# Patient Record
Sex: MALE | Race: WHITE
[De-identification: names, ages, dates, MRNs, and addresses within clinical notes are randomized per-mention and may not be internally consistent; named-entity substitution may affect disease eponyms.]

---

## 2019-03-30 ENCOUNTER — HOSPITAL ENCOUNTER (INPATIENT)
Dept: HOSPITAL 74 - FER | Age: 84
LOS: 17 days | Discharge: NURSING, REHAB OR LONG-TERM CARE | DRG: 252 | End: 2019-04-16
Attending: NURSE PRACTITIONER | Admitting: HOSPITALIST
Payer: COMMERCIAL

## 2019-03-30 VITALS — BODY MASS INDEX: 20.6 KG/M2

## 2019-03-30 DIAGNOSIS — R41.82: ICD-10-CM

## 2019-03-30 DIAGNOSIS — J96.01: ICD-10-CM

## 2019-03-30 DIAGNOSIS — J18.9: ICD-10-CM

## 2019-03-30 DIAGNOSIS — F03.90: ICD-10-CM

## 2019-03-30 DIAGNOSIS — I50.23: ICD-10-CM

## 2019-03-30 DIAGNOSIS — R60.0: ICD-10-CM

## 2019-03-30 DIAGNOSIS — I27.81: ICD-10-CM

## 2019-03-30 DIAGNOSIS — J96.02: ICD-10-CM

## 2019-03-30 DIAGNOSIS — J44.1: ICD-10-CM

## 2019-03-30 DIAGNOSIS — F32.9: ICD-10-CM

## 2019-03-30 DIAGNOSIS — I48.92: ICD-10-CM

## 2019-03-30 DIAGNOSIS — E87.2: ICD-10-CM

## 2019-03-30 DIAGNOSIS — N17.9: ICD-10-CM

## 2019-03-30 DIAGNOSIS — I27.20: ICD-10-CM

## 2019-03-30 DIAGNOSIS — J44.9: ICD-10-CM

## 2019-03-30 DIAGNOSIS — I48.2: ICD-10-CM

## 2019-03-30 DIAGNOSIS — Z86.718: ICD-10-CM

## 2019-03-30 DIAGNOSIS — N18.3: ICD-10-CM

## 2019-03-30 DIAGNOSIS — I13.0: ICD-10-CM

## 2019-03-30 DIAGNOSIS — I72.4: Primary | ICD-10-CM

## 2019-03-30 DIAGNOSIS — G47.33: ICD-10-CM

## 2019-03-30 DIAGNOSIS — E78.5: ICD-10-CM

## 2019-03-30 DIAGNOSIS — E03.9: ICD-10-CM

## 2019-03-30 DIAGNOSIS — Z99.81: ICD-10-CM

## 2019-03-30 LAB
ALBUMIN SERPL-MCNC: 3.2 G/DL (ref 3.4–5)
ALP SERPL-CCNC: 58 U/L (ref 45–117)
ALT SERPL-CCNC: 10 U/L (ref 13–61)
ANION GAP SERPL CALC-SCNC: 6 MMOL/L (ref 8–16)
ARTERIAL BLOOD GAS PCO2: 71.3 MMHG (ref 35–45)
AST SERPL-CCNC: 21 U/L (ref 15–37)
BASE EXCESS BLDA CALC-SCNC: 4.5 MEQ/L (ref -2–2)
BASOPHILS # BLD: 3.4 % (ref 0–2)
BILIRUB SERPL-MCNC: 1.3 MG/DL (ref 0.2–1)
BUN SERPL-MCNC: 30 MG/DL (ref 7–18)
CALCIUM SERPL-MCNC: 8 MG/DL (ref 8.5–10)
CHLORIDE SERPL-SCNC: 100 MMOL/L (ref 98–107)
CO2 SERPL-SCNC: 31 MMOL/L (ref 21–32)
COHGB MFR BLD: 1.9 % (ref 0–2)
CREAT SERPL-MCNC: 1.4 MG/DL (ref 0.55–1.3)
DEPRECATED RDW RBC AUTO: 16 % (ref 11.9–15.9)
EOSINOPHIL # BLD: 0.5 % (ref 0–4.5)
GLUCOSE SERPL-MCNC: 177 MG/DL (ref 74–106)
HCT VFR BLD CALC: 48.2 % (ref 35.4–49)
HGB BLD-MCNC: 15.2 GM/DL (ref 11.7–16.9)
INR BLD: 1.28 (ref 0.82–1.09)
LYMPHOCYTES # BLD: 8.3 % (ref 8–40)
MAGNESIUM SERPL-MCNC: 2 MG/DL (ref 1.8–2.4)
MCH RBC QN AUTO: 33.6 PG (ref 25.7–33.7)
MCHC RBC AUTO-ENTMCNC: 31.6 G/DL (ref 32–35.9)
MCV RBC: 106.3 FL (ref 80–96)
MONOCYTES # BLD AUTO: 7.9 % (ref 3.8–10.2)
NEUTROPHILS # BLD: 79.9 % (ref 42.8–82.8)
PLATELET # BLD AUTO: 119 K/MM3 (ref 134–434)
PMV BLD: 7.5 FL (ref 7.5–11.1)
PO2 BLDA: 35 MMHG (ref 80–105)
POTASSIUM SERPLBLD-SCNC: 5.5 MMOL/L (ref 3.5–5.1)
PROT SERPL-MCNC: 6.4 G/DL (ref 6.4–8.2)
PT PNL PPP: 14.3 SEC (ref 10.2–13)
RBC # BLD AUTO: 4.53 M/MM3 (ref 4–5.6)
SAO2 % BLDA: 57.7 % (ref 95–98)
SODIUM SERPL-SCNC: 137 MMOL/L (ref 136–145)
WBC # BLD AUTO: 6 K/MM3 (ref 4–10.8)

## 2019-03-30 RX ADMIN — ALBUTEROL SULFATE SCH AMP: 2.5 SOLUTION RESPIRATORY (INHALATION) at 13:59

## 2019-03-30 RX ADMIN — ALBUTEROL SULFATE SCH AMP: 2.5 SOLUTION RESPIRATORY (INHALATION) at 13:39

## 2019-03-30 NOTE — HP
Admitting History and Physical





- Primary Care Physician


PCP: Roosevelt Hernandez





- Admission


Chief Complaint: SOB, Leg Swelling


History of Present Illness: 





This is a 83 y/o man with a PMHx of: COPD 2LNC, Pulm HTN, HLD, Right Sided CHF, 

Atrial Flutter (on Eliquis) Hypothyroidism, Dementia, Depression, ANNA. Who 

presents to the ED with worsening SOB, productive cough- white phlegm x 1 week. 

Per ED records patient has been non-complaint with taking his Torsemide x 2 

days. Patient reports pain and swelling to his RLE. Patient reports quitting 

smoking 5 days ago. Patient denies fever, chills, dizziness, CP, palpitations, 

AP, N/V/D, constipation, dysuria 


History Source: Patient


Limitations to Obtaining History: Dementia, Poor Historian





- Past Medical History


CNS: Yes: Dementia


Cardiovascular: Yes: AFIB, Aneurysm (thoracic), HTN, Hyperlipdemia, Pulmonary 

Hypertension


Pulmonary: Yes: COPD, O2 Dependent, Sleep Apnea


Psych: Yes: Depression


Endocrine: Yes: Hypothyroidism





- Past Surgical History


Past Surgical History: Yes: Cataract Removal (bilateral)





- Smoking History


Smoking history: Current every day smoker


Have you smoked in the past 12 months: Yes


Aproximately how many cigarettes per day: 8 (hx 1 PPD x 50+ yrs)


If you are a former smoker, when did you quit?: 5 DAYS AGO





- Alcohol/Substance Use


Hx Alcohol Use: Yes


History of Substance Use: reports: None





- Social History


ADL: Independent


History of Recent Travel: No





Home Medications





- Allergies


Allergies/Adverse Reactions: 


 Allergies











Allergy/AdvReac Type Severity Reaction Status Date / Time


 


No Known Drug Allergies Allergy   Verified 03/30/19 12:19














- Home Medications


Home Medications: 


Ambulatory Orders





Ziprasidone [Geodon -] 80 mg PO HS 08/14/12 


Levalbuterol HCl [Xopenex] 1.25 mg IH Q4H PRN 12/02/15 


Potassium Chloride 20 meq PO DAILY  tablet 06/16/16 


Metoprolol Succinate 25 mg PO 1 and 1/2 DAILY 09/16/16 


Torsemide 20 mg PO DAILY  tablet 12/16/16 


Ascorbic Acid [Vitamin C] 500 mg PO DAILY  capsule 03/23/17 


Cholecalciferol (Vitamin D3) [Vitamin D3] 2,000 unit PO DAILY  capsule 03/23/17 


Carbamazepine 200 mg PO HS 03/30/19 











Family Disease History





- Family Disease History


Family History: Unable to Obtain





Review of Systems





- Review of Systems


Constitutional: reports: No Symptoms


Eyes: reports: No Symptoms


HENT: reports: No Symptoms


Neck: reports: No Symptoms


Cardiovascular: reports: Shortness of Breath


Respiratory: reports: Cough, SOB, SOB on Exertion


Gastrointestinal: reports: No Symptoms


Genitourinary: reports: No Symptoms


Breasts: reports: No Symptoms Reported


Musculoskeletal: reports: Extremity Pain, Joint Swelling


Integumentary: reports: No Symptoms


Neurological: reports: No Symptoms


Endocrine: reports: No Symptoms


Hematology/Lymphatic: reports: No Symptoms


Psychiatric: reports: No Symptoms





Physical Examination


Vital Signs: 


 Vital Signs











Temperature  98 F   03/30/19 18:41


 


Pulse Rate  62   03/30/19 19:23


 


Respiratory Rate  18   03/30/19 19:23


 


Blood Pressure  122/75   03/30/19 19:23


 


O2 Sat by Pulse Oximetry (%)  93 L  03/30/19 21:15











Constitutional: Yes: Mild Distress


Eyes: Yes: Conjunctiva Clear, EOM Intact, PERRL


HENT: Yes: WNL, Atraumatic, Normocephalic


Neck: Yes: WNL, Supple, Trachea Midline


Cardiovascular: Yes: Pulse Irregular, S1, S2


Respiratory: Yes: Diminished, On BiPap, Rhonchi, SOB, SOB on Exertion, Tachypnea

, Wheezes


Gastrointestinal: Yes: Normal Bowel Sounds, Soft


Edema: Yes


Edema: LLE: Trace, RLE: 2+


Peripheral Pulses WNL: Yes


Neurological: Yes: Alert, Confusion, Cran Nerves II-XII Intact


...Motor Strength: WNL


Psychiatric: Yes: Alert


Labs: 


 CBC, BMP





 03/30/19 13:20 





 03/30/19 13:20 











Imaging





- Results


Chest X-ray: Report Reviewed


Ultrasound: Report Reviewed, Image Reviewed


EKG: Image Reviewed





Problem List





- Problems


(1) Acute respiratory failure with hypoxia and hypercarbia


Code(s): J96.01 - ACUTE RESPIRATORY FAILURE WITH HYPOXIA; J96.02 - ACUTE 

RESPIRATORY FAILURE WITH HYPERCAPNIA   





(2) COPD with acute exacerbation


Code(s): J44.1 - CHRONIC OBSTRUCTIVE PULMONARY DISEASE W (ACUTE) EXACERBATION   





(3) CHF exacerbation


Code(s): I50.9 - HEART FAILURE, UNSPECIFIED   


Qualifiers: 


   Heart failure type: right-sided   Qualified Code(s): I50.813 - Acute on 

chronic right heart failure   





(4) Aneurysm of right popliteal artery


Code(s): I72.4 - ANEURYSM OF ARTERY OF LOWER EXTREMITY   





(5) Leg edema, right


Code(s): R60.0 - LOCALIZED EDEMA   





(6) Hyperlipidemia


Code(s): E78.5 - HYPERLIPIDEMIA, UNSPECIFIED   


Qualifiers: 


   Hyperlipidemia type: unspecified   Qualified Code(s): E78.5 - Hyperlipidemia

, unspecified   





(7) Hypertension


Code(s): I10 - ESSENTIAL (PRIMARY) HYPERTENSION   


Qualifiers: 


   Hypertension type: essential hypertension   Qualified Code(s): I10 - 

Essential (primary) hypertension   





(8) Pulmonary hypertension


Code(s): I27.2 - OTHER SECONDARY PULMONARY HYPERTENSION * DO NOT USE *   





(9) Obstructive sleep apnea


Code(s): G47.33 - OBSTRUCTIVE SLEEP APNEA (ADULT) (PEDIATRIC)   





(10) Hypothyroidism


Code(s): E03.9 - HYPOTHYROIDISM, UNSPECIFIED   


Qualifiers: 


   Hypothyroidism type: unspecified   Qualified Code(s): E03.9 - Hypothyroidism

, unspecified   





(11) Dementia


Code(s): F03.90 - UNSPECIFIED DEMENTIA WITHOUT BEHAVIORAL DISTURBANCE   


Qualifiers: 


   Dementia type: unspecified type   Dementia behavioral disturbance: without 

behavioral disturbance   Qualified Code(s): F03.90 - Unspecified dementia 

without behavioral disturbance   





(12) Depression


Code(s): F32.9 - MAJOR DEPRESSIVE DISORDER, SINGLE EPISODE, UNSPECIFIED   


Qualifiers: 


   Depression Type: unspecified   Qualified Code(s): F32.9 - Major depressive 

disorder, single episode, unspecified   





Assessment/Plan





This is a 83 y/o man with a PMHx of: COPD (2LNC), Pulm HTN, Dementia, HLD, R- 

sided CHF, Aflutter (on Eliquis), Hypothyroid, Depression, ANNA. Admitted to 

Telemetry for Acute Respiratory Failure with Hypoxia and Hypercapnia, Acute 

COPD Exacerbation, Pneumonia for further evaluation of their emergent condition.





Plan:





1. Pulm:


Acute Respiratory Failure with Hypoxia and Hypercapnia


Likely secondary to COPD flare vs CHF vs PNA


Continue cardiac monitoring


Chest Xray report- ?RLL infiltrate, increased bibasilar markings


[id number]


Ceftriaxone, and Doxycycline given in ED, will continue


ABG- 7.31/71/35/33/57


BIPAP settings: 40/16/12/5


Repeat ABG, titrate BIPAP accordingly


Appreciate Pulm Consult


Continue home meds


Monitor CBC, BMP





2. Cardiovascular:


HTN


CHF


Aflutter


R- Popliteal Artery Aneurysm


EKG- SR with PVCs, ST & T wave abnormality


Duplex LE- no DVT, +right popliteal artery aneurysm measuring 4.0cm x 2.3cm


Lasix, Solumederol, Magnesium Sulfate given in ED


Appreciate Cardiology consult


Appreciate Vascular consult


Neurovascular checks


Continue home meds


Continue Lasix, Solumederol with taper


Daily weights


Strict INOs





3. Endocrinology:


Hypothyroid


Continue Levothyroxine





4. Psych:


Dementia


Depression


Continue home meds





FEN


Fluid Restriction 1L


Replete lytes prn


Low Na Diet





DVT ppx


OOB


SCD- L- leg


Continue Eliquis





Dispo: Requires Inpatient Care





























Visit type





- Emergency Visit


Emergency Visit: Yes


ED Registration Date: 03/30/19


Care time: The patient presented to the Emergency Department on the above date 

and was hospitalized for further evaluation of their emergent condition.





- New Patient


This patient is new to me today: Yes


Date on this admission: 03/30/19





- Critical Care


Critical Care patient: No

## 2019-03-30 NOTE — PDOC
History of Present Illness





- General


Chief Complaint: Shortness of Breath


Stated Complaint: SOB


Time Seen by Provider: 03/30/19 12:35


History Source: Patient, Family


Exam Limitations: Dementia





- History of Present Illness


Initial Comments: 





03/30/19 14:09


Patient is a 84-year-old man with a history of COPD on 2L home O2, pulmonary HTN

, right sided CHF, HTN, hyperlipidemia, hypothyroidism, dementia, depression, 

atrial flutter, and ANNA, presenting with 1 week of progressively worsening 

productive white cough and shortness of breath with minimal exertion. He states 

he has not been taking his torsemide 20mg x 2 days because he forgot. since 

then, also RLE swelling and pain.


Notable for dementia, poor historian





Allergies: NKA


Past Medical History:  COPD on 2L home O2, pulmonary HTN, right sided CHF, HTN, 

hyperlipidemia, hypothyroidism, dementia, depression, atrial flutter, and ANNA


Social history: Lives with family. +current heavy smoker, No alcohol. No 

illicit drugs.


Surgical history: noncontributory


PMD: Dr Hernandez


Cards: Dr Shannon Peralta: Dr Mehta





Review of systems


Constitutional: no fevers or chills.


HEENT: no headache or dizziness. No congestion. No visual/hearing disturbances.


CVS:  no cp or syncope. No palpitations. +peripheral edema


Resp: +SOB, cough, wheezing.


Gastrointestinal: no abdominal pain, nausea or vomiting. 


Genitourinary: no urinary sx, hematuria.


MUSCULOSKELETAL: No joint pain and swelling. No neck or back pain. +leg 

swelling and pain.


SKIN: no redness or skin changes, no discharge, no rash. No wounds. +cyanosis


Hematologic: no easy bruising/bleeding.


NEUROLOGIC: No headache, dizziness, LOC or altered mental status. No weakness, 

numbness or tingling. 


Allergic/Immunologic: no allergies


All other systems reviewed and negative, or as documented in HPI. 





Physical exam:


General:   mild respiratory distress 2/2 tachypnea, increased WOB, speaking 

full short sentences.


HEENT:  NCAT, PERRL, EOMI, clear conjunctiva, anicteric, dry mucus membranes, 

clear oropharynx, no oral lesions.. 


Neck:  neck supple, FROM


Resp:  Moderate respiratory distress, tachypneic, on 2L supp O2 via NC, +

bilateral faint expiratory wheezing and diminished breath sounds/poor air 

movement with inspiration and expiration. +lip pursing, faint lip cyanosis on 

arrival


CVS: RRR, no murmurs, 2+ peripheral pulses throughout, +RLE peripheral edema, 1

+ PT pulses bilaterally


Abdomen: soft, NTND, no peritoneal signs. 


Back: nontender, normal inspection and ROM


MSK:  +RLE 4+ pitting edema in ankle and pretibial region. CAMP x4, ROM intact. 

No clubbing or cyanosis. normal bulk and tone.


Neuro: alert, no focal neuro deficits. wiggles toes, sensation grossly intact 

to light touch in all extremities including BLE


Skin: warm and well perfused, cap refill <2 sec








03/30/19 14:12





03/30/19 14:55





03/30/19 14:56





03/30/19 16:07





03/30/19 21:19








Past History





- Past Medical History


Allergies/Adverse Reactions: 


 Allergies











Allergy/AdvReac Type Severity Reaction Status Date / Time


 


No Known Drug Allergies Allergy   Verified 03/30/19 12:19











Home Medications: 


Ambulatory Orders





RX: Ziprasidone [Geodon -] 80 mg PO HS 08/14/12 


RX: Levalbuterol HCl [Xopenex] 1.25 mg IH Q4H PRN 12/02/15 


RX: Potassium Chloride 20 meq PO DAILY  tablet 06/16/16 


RX: Metoprolol Succinate 25 mg PO 1 and 1/2 DAILY 09/16/16 


RX: Torsemide 20 mg PO DAILY  tablet 12/16/16 


Ascorbic Acid [Vitamin C] 500 mg PO DAILY  capsule 03/23/17 


Cholecalciferol (Vitamin D3) [Vitamin D3] 2,000 unit PO DAILY  capsule 03/23/17 


RX: Carbamazepine 200 mg PO HS 03/30/19 








Anemia: No


Asthma: No


Cancer: No


Cardiac Disorders: Yes (THORACIC ANEURYSM BEING WATCHED)


CVA: No


COPD: Yes


CHF: No


Dementia: No


Diabetes: No


GI Disorders: No


 Disorders: Yes (BLADDER TUMOR)


HTN: Yes


Hypercholesterolemia: Yes


Liver Disease: No


Seizures: No


Thyroid Disease: Yes





- Surgical History


Abdominal Surgery: No


Appendectomy: No


Cardiac Surgery: No


Cholecystectomy: No


Lung Surgery: No


Neurologic Surgery: No


Orthopedic Surgery: Yes (REPAIR RIGHT ANKLE FX 2000)





- Suicide/Smoking/Psychosocial Hx


Smoking History: Current every day smoker


Have you smoked in the past 12 months: Yes


Number of Cigarettes Smoked Daily: 8


'Breaking Loose' booklet given: 02/29/16


Hx Alcohol Use: Yes


Drug/Substance Use Hx: No


Substance Use Type: Alcohol


Hx Substance Use Treatment: No





**Heart Score/ECG Review


  ** #1


ECG reviewed & interpreted by me at: 13:15


General ECG Interpretation: Sinus Rhythm, Normal Rate, Normal Intervals


Compared to previous ECG there are: No significant change





03/30/19 14:12





EKG normal sinus rhythm at 95 bpm, no interval abnormalities, narrow QRS, ST 

and T wave segments and morphology normal. Nonspecific T wave abnormalities 

similar to prior EKGs








ED Treatment Course





- LABORATORY


CBC & Chemistry Diagram: 


 03/30/19 13:20





 03/30/19 13:20





- RADIOLOGY


Radiology Studies Ordered: 














 Category Date Time Status


 


 CHEST X-RAY PORTABLE* [RAD] Stat Radiology  03/30/19 12:49 Ordered











Chest X-Ray Result: CHF





Medical Decision Making





- Critical Care Time


Total Critical Care Time (minutes): 60 (acute respiratory failure, COPD and CHF 

exacerbation)


Critical Care Statement: The care of this patient involved high complexity 

decision making to prevent further life threatening deterioration of the patient

's condition and/or to evaluate & treat vital organ system(s) failure or risk 

of failure.





- Medical Decision Making





03/30/19 14:09





See HPI for details





DDx SOB: ACS, PE, PTX, CHF, COPD exac, pulmonary edema, pleurisy, pneumonia, 

viral syndrome. effusion. anemia, electrolyte/metabolic derangements.





Vital signs reviewed, +borderline hypoxia with COPD at baseline, on 2L NC O2 

supplementally, 93% appropriate. mild tachypnea, normotensive. afebrile,


Prior notes reviewed, including admissions, discharges and consultations. 

laboratory results and imaging reviewed, basic labs and lytes wnl, notable for 

baseline Cr 1.4, mild K elevation to 5.5, w/o EKG abnormalities such as 

interval derangements, QRS widening, T wave tenting.


ABG_acute respiratory acidosis/hypercarbic and hypoxemic.


CXR_cardiomegaly, increased bibasilar interstitial markings, no effusion or 

focal infiltrate.


Cardiac panel_neg trop, reassuring, less likely cardiac


bnp_elevated, similar to prior results as a result of his known CHF.


EKG normal sinus rhythm at 95 bpm, no interval abnormalities, narrow QRS, ST 

and T wave segments and morphology normal. Nonspecific T wave abnormalities 

similar to prior EKGs





ED course


- duonebs x3, IV solumedrol, Mg given severe COPD exacerbation


- diuresis with lasix, missed several days of torsemide, so also likely CHF 

flare.


-remains hypoxic here, allowed for 88-92% given COPD history. did have to 

uptitrate his O2 to maintain sats >88%. wheezing improved, still working to 

breathe and purse lips on expiration; uptitrated his O2 to 4L O2 via nasal 

cannula, now sats on multiple reassessments >90% which is appropriate in 

setting of COPD. however continues to have WOB, ABG with acute respiratory 

hypercarbic/hypoxemic acidosis, put on bipap, arranging with respiratory to 

[place]. comfortable on bipap, maintain to blow off Co2, recheck blood gas after 

several hours. bipap initiated ~7pm





- abx for coverage, as he is high risk with his comorbities and respiratory 

complications. cef/doxy appropriate coverage for CAP.


- blood cultures pending.


- flu swab negative


- duplex neg for DVT, however, +rt popliteal artery aneurysm with thrombus. NVI

, with 1+ PT pulses bilaterally, no pain, only 4+ peripheral edema in RLE. not 

acute ischemic limb, likely chronic in nature 


- Vascular cs, spoke with Dr Taylor on call, discussed the case and will need 

medical optimization prior to operative management. 


- ICU cs, accepted for higher level of care given his acute hypoxic respiratory 

failure requiring closer monitoring. 


- cards cs with Dr Myrick, for his right CHF exacerbation, diuresed and close 

monitoring of I/Os, urinal at bedside.





admit ICU [facility] for acute respiratory failure 2/2 COPD/CHF exacerbation 

at time of admission order. 


incidentally found to have rt popliteal artery aneurysm, with thrombus.


s/o to hospitalist team as well, admitting under Dr Trevizo. had discussed care 

several times via phone regarding status and bed placement.





440pm - no ICU bed, changed to tele. on reexam, pt has improved oxygenation 

status. remains on supp O2 via NC at 4L, stable. updated to telemetry bed at 

[facility]








03/30/19 17:31





03/30/19 19:53





03/30/19 21:21





03/30/19 21:21








*DC/Admit/Observation/Transfer


Diagnosis at time of Disposition: 


 COPD with acute exacerbation, Leg edema, right, Aneurysm of right popliteal 

artery, Right ventricular systolic dysfunction, Acute respiratory failure with 

hypoxia and hypercarbia





CHF exacerbation


Qualifiers:


 Heart failure type: right-sided Qualified Code(s): I50.813 - Acute on chronic 

right heart failure








- Discharge Dispostion


Condition at time of disposition: Guarded


Decision to Admit order: Yes


Decision to Admit order Date/Time: 





03/30/19 15:02





Decision to Admit Order











 Category Date Time Status


 


 Decision to Admit to Hospital Routine Admission  03/30/19 15:01 Ordered

















- Referrals





- Patient Instructions





- Post Discharge Activity

## 2019-03-31 LAB
ANION GAP SERPL CALC-SCNC: 4 MMOL/L (ref 8–16)
ARTERIAL BLOOD GAS PCO2: 55.7 MMHG (ref 35–45)
ARTERIAL BLOOD GAS PCO2: 56.8 MMHG (ref 35–45)
ARTERIAL PATENCY WRIST A: POSITIVE
ARTERIAL PATENCY WRIST A: POSITIVE
BASE EXCESS BLDA CALC-SCNC: 5.4 MEQ/L (ref -2–2)
BASE EXCESS BLDA CALC-SCNC: 5.6 MEQ/L (ref -2–2)
BASOPHILS # BLD: 0.2 % (ref 0–2)
BUN SERPL-MCNC: 26 MG/DL (ref 7–18)
CALCIUM SERPL-MCNC: 7.8 MG/DL (ref 8.5–10.1)
CHLORIDE SERPL-SCNC: 102 MMOL/L (ref 98–107)
CHOLEST SERPL-MCNC: 112 MG/DL (ref 50–200)
CO2 SERPL-SCNC: 31 MMOL/L (ref 21–32)
CREAT SERPL-MCNC: 1.3 MG/DL (ref 0.55–1.3)
DEPRECATED RDW RBC AUTO: 17 % (ref 11.9–15.9)
EOSINOPHIL # BLD: 0.2 % (ref 0–4.5)
GLUCOSE SERPL-MCNC: 78 MG/DL (ref 74–106)
HCT VFR BLD CALC: 45.1 % (ref 35.4–49)
HDLC SERPL-MCNC: 53 MG/DL (ref 40–60)
HGB BLD-MCNC: 14.4 GM/DL (ref 11.7–16.9)
LYMPHOCYTES # BLD: 10.5 % (ref 8–40)
MCH RBC QN AUTO: 33.5 PG (ref 25.7–33.7)
MCHC RBC AUTO-ENTMCNC: 32 G/DL (ref 32–35.9)
MCV RBC: 104.8 FL (ref 80–96)
MONOCYTES # BLD AUTO: 7.8 % (ref 3.8–10.2)
NEUTROPHILS # BLD: 81.3 % (ref 42.8–82.8)
PLATELET # BLD AUTO: 94 K/MM3 (ref 134–434)
PMV BLD: 7.8 FL (ref 7.5–11.1)
PO2 BLDA: 65.9 MMHG (ref 80–105)
PO2 BLDA: 77.4 MMHG (ref 80–105)
POTASSIUM SERPLBLD-SCNC: 4.5 MMOL/L (ref 3.5–5.1)
RBC # BLD AUTO: 4.31 M/MM3 (ref 4–5.6)
SAO2 % BLDA: 92.4 % (ref 95–98)
SAO2 % BLDA: 94.9 % (ref 95–98)
SODIUM SERPL-SCNC: 136 MMOL/L (ref 136–145)
TRIGL SERPL-MCNC: 55 MG/DL (ref 0–150)
WBC # BLD AUTO: 4.1 K/MM3 (ref 4–10)

## 2019-03-31 RX ADMIN — OMEGA-3-ACID ETHYL ESTERS SCH GM: 1 CAPSULE, LIQUID FILLED ORAL at 21:09

## 2019-03-31 RX ADMIN — OMEGA-3-ACID ETHYL ESTERS SCH GM: 1 CAPSULE, LIQUID FILLED ORAL at 11:01

## 2019-03-31 RX ADMIN — DIGOXIN SCH MG: 125 TABLET ORAL at 11:00

## 2019-03-31 RX ADMIN — ATORVASTATIN CALCIUM SCH MG: 10 TABLET, FILM COATED ORAL at 21:08

## 2019-03-31 RX ADMIN — DOXYCYCLINE SCH MLS/HR: 100 INJECTION, POWDER, LYOPHILIZED, FOR SOLUTION INTRAVENOUS at 11:14

## 2019-03-31 RX ADMIN — APIXABAN SCH MG: 5 TABLET, FILM COATED ORAL at 10:59

## 2019-03-31 RX ADMIN — APIXABAN SCH MG: 5 TABLET, FILM COATED ORAL at 21:09

## 2019-03-31 RX ADMIN — FUROSEMIDE SCH MG: 10 INJECTION, SOLUTION INTRAVENOUS at 14:50

## 2019-03-31 RX ADMIN — LEVOTHYROXINE SODIUM SCH MCG: 50 TABLET ORAL at 06:04

## 2019-03-31 RX ADMIN — IPRATROPIUM BROMIDE AND ALBUTEROL SULFATE SCH AMP: .5; 3 SOLUTION RESPIRATORY (INHALATION) at 20:22

## 2019-03-31 RX ADMIN — DOXYCYCLINE SCH MLS/HR: 100 INJECTION, POWDER, LYOPHILIZED, FOR SOLUTION INTRAVENOUS at 21:13

## 2019-03-31 RX ADMIN — IPRATROPIUM BROMIDE AND ALBUTEROL SULFATE SCH: .5; 3 SOLUTION RESPIRATORY (INHALATION) at 17:36

## 2019-03-31 RX ADMIN — DONEPEZIL HYDROCHLORIDE SCH MG: 10 TABLET, FILM COATED ORAL at 21:09

## 2019-03-31 RX ADMIN — CEFTRIAXONE SCH MLS/HR: 1 INJECTION, POWDER, FOR SOLUTION INTRAMUSCULAR; INTRAVENOUS at 16:24

## 2019-03-31 RX ADMIN — OXYCODONE HYDROCHLORIDE AND ACETAMINOPHEN SCH MG: 500 TABLET ORAL at 11:03

## 2019-03-31 NOTE — PN
Progress Note (short form)





- Note


Progress Note: 





Vascular Surgery 





Vascular Study reviewed.


Right popliteal artery with 2.3cm aneurysm. 


Reccommend CTA of Aorta and bl lower extremities runoff. 


With a popliteal artery aneurysm there is a 20-30 percent chance that 


the pt might have a abdominal aortic aneurysm. 


PLease order CTA when pt is stable. 








Justus Taylor DO

## 2019-03-31 NOTE — CON.PULM
Consult


Consult Specialty:: PULM/CCM 


Referred by:: ROSALINA


Reason for Consultation:: SOB





- History of Present Illness


Chief Complaint: SOB


History of Present Illness: 


84 M, home O2 dependent COPD, Pulmonary HTN, HLD, Diastolic CHF, Atrial Flutter 

on Eliquis, Hypothyroidism, Dementia, Depression, and Moderate ANNA (RDI: 16.3) 

titrated to CPAP @ 12 cm H2O (not using). 





Admitted via the ER due to progressive SOB and cough with white phlegm x 1 week.





He was noted to be in acute on chronic hypercpaneic respiratory failure 

requiring NIPPV support. 





No travel history or sick contacts. 





No hemoptysis or night sweats. 





Apparently he has been noncompliant with his Torsemide. 





CT imaging 4/18: 2.7 x 1.6 cm mass like density in the lingula / extensive 

emphysema 





- History Source


History Provided By: Patient


Limitations to Obtaining History: Poor Historian





- Past Medical History


CNS: Yes: Dementia


Cardio/Vascular: Yes: AFIB, Aneurysm (thoracic), HTN, Hyperlipdemia, Pulmonary 

Hypertension


Pulmonary: Yes: Bronchitis, COPD, O2 Dependent, Pneumonia, Sleep Apnea.  No: 

Previously Intubated, Pulmonary Embolus, Pulmonary Fibrosis


Psych: Yes: Depression


Endocrine: Yes: Hypothyroidism





- Past Surgical History


Past Surgical History: Yes: Cataract Removal (bilateral)





- Alcohol/Substance Use


Hx Alcohol Use: Yes


History of Substance Use: reports: None





- Smoking History


Smoking history: Current every day smoker


Have you smoked in the past 12 months: Yes


Aproximately how many cigarettes per day: 8 (hx 1 PPD x 50+ yrs)


If you are a former smoker, when did you quit?: 5 DAYS AGO





- Social History


ADL: Independent


History of Recent Travel: No





Home Medications





- Allergies


Allergies/Adverse Reactions: 


 Allergies











Allergy/AdvReac Type Severity Reaction Status Date / Time


 


No Known Drug Allergies Allergy   Verified 03/30/19 12:19














- Home Medications


Home Medications: 


Ambulatory Orders





Ziprasidone [Geodon -] 80 mg PO HS 08/14/12 


Levalbuterol HCl [Xopenex] 1.25 mg IH Q4H PRN 12/02/15 


Potassium Chloride 20 meq PO DAILY  tablet 06/16/16 


Metoprolol Succinate 25 mg PO 1 and 1/2 DAILY 09/16/16 


Torsemide 20 mg PO DAILY  tablet 12/16/16 


Ascorbic Acid [Vitamin C] 500 mg PO DAILY  capsule 03/23/17 


Cholecalciferol (Vitamin D3) [Vitamin D3] 2,000 unit PO DAILY  capsule 03/23/17 


Carbamazepine 200 mg PO HS 03/30/19 











Review of Systems





- Review of Systems


Constitutional: reports: Malaise, Weakness.  denies: Chills, Diaphoresis, 

Lethargy, Night Sweats, Unintentional Wgt. Loss


Eyes: reports: No Symptoms


HENT: reports: No Symptoms


Neck: reports: No Symptoms


Cardiovascular: reports: Edema, Shortness of Breath.  denies: Chest Pain, 

Palpitations


Respiratory: reports: Cough, Snoring, SOB, SOB on Exertion, Wheezing.  denies: 

Hemoptysis


Gastrointestinal: reports: No Symptoms


Genitourinary: reports: No Symptoms


Breasts: reports: No Symptoms Reported


Musculoskeletal: reports: No Symptoms


Integumentary: reports: No Symptoms


Neurological: reports: No Symptoms


Endocrine: reports: No Symptoms


Hematology/Lymphatic: reports: No Symptoms


Psychiatric: reports: No Symptoms





Physical Exam


Vital Sings: 


 Vital Signs











Temperature  97.4 F L  03/31/19 06:00


 


Pulse Rate  69   03/31/19 11:00


 


Respiratory Rate  20   03/31/19 06:00


 


Blood Pressure  138/80   03/31/19 06:00


 


O2 Sat by Pulse Oximetry (%)  93 L  03/30/19 21:15











Constitutional: Yes: Mild Distress


Eyes: Yes: Conjunctiva Clear, EOM Intact


HENT: Yes: Atraumatic, Normocephalic


Neck: Yes: Supple, Trachea Midline


Cardiovascular: Yes: Pulse Irregular


Respiratory: Yes: Cough, Diminished, Rhonchi, SOB, SOB on Exertion, Tachypnea, 

Wheezes.  No: Accessory Muscle Use, Stridor


...Inspection: Yes: WNL


...Clubbing: No


Gastrointestinal: Yes: Normal Bowel Sounds, Soft


Renal/: Yes: WNL


Musculoskeletal: Yes: WNL


Extremities: Yes: WNL


Edema: Yes


Peripheral Pulses WNL: Yes


Integumentary: Yes: WNL


Neurological: Yes: WNL, Alert, Oriented


...Motor Strength: WNL


Psychiatric: Yes: WNL, Alert, Oriented


Labs: 


 CBC, BMP





 03/31/19 07:00 





 03/31/19 07:00 





 ABG Results











ABG pH  7.37  (7.35-7.45)   03/31/19  07:00    


 


ABG pCO2 at Pt Temp  56.8 mmHg (35-45)  H  03/31/19  07:00    


 


ABG pO2 at Pt Temp  65.9 mmHg ()  L  03/31/19  07:00    


 


ABG HCO3  32.2 mmol/L (22-27)  H  03/31/19  07:00    


 


ABG O2 Sat (Measured)  92.4 % (95-98)  L  03/31/19  07:00    


 


ABG O2 Content  18.3 % vol (15-22)   03/31/19  07:00    


 


ABG Base Excess  5.6 meq/l (-2-2)  H  03/31/19  07:00    














Imaging





- Results


Chest X-ray: Report Reviewed, Image Reviewed





Problem List





- Problems


(1) Community acquired pneumonia


Code(s): J18.9 - PNEUMONIA, UNSPECIFIED ORGANISM   





(2) Acute respiratory acidosis


Code(s): E87.2 - ACIDOSIS   





(3) Acute respiratory failure with hypoxia and hypercarbia


Code(s): J96.01 - ACUTE RESPIRATORY FAILURE WITH HYPOXIA; J96.02 - ACUTE 

RESPIRATORY FAILURE WITH HYPERCAPNIA   





(4) Aneurysm of right popliteal artery


Code(s): I72.4 - ANEURYSM OF ARTERY OF LOWER EXTREMITY   





(5) COPD with acute exacerbation


Code(s): J44.1 - CHRONIC OBSTRUCTIVE PULMONARY DISEASE W (ACUTE) EXACERBATION   





(6) Chronic respiratory acidosis


Code(s): E87.2 - ACIDOSIS   





(7) Leg edema, right


Code(s): R60.0 - LOCALIZED EDEMA   





(8) Right ventricular systolic dysfunction


Code(s): I51.9 - HEART DISEASE, UNSPECIFIED   





(9) Atrial flutter


Code(s): I48.92 - UNSPECIFIED ATRIAL FLUTTER   


Qualifiers: 


   Atrial flutter type: unspecified   Qualified Code(s): I48.92 - Unspecified 

atrial flutter   





(10) COPD (chronic obstructive pulmonary disease)


Code(s): J44.9 - CHRONIC OBSTRUCTIVE PULMONARY DISEASE, UNSPECIFIED   


Qualifiers: 


   COPD type: COPD with acute exacerbation   Qualified Code(s): J44.1 - Chronic 

obstructive pulmonary disease with (acute) exacerbation   





(11) Chronic cor pulmonale


Code(s): I27.81 - COR PULMONALE (CHRONIC)   





(12) Dementia


Code(s): F03.90 - UNSPECIFIED DEMENTIA WITHOUT BEHAVIORAL DISTURBANCE   


Qualifiers: 


   Dementia type: unspecified type   Dementia behavioral disturbance: without 

behavioral disturbance   Qualified Code(s): F03.90 - Unspecified dementia 

without behavioral disturbance   





(13) Hyperlipidemia


Code(s): E78.5 - HYPERLIPIDEMIA, UNSPECIFIED   


Qualifiers: 


   Hyperlipidemia type: unspecified   Qualified Code(s): E78.5 - Hyperlipidemia

, unspecified   





(14) Hypertension


Code(s): I10 - ESSENTIAL (PRIMARY) HYPERTENSION   


Qualifiers: 


   Hypertension type: essential hypertension   Qualified Code(s): I10 - 

Essential (primary) hypertension   





(15) Hypothyroidism


Code(s): E03.9 - HYPOTHYROIDISM, UNSPECIFIED   


Qualifiers: 


   Hypothyroidism type: unspecified   Qualified Code(s): E03.9 - Hypothyroidism

, unspecified   





(16) Obstructive sleep apnea


Code(s): G47.33 - OBSTRUCTIVE SLEEP APNEA (ADULT) (PEDIATRIC)   





(17) Pulmonary hypertension


Code(s): I27.2 - OTHER SECONDARY PULMONARY HYPERTENSION * DO NOT USE *   





Assessment/Plan


Agree with ABX coverage 


Check urine antigen 


Check sputum 


NC O2 as tolerated 


NIPPV QHS and PRN 


Repeat CT chest for follow up of mass 


No smoking discussed


AC 


BD TX standing and PRN 


Continuous oxymetry monitoring


Will follow





Thank you. 





Dr Flor

## 2019-03-31 NOTE — CON.CARD
Cardiology Consult (text)





- Consultation


Consultation Note: 








Consultation Note: 


CC: SOB, LE edema





HPI: 83 yo M with h/o severe pHTN likely 2/2 untx ANNA and COPD on home O2, CAD 

based on coronary calcifications on chest CT, stable aortic dilation of the 

ascending and abdominal aorta, HTN/HL, remote history of DVT, afib on eliquis p/

w SOB, cough.  Cough for 1 week, also dyspnea. Has been noncompliant with 

torsemide for the last two days, pain and swelling of RLE noted.  Hx smoking, 

says he quit 5 days ago.  On nebs, steroids here, received IV lasix.  Has had 

some abd distension which he had in the past with volume overload.  Sees Dr. Ortega for cardio.











 Allergies











Allergy/AdvReac Type Severity Reaction Status Date / Time


 


No Known Drug Allergies Allergy   Verified 03/30/19 12:19











PMHx: Per HPI. Hypothyroid. 








PSH: Cataract





Social: Current smoker, rare Alcohol use. 





Family: No family history of heart disease. 





ROS: Per HPI


Ambulatory Orders





Ziprasidone [Geodon -] 80 mg PO HS 08/14/12 


Levalbuterol HCl [Xopenex] 1.25 mg IH Q4H PRN 12/02/15 


Potassium Chloride 20 meq PO DAILY  tablet 06/16/16 


Metoprolol Succinate 25 mg PO 1 and 1/2 DAILY 09/16/16 


Torsemide 20 mg PO DAILY  tablet 12/16/16 


Ascorbic Acid [Vitamin C] 500 mg PO DAILY  capsule 03/23/17 


Cholecalciferol (Vitamin D3) [Vitamin D3] 2,000 unit PO DAILY  capsule 03/23/17 


Carbamazepine 200 mg PO HS 03/30/19 








PE: 


  Vital Signs











 Period  Temp  Pulse  Resp  BP Sys/Gonsales  Pulse Ox


 


 Last 24 Hr  97.2 F-98 F  53-93  18-22  108-155/63-87  86-96











NAD, JVD elevated to mandible, -hepatojugular reflux


Irregular rate and rhythm. Nl S1, S2. 2/6 murmur at LSB


Good respiratory effort.  Prolonged exp phase with exp wheezes bilaterally


aaox 3


RLE 3+ pitting edema to knee


+BS, soft, NT, distended. 


Pos dp/PT


No jaundice, diaphoresis. 





 Laboratory Last Values











WBC  4.1 K/mm3 (4.0-10.0)   03/31/19  07:00    


 


RBC  4.31 M/mm3 (4.00-5.60)   03/31/19  07:00    


 


Hgb  14.4 GM/dL (11.7-16.9)   03/31/19  07:00    


 


Hct  45.1 % (35.4-49)   03/31/19  07:00    


 


MCV  104.8 fl (80-96)  H  03/31/19  07:00    


 


MCH  33.5 pg (25.7-33.7)   03/31/19  07:00    


 


MCHC  32.0 g/dl (32.0-35.9)   03/31/19  07:00    


 


RDW  17.0 % (11.9-15.9)  H  03/31/19  07:00    


 


Plt Count  94 K/MM3 (134-434)  L D 03/31/19  07:00    


 


MPV  7.8 fl (7.5-11.1)  D 03/31/19  07:00    


 


Absolute Neuts (auto)  3.3 K/mm3 (1.5-8.0)   03/31/19  07:00    


 


Neutrophils %  81.3 % (42.8-82.8)   03/31/19  07:00    


 


Lymphocytes %  10.5 % (8-40)  D 03/31/19  07:00    


 


Monocytes %  7.8 % (3.8-10.2)  D 03/31/19  07:00    


 


Eosinophils %  0.2 % (0-4.5)  D 03/31/19  07:00    


 


Basophils %  0.2 % (0-2.0)   03/31/19  07:00    


 


Nucleated RBC %  0 % (0-0)   03/31/19  07:00    


 


PT with INR  14.3 SEC (10.2-13.0)  H  03/30/19  13:20    


 


INR  1.28  (0.82-1.09)  H D 03/30/19  13:20    


 


Anticoagulation Therapy  No Result Required.   03/31/19  07:00    


 


Puncture Site  Right brachial   03/31/19  07:00    


 


ABG pH  7.37  (7.35-7.45)   03/31/19  07:00    


 


ABG pCO2 at Pt Temp  56.8 mmHg (35-45)  H  03/31/19  07:00    


 


ABG pO2 at Pt Temp  65.9 mmHg ()  L  03/31/19  07:00    


 


ABG HCO3  32.2 mmol/L (22-27)  H  03/31/19  07:00    


 


ABG O2 Sat (Measured)  92.4 % (95-98)  L  03/31/19  07:00    


 


ABG O2 Content  18.3 % vol (15-22)   03/31/19  07:00    


 


ABG Base Excess  5.6 meq/l (-2-2)  H  03/31/19  07:00    


 


Hector Test  Positive   03/31/19  07:00    


 


Carboxyhemoglobin  1.9 % (0-2)   03/30/19  14:48    


 


Methemoglobin  0.2 % (0-2)   03/30/19  14:48    


 


O2 Delivery Device  Nasal   03/31/19  07:00    


 


Oxygen Flow Rate  3l   03/31/19  07:00    


 


Vent Mode  No Result Required.   03/31/19  07:00    


 


Vent Rate  No Result Required.   03/31/19  07:00    


 


Mechanical Rate  No Result Required.   03/31/19  07:00    


 


PEEP  0.0 cmH2O  03/31/19  00:10    


 


Pressure Support Vent  No Result Required.   03/31/19  07:00    


 


Sodium  136 mmol/L (136-145)   03/31/19  07:00    


 


Potassium  4.5 mmol/L (3.5-5.1)   03/31/19  07:00    


 


Chloride  102 mmol/L ()   03/31/19  07:00    


 


Carbon Dioxide  31 mmol/L (21-32)   03/31/19  07:00    


 


Anion Gap  4 MMOL/L (8-16)  L  03/31/19  07:00    


 


BUN  26 mg/dL (7-18)  H  03/31/19  07:00    


 


Creatinine  1.3 mg/dL (0.55-1.3)   03/31/19  07:00    


 


Creat Clearance w eGFR  52.72  (>60)   03/31/19  07:00    


 


Random Glucose  78 mg/dL ()   03/31/19  07:00    


 


Hemoglobin A1c %  5.8 % (4.2-6.3)   03/31/19  07:00    


 


Calcium  7.8 mg/dL (8.5-10.1)  L  03/31/19  07:00    


 


Magnesium  2.0 mg/dL (1.8-2.4)   03/30/19  13:20    


 


Total Bilirubin  1.3 mg/dl (0.2-1)  H  03/30/19  13:20    


 


AST  21 U/L (15-37)   03/30/19  13:20    


 


ALT  10 U/L (13-61)  L  03/30/19  13:20    


 


Alkaline Phosphatase  58 U/L ()   03/30/19  13:20    


 


Troponin I  0.03 ng/ml (0.00-0.05)   03/31/19  07:00    


 


B-Natriuretic Peptide  95220.0 pg/ml (5-450)  H  03/30/19  13:20    


 


Total Protein  6.4 g/dl (6.4-8.2)   03/30/19  13:20    


 


Albumin  3.2 g/dl (3.4-5.0)  L  03/30/19  13:20    


 


Triglycerides  55 mg/dL (0-150)   03/31/19  07:00    


 


Cholesterol  112 mg/dL ()   03/31/19  07:00    


 


Total LDL Cholesterol  57 mg/dL (5-100)   03/31/19  07:00    


 


HDL Cholesterol  53 mg/dL (40-60)   03/31/19  07:00    


 


Urine Color  Arin   03/30/19  14:18    


 


Urine Appearance  Clear   03/30/19  14:18    


 


Urine pH  6.0  (4.5-8)   03/30/19  14:18    


 


Urine Protein  2+  (NEGATIVE)  H  03/30/19  14:18    


 


Urine Glucose (UA)  Negative  (NEGATIVE)   03/30/19  14:18    


 


Urine Ketones  Negative  (NEGATIVE)   03/30/19  14:18    


 


Urine Blood  Negative  (NEGATIVE)   03/30/19  14:18    


 


Urine Nitrite  Negative  (NEGATIVE)   03/30/19  14:18    


 


Urine Bilirubin  Negative  (NEGATIVE)   03/30/19  14:18    


 


Urine Urobilinogen  2.0  (0.2-1.0)   03/30/19  14:18    


 


Ur Leukocyte Esterase  Negative  (NEGATIVE)   03/30/19  14:18    


 


Urine WBC  0-2  (NEGATIVE)   03/30/19  14:18    


 


Digoxin  0.43 ng/ml (0.8-2.0)  L  03/31/19  07:00    


 


Influenza A (Rapid)  Negative   03/30/19  15:00    


 


Influenza B (Rapid)  Negative   03/30/19  15:00    











Echo 2/18/16: Nl LV. RV mild-mod dilation with mild-mod systolic dysfunction.  

Severe pHTN (RVSP 62), mild TR. Mild Ao dilation.  





Pharm nuc stress 2/18/16: Afib with RVR. Asx. No ischemic changes. Nl perf. Nl 

EF. 





CXR: no congestion





CT chest: extensive COPD changes





EKG: sinus, PACs





RLE ultrasound, no DVT, R popliteal artery aneurysm





tele: sinus











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 

on coronary calcifications on chest CT, stable aortic dilation of the ascending 

and abdominal aorta, HTN/HL, remote history of DVT, afib on eliquis p/w SOB, 

cough.





SOB, cough, COPD


- CT with significant findings for COPD, receiving nebs and steroids per primary


- also received IV lasix for CHF, missed torsemide doses





hx R heart failure, pulm HTN


- BNP >11,000 


- echo pending


- received lasix 40 mg IV x 1, now on 80 mg IV BID - has abd distension, which 

he has had in the past with volume overload


- cont IV lasix, monitor Cr,lytes, daily standing weights





Atrial fibrillation. 


- cont eliquis, digoxin





popliteal artery aneurysm


- vascular consulted, Dr. Taylor


 


HL: 


- cont statin

## 2019-03-31 NOTE — PN
Progress Note, Physician





- Current Medication List


Current Medications: 


Active Medications





Albuterol Sulfate (Ventolin 0.083% Nebulizer Soln -)  1 amp NEB Q6H PRN


   PRN Reason: SHORT OF BREATH/WHEEZING


Apixaban (Eliquis -)  5 mg PO BID Replaced by [facility]


Ascorbic Acid (Vitamin C -)  500 mg PO DAILY Replaced by [facility]


Atorvastatin Calcium (Lipitor -)  10 mg PO HS [facility]


Carbamazepine (Tegretol -)  200 mg PO HS [facility]


Digoxin (Lanoxin -)  0.125 mg PO DAILY [facility]


Donepezil HCl (Aricept -)  10 mg PO HS G[facility]


Furosemide (Lasix Injection -)  40 mg IVPUSH DAILY Replaced by [facility]


Doxycycline Hyclate 100 mg/ (Dextrose)  100 mls @ 50 mls/hr IVPB BID GARY


Ceftriaxone Sodium 0.5 gm/ (Dextrose)  50 mls @ 100 mls/hr IVPB DAILY Replaced by [facility]; 

Protocol


Levothyroxine Sodium (Synthroid -)  50 mcg PO DAILY@0700 Replaced by [facility]


   Last Admin: 03/31/19 06:04 Dose:  50 mcg


Methylprednisolone Sodium Succinate (Solu-Medrol -)  40 mg IVPUSH Q8H-IV GARY


Non-Formulary Medication (Memantine Hcl [Namenda Xr])  14 mg PO AM GARY


Omega-3-Acid Ethyl Esters (Lovaza -)  1 gm PO BID Replaced by [facility]











- Objective


Vital Signs: 


 Vital Signs











Temperature  97.4 F L  03/31/19 06:00


 


Pulse Rate  53 L  03/31/19 06:00


 


Respiratory Rate  20   03/31/19 06:00


 


Blood Pressure  138/80   03/31/19 06:00


 


O2 Sat by Pulse Oximetry (%)  93 L  03/30/19 21:15











Constitutional: Yes: Well Nourished, No Distress, Calm


Eyes: Yes: WNL, Conjunctiva Clear, EOM Intact


HENT: Yes: WNL, Atraumatic, Normocephalic


Neck: Yes: WNL, Supple, Trachea Midline


Cardiovascular: Yes: WNL, Pulse Irregular, S1, S2


Respiratory: Yes: WNL, Regular, On BiPap, Rales, Rhonchi, SOB, SOB on Exertion


Gastrointestinal: Yes: WNL, Normal Bowel Sounds, Soft


Musculoskeletal: Yes: WNL


Extremities: Yes: WNL


Edema: Yes


Edema: LLE: 3+, RLE: 2+


Peripheral Pulses WNL: Yes


Integumentary: Yes: WNL


Neurological: Yes: WNL, Alert, Oriented


...Motor Strength: WNL


Psychiatric: Yes: WNL, Alert, Oriented


Labs: 


 CBC, BMP





 03/31/19 07:00 





 03/31/19 07:00 





 INR, PTT











INR  1.28  (0.82-1.09)  H D 03/30/19  13:20    














Problem List





- Problems


(1) Acute respiratory failure with hypoxia and hypercarbia


Assessment/Plan: 


2/2 tanmay COPD exacerbation 





patient is responding to BiPAP his CO2 level has decreased 


c/w BiPAP


trend the ABG 


pulmonary evaluation 





Code(s): J96.01 - ACUTE RESPIRATORY FAILURE WITH HYPOXIA; J96.02 - ACUTE 

RESPIRATORY FAILURE WITH HYPERCAPNIA   





(2) Aneurysm of right popliteal artery


Assessment/Plan: 


seen on US of the lower ext 


vascular surgery is following up on the patient 


Code(s): I72.4 - ANEURYSM OF ARTERY OF LOWER EXTREMITY   





(3) CHF exacerbation


Assessment/Plan: 


HFrEF 


obtain echocardiogram 


cardiology consultation 


admit to tele monitor 


IV 80mg furosemide twice a day 


then switch to 40mg IV x twice day from tmorrow 





Code(s): I50.9 - HEART FAILURE, UNSPECIFIED   


Qualifiers: 


   Heart failure type: right-sided   Qualified Code(s): I50.813 - Acute on 

chronic right heart failure   





(4) COPD with acute exacerbation


Assessment/Plan: 


c/w nebulizing treatment 


PO prednisone 40mg daily 


pulmonary evaluation 





Code(s): J44.1 - CHRONIC OBSTRUCTIVE PULMONARY DISEASE W (ACUTE) EXACERBATION   





(5) Atrial flutter


Assessment/Plan: 


possible 2/2 to the pulmonary HTN and COPD 


stable c/w digoxin 


c/w tele monitor 


Cardiology evaluation 


c/w apixiban 5mg twice a day - based on weight and kidney function 





Code(s): I48.92 - UNSPECIFIED ATRIAL FLUTTER   


Qualifiers: 


   Atrial flutter type: unspecified   Qualified Code(s): I48.92 - Unspecified 

atrial flutter   





(6) Chronic cor pulmonale


Assessment/Plan: 


stable 


c/w nebs


pulmonary consultation 





Code(s): I27.81 - COR PULMONALE (CHRONIC)   





(7) Hyperlipidemia


Assessment/Plan: 


c/w atrovastatin 


Code(s): E78.5 - HYPERLIPIDEMIA, UNSPECIFIED   


Qualifiers: 


   Hyperlipidemia type: unspecified   Qualified Code(s): E78.5 - Hyperlipidemia

, unspecified   





(8) Hypertension


Assessment/Plan: 


stable 


c/w home medication 





Code(s): I10 - ESSENTIAL (PRIMARY) HYPERTENSION   


Qualifiers: 


   Hypertension type: essential hypertension   Qualified Code(s): I10 - 

Essential (primary) hypertension   





(9) Hypothyroidism


Assessment/Plan: 


stable c/w levothyroxine 


Code(s): E03.9 - HYPOTHYROIDISM, UNSPECIFIED   


Qualifiers: 


   Hypothyroidism type: unspecified   Qualified Code(s): E03.9 - Hypothyroidism

, unspecified   





(10) Acute respiratory acidosis


Assessment/Plan: 


acute on chronic CO2 retention 2/2 to chronic COPD exacacerbation 


respondinf to BiPAP 





Code(s): E87.2 - ACIDOSIS   





(11) Chronic respiratory acidosis


Assessment/Plan: 


2/2 to chronic COPD and CO2 retention 


Code(s): E87.2 - ACIDOSIS

## 2019-04-01 LAB
ARTERIAL BLOOD GAS PCO2: 60.1 MMHG (ref 35–45)
ARTERIAL PATENCY WRIST A: POSITIVE
BASE EXCESS BLDA CALC-SCNC: 10.5 MEQ/L (ref -2–2)
PO2 BLDA: 57.5 MMHG (ref 80–105)
SAO2 % BLDA: 89 % (ref 95–98)

## 2019-04-01 RX ADMIN — DIGOXIN SCH MG: 125 TABLET ORAL at 11:17

## 2019-04-01 RX ADMIN — DOXYCYCLINE SCH MLS/HR: 100 INJECTION, POWDER, LYOPHILIZED, FOR SOLUTION INTRAVENOUS at 11:21

## 2019-04-01 RX ADMIN — LEVOTHYROXINE SODIUM SCH MCG: 50 TABLET ORAL at 06:11

## 2019-04-01 RX ADMIN — IPRATROPIUM BROMIDE AND ALBUTEROL SULFATE SCH AMP: .5; 3 SOLUTION RESPIRATORY (INHALATION) at 15:50

## 2019-04-01 RX ADMIN — OMEGA-3-ACID ETHYL ESTERS SCH GM: 1 CAPSULE, LIQUID FILLED ORAL at 11:17

## 2019-04-01 RX ADMIN — IPRATROPIUM BROMIDE AND ALBUTEROL SULFATE SCH AMP: .5; 3 SOLUTION RESPIRATORY (INHALATION) at 11:19

## 2019-04-01 RX ADMIN — DOXYCYCLINE SCH MLS/HR: 100 INJECTION, POWDER, LYOPHILIZED, FOR SOLUTION INTRAVENOUS at 22:39

## 2019-04-01 RX ADMIN — PREDNISONE SCH MG: 20 TABLET ORAL at 11:17

## 2019-04-01 RX ADMIN — CEFTRIAXONE SCH MLS/HR: 1 INJECTION, POWDER, FOR SOLUTION INTRAMUSCULAR; INTRAVENOUS at 11:20

## 2019-04-01 RX ADMIN — ATORVASTATIN CALCIUM SCH MG: 10 TABLET, FILM COATED ORAL at 21:41

## 2019-04-01 RX ADMIN — APIXABAN SCH MG: 5 TABLET, FILM COATED ORAL at 11:17

## 2019-04-01 RX ADMIN — DONEPEZIL HYDROCHLORIDE SCH MG: 10 TABLET, FILM COATED ORAL at 21:42

## 2019-04-01 RX ADMIN — OXYCODONE HYDROCHLORIDE AND ACETAMINOPHEN SCH MG: 500 TABLET ORAL at 11:17

## 2019-04-01 RX ADMIN — APIXABAN SCH MG: 5 TABLET, FILM COATED ORAL at 21:41

## 2019-04-01 RX ADMIN — FUROSEMIDE SCH MG: 10 INJECTION, SOLUTION INTRAVENOUS at 06:11

## 2019-04-01 RX ADMIN — IPRATROPIUM BROMIDE AND ALBUTEROL SULFATE SCH AMP: .5; 3 SOLUTION RESPIRATORY (INHALATION) at 07:36

## 2019-04-01 RX ADMIN — OMEGA-3-ACID ETHYL ESTERS SCH GM: 1 CAPSULE, LIQUID FILLED ORAL at 21:41

## 2019-04-01 RX ADMIN — IPRATROPIUM BROMIDE AND ALBUTEROL SULFATE SCH AMP: .5; 3 SOLUTION RESPIRATORY (INHALATION) at 20:10

## 2019-04-01 RX ADMIN — FUROSEMIDE SCH MG: 10 INJECTION, SOLUTION INTRAVENOUS at 15:19

## 2019-04-01 NOTE — PN
Progress Note (short form)





- Note


Progress Note: 


s: no chest pain, palps. stable sob, edema of RLE





 Current Medications





Albuterol Sulfate (Ventolin 0.083% Nebulizer Soln -)  1 amp NEB Q6H PRN


   PRN Reason: SHORT OF BREATH/WHEEZING


Albuterol/Ipratropium (Duoneb -)  1 amp NEB RQID [facility]


   Last Admin: 04/01/19 11:19 Dose:  1 amp


Apixaban (Eliquis -)  5 mg PO BID [facility]


   Last Admin: 04/01/19 11:17 Dose:  5 mg


Ascorbic Acid (Vitamin C -)  500 mg PO DAILY [facility]


   Last Admin: 04/01/19 11:17 Dose:  500 mg


Atorvastatin Calcium (Lipitor -)  10 mg PO HS [facility]


   Last Admin: 03/31/19 21:08 Dose:  10 mg


Carbamazepine (Tegretol -)  200 mg PO HS [facility]


   Last Admin: 03/31/19 21:08 Dose:  200 mg


Digoxin (Lanoxin -)  0.125 mg PO DAILY [facility]


   Last Admin: 04/01/19 11:17 Dose:  0.125 mg


Donepezil HCl (Aricept -)  10 mg PO HS [facility]


   Last Admin: 03/31/19 21:09 Dose:  10 mg


Furosemide (Lasix Injection -)  80 mg IVPUSH BIDLASIX [facility]


   Last Admin: 04/01/19 15:19 Dose:  80 mg


Doxycycline Hyclate 100 mg/ (Dextrose)  100 mls @ 50 mls/hr IVPB BID [facility]


   Last Admin: 04/01/19 11:21 Dose:  50 mls/hr


Ceftriaxone Sodium 0.5 gm/ (Dextrose)  50 mls @ 100 mls/hr IVPB DAILY [facility]; 

Protocol


   Last Admin: 04/01/19 11:20 Dose:  100 mls/hr


Levothyroxine Sodium (Synthroid -)  50 mcg PO DAILY@0700 [facility]


   Last Admin: 04/01/19 06:11 Dose:  50 mcg


Non-Formulary Medication (Memantine Hcl [Namenda Xr])  14 mg PO AM [facility]


Omega-3-Acid Ethyl Esters (Lovaza -)  1 gm PO BID [facility]


   Last Admin: 04/01/19 11:17 Dose:  1 gm


Prednisone (Deltasone -)  40 mg PO DAILY [facility]


   Last Admin: 04/01/19 11:17 Dose:  40 mg











PE: 


 Vital Signs











 Period  Temp  Pulse  Resp  BP Sys/Gonsales  Pulse Ox


 


 Last 24 Hr  97 F-98 F  53-87  20-23  120-149/73-86  90-97














NAD, +JVD


Irregular rate and rhythm. Nl S1, S2. 2/6 murmur at LSB


exp wheezes scattered


aaox 3


RLE 3+ pitting edema to knee


+BS, soft, NT, distended. 


Pos dp/PT


No jaundice, diaphoresis. 





 


Echo 2/18/16: Nl LV. RV mild-mod dilation with mild-mod systolic dysfunction.  

Severe pHTN (RVSP 62), mild TR. Mild Ao dilation.  





Pharm nuc stress 2/18/16: Afib with RVR. Asx. No ischemic changes. Nl perf. Nl 

EF. 





CXR: no congestion





CT chest: extensive COPD changes





EKG: sinus, PACs, afib





RLE ultrasound, no DVT, R popliteal artery aneurysm





echo 4/2019 low nl LV function, mildly reduced RV function, mildly dilated RA, 

mild MAC, mod TR, PASP at least 70 mmHg, mild AR, borderline aortic root 

dilation





tele: sinus





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 

on coronary calcifications on chest CT, stable aortic dilation of the ascending 

and abdominal aorta, HTN/HL, remote history of DVT, afib on eliquis p/w SOB, 

cough.





SOB, cough, COPD


- CT with significant findings for COPD, receiving nebs and steroids per primary


- also received IV lasix for CHF, missed torsemide doses





hx R heart failure, pulm HTN


- BNP >11,000 


- echo shows severe pulm HTN, low normal RV function


- received lasix 40 mg IV x 1, now on 80 mg IV BID - has abd distension, which 

he has had in the past with volume overload


- weight downtrending


- cont IV lasix, monitor Cr, lytes, daily standing weights





Atrial fibrillation. 


- cont eliquis, digoxin





popliteal artery aneurysm


- vascular consulted, Dr. Taylor


 


HL: 


- cont statin

## 2019-04-01 NOTE — PN
Physical Exam: 


SUBJECTIVE: Patient seen and examined





24HR events:


-pt remains hypoxic on routine ABG but reports improved dyspnea


-CTA aorta with b/l LE  runoff ordered for today


-Chest CT with resolution of left lingular mass





OBJECTIVE:





 Vital Signs











 Period  Temp  Pulse  Resp  BP Sys/Gonsales  Pulse Ox


 


 Last 24 Hr  97 F-98 F  53-87  20-23  120-149/73-86  90-97











GENERAL: The patient is awake, alert, and fully oriented, in no acute distress.


HEAD: Normal with no signs of trauma.


EYES: PERRL, sclera anicteric, conjunctiva clear. 


ENT: Ears normal, nares patent, oropharynx clear without exudates, moist mucous 

membranes.


NECK: Trachea midline, full range of motion, supple. 


LUNGS: coarse BS diminished at the bases. no accessory muscle use. 


HEART: Regular rate and rhythm, S1, S2 


ABDOMEN: Soft, nontender, nondistended, normoactive bowel sounds, no guarding, 

no 


rebound, no hepatosplenomegaly, no masses.


EXTREMITIES: 2+ pulses, warm, well-perfused, RLE calf edema, venous stasis 

changes


NEUROLOGICAL: AAOX3 Normal speech, gait not observed.


PSYCH: Normal mood, normal affect.


SKIN: Warm, dry, normal turgor, no rashes or lesions noted














 Laboratory Results - last 24 hr











  04/01/19





  09:28


 


Anticoagulation Therapy  No Result Required.


 


Puncture Site  Right radial


 


ABG pH  7.41


 


ABG pCO2 at Pt Temp  60.1 H


 


ABG pO2 at Pt Temp  57.5 L


 


ABG HCO3  37.6 H


 


ABG O2 Sat (Measured)  89.0 L


 


ABG O2 Content  19.1


 


ABG Base Excess  10.5 H


 


Hector Test  Positive


 


O2 Delivery Device  No Result Required.


 


Oxygen Flow Rate  3l


 


Vent Mode  No Result Required.


 


Vent Rate  No Result Required.


 


Mechanical Rate  No Result Required.


 


Pressure Support Vent  No Result Required.








Active Medications











Generic Name Dose Route Start Last Admin





  Trade Name Freq  PRN Reason Stop Dose Admin


 


Albuterol Sulfate  1 amp  03/31/19 03:41  





  Ventolin 0.083% Nebulizer Soln -  NEB   





  Q6H PRN   





  SHORT OF BREATH/WHEEZING   





     





     





     


 


Albuterol/Ipratropium  1 amp  03/31/19 16:00  04/01/19 15:50





  Duoneb -  NEB   1 amp





  RQID GARY   Administration





     





     





     





     


 


Apixaban  5 mg  03/31/19 10:00  04/01/19 11:17





  Eliquis -  PO   5 mg





  BID GARY   Administration





     





     





     





     


 


Ascorbic Acid  500 mg  03/31/19 10:00  04/01/19 11:17





  Vitamin C -  PO   500 mg





  DAILY GARY   Administration





     





     





     





     


 


Atorvastatin Calcium  10 mg  03/31/19 22:00  03/31/19 21:08





  Lipitor -  PO   10 mg





  HS GARY   Administration





     





     





     





     


 


Carbamazepine  200 mg  03/31/19 22:00  03/31/19 21:08





  Tegretol -  PO   200 mg





  HS GARY   Administration





     





     





     





     


 


Digoxin  0.125 mg  03/31/19 10:00  04/01/19 11:17





  Lanoxin -  PO   0.125 mg





  DAILY GARY   Administration





     





     





     





     


 


Donepezil HCl  10 mg  03/31/19 22:00  03/31/19 21:09





  Aricept -  PO   10 mg





  HS GARY   Administration





     





     





     





     


 


Furosemide  80 mg  03/31/19 14:00  04/01/19 15:19





  Lasix Injection -  IVPUSH   80 mg





  BIDLASIX GARY   Administration





     





     





     





     


 


Doxycycline Hyclate 100 mg/  100 mls @ 50 mls/hr  03/31/19 10:00  04/01/19 11:21





  Dextrose  IVPB   50 mls/hr





  BID GARY   Administration





     





     





     





     


 


Ceftriaxone Sodium 0.5 gm/  50 mls @ 100 mls/hr  03/31/19 16:00  04/01/19 11:20





  Dextrose  IVPB   100 mls/hr





  DAILY GARY   Administration





     





     





  Protocol   





     


 


Levothyroxine Sodium  50 mcg  03/31/19 07:00  04/01/19 06:11





  Synthroid -  PO   50 mcg





  DAILY@0700 GARY   Administration





     





     





     





     


 


Non-Formulary Medication  14 mg  03/31/19 07:00  





  Memantine Hcl [Namenda Xr]  PO   





  AM GARY   





     





     





     





     


 


Omega-3-Acid Ethyl Esters  1 gm  03/31/19 10:00  04/01/19 11:17





  Lovaza -  PO   1 gm





  BID GARY   Administration





     





     





     





     


 


Prednisone  40 mg  04/01/19 10:00  04/01/19 11:17





  Deltasone -  PO   40 mg





  DAILY GARY   Administration





     





     





     





     











ASSESSMENT/PLAN:


COPD


-c/w BiPAP


-trend the ABG 


-pulmonary following, recs appreciated


-PRN nebs


-Prednisone 40mg daily





Aneurysm of right popliteal artery


-vasc surg following, CTA aorta with runoff pending





CHF


-lasix 80mg IVP BID


-Echo done 4/1: mod TR, EF 50-55%, no pericardial effusion





Afib


-digoxin 0.125mg daily


-eliquis BID





HLD


-lipitor 10mg qhs





Dementia


-aricept 10mg qhs





Hypothyroidism


-synthroid 50mcg daily








Problem List





- Problems


(1) Aneurysm of right popliteal artery


Code(s): I72.4 - ANEURYSM OF ARTERY OF LOWER EXTREMITY   





(2) COPD with acute exacerbation


Code(s): J44.1 - CHRONIC OBSTRUCTIVE PULMONARY DISEASE W (ACUTE) EXACERBATION   





(3) Dementia


Code(s): F03.90 - UNSPECIFIED DEMENTIA WITHOUT BEHAVIORAL DISTURBANCE   


Qualifiers: 


   Dementia type: unspecified type   Dementia behavioral disturbance: without 

behavioral disturbance   Qualified Code(s): F03.90 - Unspecified dementia 

without behavioral disturbance   





(4) Hyperlipidemia


Code(s): E78.5 - HYPERLIPIDEMIA, UNSPECIFIED   


Qualifiers: 


   Hyperlipidemia type: unspecified   Qualified Code(s): E78.5 - Hyperlipidemia

, unspecified   





(5) Hypertension


Code(s): I10 - ESSENTIAL (PRIMARY) HYPERTENSION   


Qualifiers: 


   Hypertension type: essential hypertension   Qualified Code(s): I10 - 

Essential (primary) hypertension   





(6) Hypothyroidism


Code(s): E03.9 - HYPOTHYROIDISM, UNSPECIFIED   


Qualifiers: 


   Hypothyroidism type: unspecified   Qualified Code(s): E03.9 - Hypothyroidism

, unspecified   





(7) Obstructive sleep apnea


Code(s): G47.33 - OBSTRUCTIVE SLEEP APNEA (ADULT) (PEDIATRIC)   





Visit type





- Emergency Visit


Emergency Visit: Yes


ED Registration Date: 03/30/19


Care time: The patient presented to the Emergency Department on the above date 

and was hospitalized for further evaluation of their emergent condition.





- New Patient


This patient is new to me today: Yes


Date on this admission: 04/01/19





- Critical Care


Critical Care patient: No





- Discharge Referral


Referred to [facility] No

## 2019-04-01 NOTE — PN
Progress Note (short form)





- Note


Progress Note: 


Feels better today. 


Less cough and SOB.  





CT chest: significant resolution of left lingular consolidation/mass with some 

minimal fibrotic changes.  New RLL consolidation / effusion 


 


 


 Intake & Output











 03/29/19 03/30/19 03/31/19 04/01/19





 23:59 23:59 23:59 23:59


 


Intake Total  20 1650 60


 


Output Total  1275 4750 3120


 


Balance  -1255 -1070 -3060


 


Weight  177 lb 6.4 oz 177 lb 3.2 oz 164 lb 12.8 oz








 Last Vital Signs











Temp Pulse Resp BP Pulse Ox


 


 98 F   70   20   120/76   90 L


 


 04/01/19 09:00  04/01/19 11:17  04/01/19 09:00  04/01/19 09:00  04/01/19 07:35








Active Medications





Albuterol Sulfate (Ventolin 0.083% Nebulizer Soln -)  1 amp NEB Q6H PRN


   PRN Reason: SHORT OF BREATH/WHEEZING


Albuterol/Ipratropium (Duoneb -)  1 amp NEB RQID [facility]


   Last Admin: 04/01/19 11:19 Dose:  1 amp


Apixaban (Eliquis -)  5 mg PO BID [facility]


   Last Admin: 04/01/19 11:17 Dose:  5 mg


Ascorbic Acid (Vitamin C -)  500 mg PO DAILY [facility]


   Last Admin: 04/01/19 11:17 Dose:  500 mg


Atorvastatin Calcium (Lipitor -)  10 mg PO HS [facility]


   Last Admin: 03/31/19 21:08 Dose:  10 mg


Carbamazepine (Tegretol -)  200 mg PO HS [facility]


   Last Admin: 03/31/19 21:08 Dose:  200 mg


Digoxin (Lanoxin -)  0.125 mg PO DAILY [facility]


   Last Admin: 04/01/19 11:17 Dose:  0.125 mg


Donepezil HCl (Aricept -)  10 mg PO HS [facility]


   Last Admin: 03/31/19 21:09 Dose:  10 mg


Furosemide (Lasix Injection -)  80 mg IVPUSH BIDLASIX [facility]


   Last Admin: 04/01/19 06:11 Dose:  80 mg


Doxycycline Hyclate 100 mg/ (Dextrose)  100 mls @ 50 mls/hr IVPB BID [facility]


   Last Admin: 04/01/19 11:21 Dose:  50 mls/hr


Ceftriaxone Sodium 0.5 gm/ (Dextrose)  50 mls @ 100 mls/hr IVPB DAILY [facility]; 

Protocol


   Last Admin: 04/01/19 11:20 Dose:  100 mls/hr


Levothyroxine Sodium (Synthroid -)  50 mcg PO DAILY@0700 [facility]


   Last Admin: 04/01/19 06:11 Dose:  50 mcg


Non-Formulary Medication (Memantine Hcl [Namenda Xr])  14 mg PO AM [facility]


Omega-3-Acid Ethyl Esters (Lovaza -)  1 gm PO BID [facility]


   Last Admin: 04/01/19 11:17 Dose:  1 gm


Prednisone (Deltasone -)  40 mg PO DAILY [facility]


   Last Admin: 04/01/19 11:17 Dose:  40 mg











Constitutional: Yes: NAD 


Eyes: Yes: Conjunctiva Clear, EOM Intact


HENT: Yes: Atraumatic, Normocephalic


Neck: Yes: Supple, Trachea Midline


Cardiovascular: Yes: Pulse Irregular


Respiratory: Yes: Cough, Diminished, Rhonchi, SOB, SOB on Exertion, Tachypnea, 

Wheezes.  No: Accessory Muscle Use, Stridor


...Inspection: Yes: WNL


...Clubbing: No


Gastrointestinal: Yes: Normal Bowel Sounds, Soft


Renal/: Yes: WNL


Musculoskeletal: Yes: WNL


Extremities: Yes: WNL


Edema: Yes


Peripheral Pulses WNL: Yes


Integumentary: Yes: WNL


Neurological: Yes: WNL, Alert, Oriented


...Motor Strength: WNL


Psychiatric: Yes: WNL, Alert, Oriented


Labs: 


 








 Laboratory Results - last 24 hr











  04/01/19





  09:28


 


Anticoagulation Therapy  No Result Required.


 


Puncture Site  Right radial


 


ABG pH  7.41


 


ABG pCO2 at Pt Temp  60.1 H


 


ABG pO2 at Pt Temp  57.5 L


 


ABG HCO3  37.6 H


 


ABG O2 Sat (Measured)  89.0 L


 


ABG O2 Content  19.1


 


ABG Base Excess  10.5 H


 


Hector Test  Positive


 


O2 Delivery Device  No Result Required.


 


Oxygen Flow Rate  3l


 


Vent Mode  No Result Required.


 


Vent Rate  No Result Required.


 


Mechanical Rate  No Result Required.


 


Pressure Support Vent  No Result Required.











Problem List





- Problems


(1) Community acquired pneumonia


Code(s): J18.9 - PNEUMONIA, UNSPECIFIED ORGANISM   





(2) Acute respiratory acidosis


Code(s): E87.2 - ACIDOSIS   





(3) Acute respiratory failure with hypoxia and hypercarbia


Code(s): J96.01 - ACUTE RESPIRATORY FAILURE WITH HYPOXIA; J96.02 - ACUTE 

RESPIRATORY FAILURE WITH HYPERCAPNIA   





(4) Aneurysm of right popliteal artery


Code(s): I72.4 - ANEURYSM OF ARTERY OF LOWER EXTREMITY   





(5) COPD with acute exacerbation


Code(s): J44.1 - CHRONIC OBSTRUCTIVE PULMONARY DISEASE W (ACUTE) EXACERBATION   





(6) Chronic respiratory acidosis


Code(s): E87.2 - ACIDOSIS   





(7) Leg edema, right


Code(s): R60.0 - LOCALIZED EDEMA   





(8) Right ventricular systolic dysfunction


Code(s): I51.9 - HEART DISEASE, UNSPECIFIED   





(9) Atrial flutter


Code(s): I48.92 - UNSPECIFIED ATRIAL FLUTTER   


Qualifiers: 


   Atrial flutter type: unspecified   Qualified Code(s): I48.92 - Unspecified 

atrial flutter   





(10) COPD (chronic obstructive pulmonary disease)


Code(s): J44.9 - CHRONIC OBSTRUCTIVE PULMONARY DISEASE, UNSPECIFIED   


Qualifiers: 


   COPD type: COPD with acute exacerbation   Qualified Code(s): J44.1 - Chronic 

obstructive pulmonary disease with (acute) exacerbation   





(11) Chronic cor pulmonale


Code(s): I27.81 - COR PULMONALE (CHRONIC)   





(12) Dementia


Code(s): F03.90 - UNSPECIFIED DEMENTIA WITHOUT BEHAVIORAL DISTURBANCE   


Qualifiers: 


   Dementia type: unspecified type   Dementia behavioral disturbance: without 

behavioral disturbance   Qualified Code(s): F03.90 - Unspecified dementia 

without behavioral disturbance   





(13) Hyperlipidemia


Code(s): E78.5 - HYPERLIPIDEMIA, UNSPECIFIED   


Qualifiers: 


   Hyperlipidemia type: unspecified   Qualified Code(s): E78.5 - Hyperlipidemia

, unspecified   





(14) Hypertension


Code(s): I10 - ESSENTIAL (PRIMARY) HYPERTENSION   


Qualifiers: 


   Hypertension type: essential hypertension   Qualified Code(s): I10 - 

Essential (primary) hypertension   





(15) Hypothyroidism


Code(s): E03.9 - HYPOTHYROIDISM, UNSPECIFIED   


Qualifiers: 


   Hypothyroidism type: unspecified   Qualified Code(s): E03.9 - Hypothyroidism

, unspecified   





(16) Obstructive sleep apnea


Code(s): G47.33 - OBSTRUCTIVE SLEEP APNEA (ADULT) (PEDIATRIC)   





(17) Pulmonary hypertension


Code(s): I27.2 - OTHER SECONDARY PULMONARY HYPERTENSION * DO NOT USE *   





Assessment/Plan


RLL PNA 





Continue ABX 


Check sputum 


NC O2 as tolerated 


NIPPV QHS and PRN 


No smoking discussed


AC 


BD TX standing and PRN 


Continuous oxymetry monitoring








Dr Flor 











Problem List





- Problems


(1) Community acquired pneumonia


Code(s): J18.9 - PNEUMONIA, UNSPECIFIED ORGANISM   





(2) Acute respiratory acidosis


Code(s): E87.2 - ACIDOSIS   





(3) Acute respiratory failure with hypoxia and hypercarbia


Code(s): J96.01 - ACUTE RESPIRATORY FAILURE WITH HYPOXIA; J96.02 - ACUTE 

RESPIRATORY FAILURE WITH HYPERCAPNIA   





(4) Aneurysm of right popliteal artery


Code(s): I72.4 - ANEURYSM OF ARTERY OF LOWER EXTREMITY   





(5) COPD with acute exacerbation


Code(s): J44.1 - CHRONIC OBSTRUCTIVE PULMONARY DISEASE W (ACUTE) EXACERBATION   





(6) Chronic respiratory acidosis


Code(s): E87.2 - ACIDOSIS   





(7) Leg edema, right


Code(s): R60.0 - LOCALIZED EDEMA   





(8) Right ventricular systolic dysfunction


Code(s): I51.9 - HEART DISEASE, UNSPECIFIED   





(9) Atrial flutter


Code(s): I48.92 - UNSPECIFIED ATRIAL FLUTTER   


Qualifiers: 


   Atrial flutter type: unspecified   Qualified Code(s): I48.92 - Unspecified 

atrial flutter   





(10) COPD (chronic obstructive pulmonary disease)


Code(s): J44.9 - CHRONIC OBSTRUCTIVE PULMONARY DISEASE, UNSPECIFIED   


Qualifiers: 


   COPD type: COPD with acute exacerbation   Qualified Code(s): J44.1 - Chronic 

obstructive pulmonary disease with (acute) exacerbation   





(11) Chronic cor pulmonale


Code(s): I27.81 - COR PULMONALE (CHRONIC)   





(12) Dementia


Code(s): F03.90 - UNSPECIFIED DEMENTIA WITHOUT BEHAVIORAL DISTURBANCE   


Qualifiers: 


   Dementia type: unspecified type   Dementia behavioral disturbance: without 

behavioral disturbance   Qualified Code(s): F03.90 - Unspecified dementia 

without behavioral disturbance   





(13) Hyperlipidemia


Code(s): E78.5 - HYPERLIPIDEMIA, UNSPECIFIED   


Qualifiers: 


   Hyperlipidemia type: unspecified   Qualified Code(s): E78.5 - Hyperlipidemia

, unspecified   





(14) Hypertension


Code(s): I10 - ESSENTIAL (PRIMARY) HYPERTENSION   


Qualifiers: 


   Hypertension type: essential hypertension   Qualified Code(s): I10 - 

Essential (primary) hypertension   





(15) Hypothyroidism


Code(s): E03.9 - HYPOTHYROIDISM, UNSPECIFIED   


Qualifiers: 


   Hypothyroidism type: unspecified   Qualified Code(s): E03.9 - Hypothyroidism

, unspecified   





(16) Obstructive sleep apnea


Code(s): G47.33 - OBSTRUCTIVE SLEEP APNEA (ADULT) (PEDIATRIC)   





(17) Pulmonary hypertension


Code(s): I27.2 - OTHER SECONDARY PULMONARY HYPERTENSION * DO NOT USE *

## 2019-04-01 NOTE — ECHO
______________________________________________________________________________



Name: FRANCO STUBBS                                  Exam:Adult Echocardiogram

MRN: [id number]         Study Date: 2019 02:12 PM

Age: 84 yrs

______________________________________________________________________________



Reason For Study: CHF

Height: 70 in        Weight: 177 lb        BSA: 2.0 m2



______________________________________________________________________________



MMode/2D Measurements & Calculations

IVSd: 0.81 cm                                          Ao root diam: 3.9 cm

LVIDd: 4.9 cm                                          LA dimension: 3.2 cm

LVIDs: 3.6 cm

LVPWd: 0.76 cm



________________________________________________________

EDV(Teich): 112.7 ml                                   LVOT diam: 2.2 cm

ESV(Teich): 53.6 ml



Doppler Measurements & Calculations

MV E max beau: 70.6 cm/sec                                 Ao V2 max: 107.2 cm/sec

MV A max beau: 51.8 cm/sec                                 Ao max P.6 mmHg

MV E/A: 1.4                                               Ao V2 mean: 74.5 cm/sec

MV dec time: 0.16 sec                                     Ao mean P.5 mmHg

                                                          Ao V2 VTI: 20.2 cm



                                                          DAVE(I,D): 2.6 cm2

                                                          AI P1/2t: 469.1 msec

                                                          DAVE(V,D): 2.8 cm2



___________________________________________________________

AI max beau: 423.5 cm/sec                                  LV V1 max P.2 mmHg

AI max P.8 mmHg                                      LV V1 mean P.2 mmHg

AI dec slope: 264.4 cm/sec2                               LV V1 max: 74.4 cm/sec

                                                          LV V1 mean: 48.7 cm/sec

                                                          LV V1 VTI: 13.2 cm

___________________________________________________________



SV(LVOT): 52.5 ml                                         TR max beau: 365.9 cm/sec

                                                          TR max P.6 mmHg

___________________________________________________________



Med Peak E' Beau: 6.4 cm/sec

Med E/e': 11.0

Lat Peak E' Beau: 8.5 cm/sec

Lat E/e': 8.3



______________________________________________________________________________



Procedure

A complete two-dimensional transthoracic echocardiogram was performed (2D, M-mode, Doppler and color 
flow

Doppler).

Left Ventricle

The left ventricle is normal in size. Left ventricular systolic function is low normal. Ejection Frac
tion =

50-55%. No regional wall motion abnormalities noted.

Right Ventricle

The right ventricle is normal size. The right ventricular systolic function is mildly reduced. RV sys
tolic TDI

is 8 cm/s.

Atria

The left atrial size is normal. The right atrium is mildly dilated.

Mitral Valve

There is mild mitral annular calcification. There is no mitral regurgitation noted.

Tricuspid Valve

The tricuspid valve is normal in structure and function. There is moderate tricuspid regurgitation. P
ulmonary

artery systolic pressure is at least 70 mmHg assuming RA pressure of 8 mmHg (dilated IVC with >50% co
llapse).

Aortic Valve

There is mild aortic sclerosis.;. Mild aortic regurgitation.

Pulmonic Valve

The pulmonic valve is not well visualized.

Great Vessels

Borderline aortic root dilatation.

Pericardium/Pleura

There is no pericardial effusion.

______________________________________________________________________________





Interpretation Summary

The left ventricle is normal in size.

Left ventricular systolic function is low normal.

No regional wall motion abnormalities noted.

Ejection Fraction = 50-55%.

The right ventricular systolic function is mildly reduced.

The left atrial size is normal.

The right atrium is mildly dilated.

There is mild mitral annular calcification.

There is moderate tricuspid regurgitation.

Pulmonary artery systolic pressure is at least 70 mmHg assuming RA pressure of 8 mmHg (dilated IVC wi
th >50%

collapse)

There is mild aortic sclerosis.;

Mild aortic regurgitation.

Borderline aortic root dilatation.

There is no pericardial effusion.



Previous study is not available for comparison





Toro Anthony MD 2019 03:25 PM

## 2019-04-02 LAB
ALBUMIN SERPL-MCNC: 3 G/DL (ref 3.4–5)
ALP SERPL-CCNC: 59 U/L (ref 45–117)
ALT SERPL-CCNC: 12 U/L (ref 13–61)
ANION GAP SERPL CALC-SCNC: 7 MMOL/L (ref 8–16)
AST SERPL-CCNC: 17 U/L (ref 15–37)
BILIRUB SERPL-MCNC: 0.7 MG/DL (ref 0.2–1)
BUN SERPL-MCNC: 29 MG/DL (ref 7–18)
CALCIUM SERPL-MCNC: 8.3 MG/DL (ref 8.5–10.1)
CHLORIDE SERPL-SCNC: 91 MMOL/L (ref 98–107)
CO2 SERPL-SCNC: 38 MMOL/L (ref 21–32)
CREAT SERPL-MCNC: 1.4 MG/DL (ref 0.55–1.3)
DEPRECATED RDW RBC AUTO: 16.1 % (ref 11.9–15.9)
GLUCOSE SERPL-MCNC: 75 MG/DL (ref 74–106)
HCT VFR BLD CALC: 45.4 % (ref 35.4–49)
HGB BLD-MCNC: 15 GM/DL (ref 11.7–16.9)
MCH RBC QN AUTO: 33.6 PG (ref 25.7–33.7)
MCHC RBC AUTO-ENTMCNC: 32.9 G/DL (ref 32–35.9)
MCV RBC: 102.1 FL (ref 80–96)
PLATELET # BLD AUTO: 118 K/MM3 (ref 134–434)
PMV BLD: 7.3 FL (ref 7.5–11.1)
POTASSIUM SERPLBLD-SCNC: 3.4 MMOL/L (ref 3.5–5.1)
PROT SERPL-MCNC: 6.6 G/DL (ref 6.4–8.2)
RBC # BLD AUTO: 4.45 M/MM3 (ref 4–5.6)
SODIUM SERPL-SCNC: 135 MMOL/L (ref 136–145)
WBC # BLD AUTO: 5.7 K/MM3 (ref 4–10)

## 2019-04-02 RX ADMIN — IPRATROPIUM BROMIDE AND ALBUTEROL SULFATE SCH AMP: .5; 3 SOLUTION RESPIRATORY (INHALATION) at 15:57

## 2019-04-02 RX ADMIN — DOXYCYCLINE SCH MLS/HR: 100 INJECTION, POWDER, LYOPHILIZED, FOR SOLUTION INTRAVENOUS at 23:17

## 2019-04-02 RX ADMIN — IPRATROPIUM BROMIDE AND ALBUTEROL SULFATE SCH AMP: .5; 3 SOLUTION RESPIRATORY (INHALATION) at 20:00

## 2019-04-02 RX ADMIN — FUROSEMIDE SCH MG: 10 INJECTION, SOLUTION INTRAVENOUS at 14:09

## 2019-04-02 RX ADMIN — OMEGA-3-ACID ETHYL ESTERS SCH GM: 1 CAPSULE, LIQUID FILLED ORAL at 10:10

## 2019-04-02 RX ADMIN — LEVOTHYROXINE SODIUM SCH MCG: 50 TABLET ORAL at 06:53

## 2019-04-02 RX ADMIN — APIXABAN SCH MG: 5 TABLET, FILM COATED ORAL at 10:09

## 2019-04-02 RX ADMIN — DONEPEZIL HYDROCHLORIDE SCH MG: 10 TABLET, FILM COATED ORAL at 23:18

## 2019-04-02 RX ADMIN — DIGOXIN SCH MG: 125 TABLET ORAL at 10:10

## 2019-04-02 RX ADMIN — ATORVASTATIN CALCIUM SCH MG: 10 TABLET, FILM COATED ORAL at 23:17

## 2019-04-02 RX ADMIN — OXYCODONE HYDROCHLORIDE AND ACETAMINOPHEN SCH MG: 500 TABLET ORAL at 10:10

## 2019-04-02 RX ADMIN — IPRATROPIUM BROMIDE AND ALBUTEROL SULFATE SCH: .5; 3 SOLUTION RESPIRATORY (INHALATION) at 12:00

## 2019-04-02 RX ADMIN — APIXABAN SCH MG: 5 TABLET, FILM COATED ORAL at 23:17

## 2019-04-02 RX ADMIN — CEFTRIAXONE SCH MLS/HR: 1 INJECTION, POWDER, FOR SOLUTION INTRAMUSCULAR; INTRAVENOUS at 10:08

## 2019-04-02 RX ADMIN — FUROSEMIDE SCH MG: 10 INJECTION, SOLUTION INTRAVENOUS at 06:53

## 2019-04-02 RX ADMIN — DOXYCYCLINE SCH MLS/HR: 100 INJECTION, POWDER, LYOPHILIZED, FOR SOLUTION INTRAVENOUS at 10:08

## 2019-04-02 RX ADMIN — IPRATROPIUM BROMIDE AND ALBUTEROL SULFATE SCH AMP: .5; 3 SOLUTION RESPIRATORY (INHALATION) at 09:21

## 2019-04-02 RX ADMIN — OMEGA-3-ACID ETHYL ESTERS SCH GM: 1 CAPSULE, LIQUID FILLED ORAL at 23:17

## 2019-04-02 RX ADMIN — PREDNISONE SCH MG: 20 TABLET ORAL at 10:09

## 2019-04-02 NOTE — PN
Progress Note, Physician


History of Present Illness: 





pulmonary





alert,on vm,dyspneic,mildly tachypneic





- Current Medication List


Current Medications: 


Active Medications





Albuterol Sulfate (Ventolin 0.083% Nebulizer Soln -)  1 amp NEB Q6H PRN


   PRN Reason: SHORT OF BREATH/WHEEZING


Albuterol/Ipratropium (Duoneb -)  1 amp NEB RQID [facility]


   Last Admin: 04/02/19 09:21 Dose:  1 amp


Apixaban (Eliquis -)  5 mg PO BID [facility]


   Last Admin: 04/01/19 21:41 Dose:  5 mg


Ascorbic Acid (Vitamin C -)  500 mg PO DAILY [facility]


   Last Admin: 04/01/19 11:17 Dose:  500 mg


Atorvastatin Calcium (Lipitor -)  10 mg PO HS [facility]


   Last Admin: 04/01/19 21:41 Dose:  10 mg


Carbamazepine (Tegretol -)  200 mg PO HS [facility]


   Last Admin: 04/01/19 22:39 Dose:  200 mg


Digoxin (Lanoxin -)  0.125 mg PO DAILY [facility]


   Last Admin: 04/01/19 11:17 Dose:  0.125 mg


Docusate Sodium (Colace -)  100 mg PO Q12H PRN


   PRN Reason: CONSTIPATION


Donepezil HCl (Aricept -)  10 mg PO HS [facility]


   Last Admin: 04/01/19 21:42 Dose:  10 mg


Furosemide (Lasix Injection -)  80 mg IVPUSH BIDLASIX [facility]


   Last Admin: 04/02/19 06:53 Dose:  80 mg


Doxycycline Hyclate 100 mg/ (Dextrose)  100 mls @ 50 mls/hr IVPB BID [facility]


   Last Admin: 04/01/19 22:39 Dose:  50 mls/hr


Ceftriaxone Sodium 0.5 gm/ (Dextrose)  50 mls @ 100 mls/hr IVPB DAILY [facility]; 

Protocol


   Last Admin: 04/01/19 11:20 Dose:  100 mls/hr


Levothyroxine Sodium (Synthroid -)  50 mcg PO DAILY@0700 [facility]


   Last Admin: 04/02/19 06:53 Dose:  50 mcg


Non-Formulary Medication (Memantine Hcl [Namenda Xr])  14 mg PO AM [facility]


Omega-3-Acid Ethyl Esters (Lovaza -)  1 gm PO BID [facility]


   Last Admin: 04/01/19 21:41 Dose:  1 gm


Potassium Chloride (K-Dur -)  20 meq PO ONCE ONE


   Stop: 04/02/19 13:59


Prednisone (Deltasone -)  40 mg PO DAILY [facility]


   Last Admin: 04/01/19 11:17 Dose:  40 mg


Senna (Senna -)  2 tab PO HS PRN


   PRN Reason: CONSTIPATION











- Objective


Vital Signs: 


 Vital Signs











Temperature  97.8 F   04/02/19 08:51


 


Pulse Rate  98 H  04/02/19 08:51


 


Respiratory Rate  18   04/02/19 08:51


 


Blood Pressure  104/58 L  04/02/19 08:51


 


O2 Sat by Pulse Oximetry (%)  89 L  04/02/19 09:20











Constitutional: Yes: Well Nourished, Mild Distress


Eyes: Yes: WNL


HENT: Yes: WNL


Neck: Yes: WNL


Cardiovascular: Yes: Pulse Irregular, S1, S2


Respiratory: Yes: Rhonchi (scattered leti rhonchi)


Gastrointestinal: Yes: Normal Bowel Sounds, Soft


Extremities: Yes: WNL


Edema: No


Labs: 


 CBC, BMP





 04/02/19 05:30 





 04/02/19 05:30 





 INR, PTT











INR  1.28  (0.82-1.09)  H D 03/30/19  13:20    














Assessment/Plan





Problem List





- Problems


(1) Community acquired pneumonia


Code(s): J18.9 - PNEUMONIA, UNSPECIFIED ORGANISM   





(2) Acute respiratory acidosis


Code(s): E87.2 - ACIDOSIS   





(3) Acute respiratory failure with hypoxia and hypercarbia


Code(s): J96.01 - ACUTE RESPIRATORY FAILURE WITH HYPOXIA; J96.02 - ACUTE 

RESPIRATORY FAILURE WITH HYPERCAPNIA   





(4) Aneurysm of right popliteal artery


Code(s): I72.4 - ANEURYSM OF ARTERY OF LOWER EXTREMITY   





(5) COPD with acute exacerbation


Code(s): J44.1 - CHRONIC OBSTRUCTIVE PULMONARY DISEASE W (ACUTE) EXACERBATION   





(6) Chronic respiratory acidosis


Code(s): E87.2 - ACIDOSIS   





(7) Leg edema, right


Code(s): R60.0 - LOCALIZED EDEMA   





(8) Right ventricular systolic dysfunction


Code(s): I51.9 - HEART DISEASE, UNSPECIFIED   





(9) Atrial flutter


Code(s): I48.92 - UNSPECIFIED ATRIAL FLUTTER   


Qualifiers: 


   Atrial flutter type: unspecified   Qualified Code(s): I48.92 - Unspecified 

atrial flutter   





(10) COPD (chronic obstructive pulmonary disease)


Code(s): J44.9 - CHRONIC OBSTRUCTIVE PULMONARY DISEASE, UNSPECIFIED   


Qualifiers: 


   COPD type: COPD with acute exacerbation   Qualified Code(s): J44.1 - Chronic 

obstructive pulmonary disease with (acute) exacerbation   





(11) Chronic cor pulmonale


Code(s): I27.81 - COR PULMONALE (CHRONIC)   





(12) Dementia


Code(s): F03.90 - UNSPECIFIED DEMENTIA WITHOUT BEHAVIORAL DISTURBANCE   


Qualifiers: 


   Dementia type: unspecified type   Dementia behavioral disturbance: without 

behavioral disturbance   Qualified Code(s): F03.90 - Unspecified dementia 

without behavioral disturbance   





(13) Hyperlipidemia


Code(s): E78.5 - HYPERLIPIDEMIA, UNSPECIFIED   


Qualifiers: 


   Hyperlipidemia type: unspecified   Qualified Code(s): E78.5 - Hyperlipidemia

, unspecified   





(14) Hypertension


Code(s): I10 - ESSENTIAL (PRIMARY) HYPERTENSION   


Qualifiers: 


   Hypertension type: essential hypertension   Qualified Code(s): I10 - 

Essential (primary) hypertension   





(15) Hypothyroidism


Code(s): E03.9 - HYPOTHYROIDISM, UNSPECIFIED   


Qualifiers: 


   Hypothyroidism type: unspecified   Qualified Code(s): E03.9 - Hypothyroidism

, unspecified   





(16) Obstructive sleep apnea


Code(s): G47.33 - OBSTRUCTIVE SLEEP APNEA (ADULT) (PEDIATRIC)   





(17) Pulmonary hypertension


Code(s): I27.2 - OTHER SECONDARY PULMONARY HYPERTENSION * DO NOT USE *   





Assessment/Plan


RLL PNA 


SEVERE PULMONARY HTN


AFIB


COPD


CHRONIC HYPOXEMIC RESPIRATORY FAILURE


OSAS





 ABX 


Steroids


NC O2 as tolerated 


NIPPV QHS and PRN 


No smoking discussed


AC 


BD TX standing and PRN 


oximetry monitoring








DR EDWARDS











Problem List





- Problems


(1) Community acquired pneumonia


Code(s): J18.9 - PNEUMONIA, UNSPECIFIED ORGANISM   





(2) Acute respiratory acidosis


Code(s): E87.2 - ACIDOSIS   





(3) Acute respiratory failure with hypoxia and hypercarbia


Code(s): J96.01 - ACUTE RESPIRATORY FAILURE WITH HYPOXIA; J96.02 - ACUTE 

RESPIRATORY FAILURE WITH HYPERCAPNIA   





(4) Aneurysm of right popliteal artery


Code(s): I72.4 - ANEURYSM OF ARTERY OF LOWER EXTREMITY   





(5) COPD with acute exacerbation


Code(s): J44.1 - CHRONIC OBSTRUCTIVE PULMONARY DISEASE W (ACUTE) EXACERBATION   





(6) Chronic respiratory acidosis


Code(s): E87.2 - ACIDOSIS   





(7) Leg edema, right


Code(s): R60.0 - LOCALIZED EDEMA   





(8) Right ventricular systolic dysfunction


Code(s): I51.9 - HEART DISEASE, UNSPECIFIED   





(9) Atrial flutter


Code(s): I48.92 - UNSPECIFIED ATRIAL FLUTTER   


Qualifiers: 


   Atrial flutter type: unspecified   Qualified Code(s): I48.92 - Unspecified 

atrial flutter   





(10) COPD (chronic obstructive pulmonary disease)


Code(s): J44.9 - CHRONIC OBSTRUCTIVE PULMONARY DISEASE, UNSPECIFIED   


Qualifiers: 


   COPD type: COPD with acute exacerbation   Qualified Code(s): J44.1 - Chronic 

obstructive pulmonary disease with (acute) exacerbation   





(11) Chronic cor pulmonale


Code(s): I27.81 - COR PULMONALE (CHRONIC)   





(12) Dementia


Code(s): F03.90 - UNSPECIFIED DEMENTIA WITHOUT BEHAVIORAL DISTURBANCE   


Qualifiers: 


   Dementia type: unspecified type   Dementia behavioral disturbance: without 

behavioral disturbance   Qualified Code(s): F03.90 - Unspecified dementia 

without behavioral disturbance   





(13) Hyperlipidemia


Code(s): E78.5 - HYPERLIPIDEMIA, UNSPECIFIED   


Qualifiers: 


   Hyperlipidemia type: unspecified   Qualified Code(s): E78.5 - Hyperlipidemia

, unspecified   





(14) Hypertension


Code(s): I10 - ESSENTIAL (PRIMARY) HYPERTENSION   


Qualifiers: 


   Hypertension type: essential hypertension   Qualified Code(s): I10 - 

Essential (primary) hypertension   





(15) Hypothyroidism


Code(s): E03.9 - HYPOTHYROIDISM, UNSPECIFIED   


Qualifiers: 


   Hypothyroidism type: unspecified   Qualified Code(s): E03.9 - Hypothyroidism

, unspecified   





(16) Obstructive sleep apnea


Code(s): G47.33 - OBSTRUCTIVE SLEEP APNEA (ADULT) (PEDIATRIC)   





(17) Pulmonary hypertension


Code(s): I27.2 - OTHER SECONDARY PULMONARY HYPERTENSION * DO NOT USE *

## 2019-04-02 NOTE — PN
Progress Note (short form)





- Note


Progress Note: 





Vascular Surgery 





CTA reviewed. 


Pt with 3cm right popliteal artery aneurysm. 


Left poplteal artery aneurysm is 1.5cm. 


Pt will need endovascular covered stent placement of 


right popliteal artery aneurysm.


Once cleared from a pulmonary and cardiology perspective, will proceed. 





Justus Taylor DO

## 2019-04-02 NOTE — PN
Progress Note (short form)





- Note


Progress Note: 





85 yo male with right popliteal artery aneurysm (2.3 cm)


There is a 20-30% chance that there could be an associated AAA


Patient had CTA Aortogram w/ bret LE runoff 4/1/119 but hasn't been read yet.


Vascular Surgery to cont following.

## 2019-04-02 NOTE — PN
Progress Note (short form)





- Note


Progress Note: 


s: no chest pain, palps. sob and edema of RLE stable


 Current Medications





Albuterol Sulfate (Ventolin 0.083% Nebulizer Soln -)  1 amp NEB Q6H PRN


   PRN Reason: SHORT OF BREATH/WHEEZING


Albuterol/Ipratropium (Duoneb -)  1 amp NEB RQID [facility]


   Last Admin: 04/02/19 09:21 Dose:  1 amp


Apixaban (Eliquis -)  5 mg PO BID [facility]


   Last Admin: 04/01/19 21:41 Dose:  5 mg


Ascorbic Acid (Vitamin C -)  500 mg PO DAILY [facility]


   Last Admin: 04/01/19 11:17 Dose:  500 mg


Atorvastatin Calcium (Lipitor -)  10 mg PO HS [facility]


   Last Admin: 04/01/19 21:41 Dose:  10 mg


Carbamazepine (Tegretol -)  200 mg PO HS [facility]


   Last Admin: 04/01/19 22:39 Dose:  200 mg


Digoxin (Lanoxin -)  0.125 mg PO DAILY [facility]


   Last Admin: 04/01/19 11:17 Dose:  0.125 mg


Docusate Sodium (Colace -)  100 mg PO Q12H PRN


   PRN Reason: CONSTIPATION


Donepezil HCl (Aricept -)  10 mg PO HS [facility]


   Last Admin: 04/01/19 21:42 Dose:  10 mg


Furosemide (Lasix Injection -)  80 mg IVPUSH BIDLASIX [facility]


   Last Admin: 04/02/19 06:53 Dose:  80 mg


Doxycycline Hyclate 100 mg/ (Dextrose)  100 mls @ 50 mls/hr IVPB BID [facility]


   Last Admin: 04/01/19 22:39 Dose:  50 mls/hr


Ceftriaxone Sodium 0.5 gm/ (Dextrose)  50 mls @ 100 mls/hr IVPB DAILY [facility]; 

Protocol


   Last Admin: 04/01/19 11:20 Dose:  100 mls/hr


Levothyroxine Sodium (Synthroid -)  50 mcg PO DAILY@0700 [facility]


   Last Admin: 04/02/19 06:53 Dose:  50 mcg


Non-Formulary Medication (Memantine Hcl [Namenda Xr])  14 mg PO AM [facility]


Omega-3-Acid Ethyl Esters (Lovaza -)  1 gm PO BID [facility]


   Last Admin: 04/01/19 21:41 Dose:  1 gm


Potassium Chloride (K-Dur -)  20 meq PO ONCE ONE


   Stop: 04/02/19 13:59


Prednisone (Deltasone -)  40 mg PO DAILY [facility]


   Last Admin: 04/01/19 11:17 Dose:  40 mg


Senna (Senna -)  2 tab PO HS PRN


   PRN Reason: CONSTIPATION

















PE: 


  Vital Signs











 Period  Temp  Pulse  Resp  BP Sys/Gonsales  Pulse Ox


 


 Last 24 Hr  97.6 F-98.0 F    18-20  104-149/58-97  89-94

















NAD, +JVD


Irregular rate and rhythm. Nl S1, S2. 2/6 murmur at LSB


exp wheezes scattered


aaox 3


RLE 3+ pitting edema to knee


+BS, soft, NT, distended. 


Pos dp/PT


No jaundice, diaphoresis. 





 


Echo 2/18/16: Nl LV. RV mild-mod dilation with mild-mod systolic dysfunction.  

Severe pHTN (RVSP 62), mild TR. Mild Ao dilation.  





Pharm nuc stress 2/18/16: Afib with RVR. Asx. No ischemic changes. Nl perf. Nl 

EF. 





CXR: no congestion





CT chest: extensive COPD changes





EKG: sinus, PACs, afib





RLE ultrasound, no DVT, R popliteal artery aneurysm





echo 4/2019 low nl LV function, mildly reduced RV function, mildly dilated RA, 

mild MAC, mod TR, PASP at least 70 mmHg, mild AR, borderline aortic root 

dilation





tele: sinus





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 

on coronary calcifications on chest CT, stable aortic dilation of the ascending 

and abdominal aorta, HTN/HL, remote history of DVT, afib on eliquis p/w SOB, 

cough.





SOB, cough, COPD


- CT with significant findings for COPD, receiving nebs and steroids per primary

, pulm


- also received IV lasix for CHF, missed torsemide doses





hx R heart failure, pulm HTN


- BNP >11,000 


- echo shows severe pulm HTN, low normal RV function


- received lasix 40 mg IV x 1, now on 80 mg IV BID - has abd distension, which 

he has had in the past with volume overload


- weight downtrending


- cont IV lasix, monitor Cr, lytes, daily standing weights





Atrial fibrillation. 


- cont eliquis, digoxin





popliteal artery aneurysm


- vascular consulted, Dr. Taylor


- planned endovascular covered stent placement of R popliteal artery aneurysm, 

will optimize volume status prior





HL: 


- cont statin

## 2019-04-02 NOTE — PN
Physical Exam: 


SUBJECTIVE: Patient seen and examined


-CTA completed: 3cm right popliteal artery aneurysm and Left poplteal artery 

aneurysm is 1.5cm. 


-Planned covered stent of right popliteal artery aneurysm


-pulm status stabilized





OBJECTIVE:





 Vital Signs











 Period  Temp  Pulse  Resp  BP Sys/Gonsales  Pulse Ox


 


 Last 24 Hr  97.6 F-98.0 F    18-20  104-149/58-97  89-94











GENERAL: The patient is awake, alert, in no acute distress.


HEAD: Normal with no signs of trauma.


EYES: PERRL, sclera anicteric, conjunctiva clear. 


ORAL: large dry tongue, dry mucous membranes


ENT: nares patent


NECK: Trachea midline, no JVD


LUNGS: coarse BS diminished at the bases. no accessory muscle use. 


HEART: Regular rate and rhythm, S1, S2 


ABDOMEN: Soft, nontender, nondistended, normoactive bowel sounds, no guarding


EXTREMITIES: 2+ pulses, warm, well-perfused, RLE calf edema, venous stasis 

changes


NEUROLOGICAL: AAOX2 Normal speech


PSYCH: Normal mood, normal affect.


SKIN: Warm, dry, normal turgor











 Laboratory Results - last 24 hr











  04/02/19 04/02/19





  05:30 05:30


 


WBC  5.7 


 


RBC  4.45 


 


Hgb  15.0 


 


Hct  45.4 


 


MCV  102.1 H 


 


MCH  33.6 


 


MCHC  32.9 


 


RDW  16.1 H 


 


Plt Count  118 L D 


 


MPV  7.3 L 


 


Sodium   135 L


 


Potassium   3.4 L


 


Chloride   91 L


 


Carbon Dioxide   38 H


 


Anion Gap   7 L


 


BUN   29 H


 


Creatinine   1.4 H


 


Creat Clearance w eGFR   48.40


 


Random Glucose   75


 


Calcium   8.3 L


 


Total Bilirubin   0.7


 


AST   17


 


ALT   12 L


 


Alkaline Phosphatase   59


 


Total Protein   6.6


 


Albumin   3.0 L








Active Medications











Generic Name Dose Route Start Last Admin





  Trade Name Freq  PRN Reason Stop Dose Admin


 


Albuterol Sulfate  1 amp  03/31/19 03:41  





  Ventolin 0.083% Nebulizer Soln -  NEB   





  Q6H PRN   





  SHORT OF BREATH/WHEEZING   





     





     





     


 


Albuterol/Ipratropium  1 amp  03/31/19 16:00  04/02/19 15:57





  Duoneb -  NEB   1 amp





  RQID [facility]   Administration





     





     





     





     


 


Apixaban  5 mg  03/31/19 10:00  04/02/19 10:09





  Eliquis -  PO   5 mg





  BID [facility]   Administration





     





     





     





     


 


Ascorbic Acid  500 mg  03/31/19 10:00  04/02/19 10:10





  Vitamin C -  PO   500 mg





  DAILY [facility]   Administration





     





     





     





     


 


Atorvastatin Calcium  10 mg  03/31/19 22:00  04/01/19 21:41





  Lipitor -  PO   10 mg





  HS [facility]   Administration





     





     





     





     


 


Carbamazepine  200 mg  03/31/19 22:00  04/01/19 22:39





  Tegretol -  PO   200 mg





  HS [facility]   Administration





     





     





     





     


 


Digoxin  0.125 mg  03/31/19 10:00  04/02/19 10:10





  Lanoxin -  PO   0.125 mg





  DAILY [facility]   Administration





     





     





     





     


 


Docusate Sodium  100 mg  04/01/19 19:55  





  Colace -  PO   





  Q12H PRN   





  CONSTIPATION   





     





     





     


 


Donepezil HCl  10 mg  03/31/19 22:00  04/01/19 21:42





  Aricept -  PO   10 mg





  HS [facility]   Administration





     





     





     





     


 


Furosemide  80 mg  03/31/19 14:00  04/02/19 14:09





  Lasix Injection -  IVPUSH   80 mg





  BIDLASIX [facility]   Administration





     





     





     





     


 


Doxycycline Hyclate 100 mg/  100 mls @ 50 mls/hr  03/31/19 10:00  04/02/19 10:08





  Dextrose  IVPB   50 mls/hr





  BID [facility]   Administration





     





     





     





     


 


Ceftriaxone Sodium 0.5 gm/  50 mls @ 100 mls/hr  03/31/19 16:00  04/02/19 10:08





  Dextrose  IVPB   100 mls/hr





  DAILY [facility]   Administration





     





     





  Protocol   





     


 


Levothyroxine Sodium  50 mcg  03/31/19 07:00  04/02/19 06:53





  Synthroid -  PO   50 mcg





  DAILY@0700 [facility]   Administration





     





     





     





     


 


Non-Formulary Medication  14 mg  03/31/19 07:00  





  Memantine Hcl [Namenda Xr]  PO   





  AM [facility]   





     





     





     





     


 


Omega-3-Acid Ethyl Esters  1 gm  03/31/19 10:00  04/02/19 10:10





  Lovaza -  PO   1 gm





  BID [facility]   Administration





     





     





     





     


 


Prednisone  40 mg  04/01/19 10:00  04/02/19 10:09





  Deltasone -  PO   40 mg





  DAILY [facility]   Administration





     





     





     





     


 


Senna  2 tab  04/01/19 19:55  





  Senna -  PO   





  HS PRN   





  CONSTIPATION   





     





     





     











ASSESSMENT/PLAN: 83 y/o man with a PMHx of: COPD 2LNC, Pulm HTN, HLD, Right 

Sided CHF, Atrial Flutter (on Eliquis) Hypothyroidism, Dementia, Depression, 

ANNA admitted with resp insufficiency which has now stabilized. However, he was 

found to have b/l popliteal artery aneursym R> L, with planned stenting by vac 

surgery. 





COPD


-c/w BiPAP


-pulmonary following, recs appreciated


-PRN nebs


-Prednisone 40mg daily


-continue with IVSS doxycycline and ceftriaxone





Aneurysm of right popliteal artery


-vasc surg following, planned covered stent for right popliteal artery aneurysm





CHF


-lasix 80mg IVP BID


-Echo done 4/1: mod TR, EF 50-55%, no pericardial effusion





Afib


-digoxin 0.125mg daily


-eliquis 5mg BID





HLD


-lipitor 10mg qhs


-Lovaza 1gm BID





Dementia


-aricept 10mg qhs


-Namenda XR 14mg daily


-Tegretol 200mg qhs





Hypothyroidism


-synthroid 50mcg daily





PPX:


bowel regimen with senna and colace





DISPO


-Full code


-pts plan of care reviewed with son (HCP) Jovita [phone]





Problem List





- Problems


(1) Aneurysm of right popliteal artery


Code(s): I72.4 - ANEURYSM OF ARTERY OF LOWER EXTREMITY   





(2) COPD with acute exacerbation


Code(s): J44.1 - CHRONIC OBSTRUCTIVE PULMONARY DISEASE W (ACUTE) EXACERBATION   





(3) Dementia


Code(s): F03.90 - UNSPECIFIED DEMENTIA WITHOUT BEHAVIORAL DISTURBANCE   


Qualifiers: 


   Dementia type: unspecified type   Dementia behavioral disturbance: without 

behavioral disturbance   Qualified Code(s): F03.90 - Unspecified dementia 

without behavioral disturbance   





(4) Hyperlipidemia


Code(s): E78.5 - HYPERLIPIDEMIA, UNSPECIFIED   


Qualifiers: 


   Hyperlipidemia type: unspecified   Qualified Code(s): E78.5 - Hyperlipidemia

, unspecified   





(5) Hypertension


Code(s): I10 - ESSENTIAL (PRIMARY) HYPERTENSION   


Qualifiers: 


   Hypertension type: essential hypertension   Qualified Code(s): I10 - 

Essential (primary) hypertension   





(6) Hypothyroidism


Code(s): E03.9 - HYPOTHYROIDISM, UNSPECIFIED   


Qualifiers: 


   Hypothyroidism type: unspecified   Qualified Code(s): E03.9 - Hypothyroidism

, unspecified   





(7) Obstructive sleep apnea


Code(s): G47.33 - OBSTRUCTIVE SLEEP APNEA (ADULT) (PEDIATRIC)   





Visit type





- Emergency Visit


Emergency Visit: Yes


ED Registration Date: 03/30/19


Care time: The patient presented to the Emergency Department on the above date 

and was hospitalized for further evaluation of their emergent condition.





- New Patient


This patient is new to me today: No





- Critical Care


Critical Care patient: No





- Discharge Referral


Referred to [facility] No

## 2019-04-03 LAB
ANION GAP SERPL CALC-SCNC: 6 MMOL/L (ref 8–16)
APTT BLD: 31 SECONDS (ref 25.2–36.5)
BUN SERPL-MCNC: 36 MG/DL (ref 7–18)
CALCIUM SERPL-MCNC: 8.9 MG/DL (ref 8.5–10.1)
CHLORIDE SERPL-SCNC: 89 MMOL/L (ref 98–107)
CO2 SERPL-SCNC: 40 MMOL/L (ref 21–32)
CREAT SERPL-MCNC: 1.5 MG/DL (ref 0.55–1.3)
DEPRECATED RDW RBC AUTO: 15.8 % (ref 11.9–15.9)
GLUCOSE SERPL-MCNC: 87 MG/DL (ref 74–106)
HCT VFR BLD CALC: 49.5 % (ref 35.4–49)
HGB BLD-MCNC: 16.2 GM/DL (ref 11.7–16.9)
INR BLD: 1.28 (ref 0.83–1.09)
MCH RBC QN AUTO: 33.7 PG (ref 25.7–33.7)
MCHC RBC AUTO-ENTMCNC: 32.8 G/DL (ref 32–35.9)
MCV RBC: 103 FL (ref 80–96)
PLATELET # BLD AUTO: 146 K/MM3 (ref 134–434)
PMV BLD: 7.5 FL (ref 7.5–11.1)
POTASSIUM SERPLBLD-SCNC: 3.8 MMOL/L (ref 3.5–5.1)
PT PNL PPP: 15.1 SEC (ref 9.7–13)
RBC # BLD AUTO: 4.8 M/MM3 (ref 4–5.6)
SODIUM SERPL-SCNC: 134 MMOL/L (ref 136–145)
WBC # BLD AUTO: 6.6 K/MM3 (ref 4–10)

## 2019-04-03 RX ADMIN — DOXYCYCLINE SCH MLS/HR: 100 INJECTION, POWDER, LYOPHILIZED, FOR SOLUTION INTRAVENOUS at 10:19

## 2019-04-03 RX ADMIN — METHYLPREDNISOLONE SODIUM SUCCINATE SCH MG: 40 INJECTION, POWDER, FOR SOLUTION INTRAMUSCULAR; INTRAVENOUS at 23:00

## 2019-04-03 RX ADMIN — PREDNISONE SCH MG: 20 TABLET ORAL at 10:20

## 2019-04-03 RX ADMIN — METHYLPREDNISOLONE SODIUM SUCCINATE SCH MG: 40 INJECTION, POWDER, FOR SOLUTION INTRAMUSCULAR; INTRAVENOUS at 15:39

## 2019-04-03 RX ADMIN — APIXABAN SCH MG: 5 TABLET, FILM COATED ORAL at 23:00

## 2019-04-03 RX ADMIN — FUROSEMIDE SCH MG: 10 INJECTION, SOLUTION INTRAVENOUS at 06:17

## 2019-04-03 RX ADMIN — OXYCODONE HYDROCHLORIDE AND ACETAMINOPHEN SCH MG: 500 TABLET ORAL at 10:20

## 2019-04-03 RX ADMIN — IPRATROPIUM BROMIDE AND ALBUTEROL SULFATE SCH AMP: .5; 3 SOLUTION RESPIRATORY (INHALATION) at 21:00

## 2019-04-03 RX ADMIN — IPRATROPIUM BROMIDE AND ALBUTEROL SULFATE SCH AMP: .5; 3 SOLUTION RESPIRATORY (INHALATION) at 08:40

## 2019-04-03 RX ADMIN — LEVOTHYROXINE SODIUM SCH MCG: 50 TABLET ORAL at 06:17

## 2019-04-03 RX ADMIN — DOXYCYCLINE SCH MLS/HR: 100 INJECTION, POWDER, LYOPHILIZED, FOR SOLUTION INTRAVENOUS at 23:01

## 2019-04-03 RX ADMIN — IPRATROPIUM BROMIDE AND ALBUTEROL SULFATE SCH AMP: .5; 3 SOLUTION RESPIRATORY (INHALATION) at 11:34

## 2019-04-03 RX ADMIN — APIXABAN SCH MG: 5 TABLET, FILM COATED ORAL at 10:20

## 2019-04-03 RX ADMIN — IPRATROPIUM BROMIDE AND ALBUTEROL SULFATE SCH AMP: .5; 3 SOLUTION RESPIRATORY (INHALATION) at 16:34

## 2019-04-03 RX ADMIN — ATORVASTATIN CALCIUM SCH MG: 10 TABLET, FILM COATED ORAL at 23:00

## 2019-04-03 RX ADMIN — DIGOXIN SCH MG: 125 TABLET ORAL at 10:20

## 2019-04-03 RX ADMIN — CEFTRIAXONE SCH MLS/HR: 1 INJECTION, POWDER, FOR SOLUTION INTRAMUSCULAR; INTRAVENOUS at 10:19

## 2019-04-03 RX ADMIN — OMEGA-3-ACID ETHYL ESTERS SCH GM: 1 CAPSULE, LIQUID FILLED ORAL at 23:01

## 2019-04-03 RX ADMIN — OMEGA-3-ACID ETHYL ESTERS SCH GM: 1 CAPSULE, LIQUID FILLED ORAL at 10:20

## 2019-04-03 RX ADMIN — DONEPEZIL HYDROCHLORIDE SCH MG: 10 TABLET, FILM COATED ORAL at 23:00

## 2019-04-03 RX ADMIN — FUROSEMIDE SCH MG: 10 INJECTION, SOLUTION INTRAVENOUS at 14:28

## 2019-04-03 NOTE — PN
Progress Note (short form)





- Note


Progress Note: 





Vascular Surgery 





Pt being optimized. 


Will plan on doing angio on friday if pt is stable and cleared. 


Will cont to follow. 





Justus Taylor DO

## 2019-04-03 NOTE — PN
Progress Note, Physician


History of Present Illness: 





pulmonary








alert less dyspneic on nasal cannula,o2 sat 86%





- Current Medication List


Current Medications: 


Active Medications





Albuterol Sulfate (Ventolin 0.083% Nebulizer Soln -)  1 amp NEB Q6H PRN


   PRN Reason: SHORT OF BREATH/WHEEZING


Albuterol/Ipratropium (Duoneb -)  1 amp NEB RQID [facility]


   Last Admin: 04/03/19 08:40 Dose:  1 amp


Apixaban (Eliquis -)  5 mg PO BID [facility]


   Last Admin: 04/03/19 10:20 Dose:  5 mg


Ascorbic Acid (Vitamin C -)  500 mg PO DAILY [facility]


   Last Admin: 04/03/19 10:20 Dose:  500 mg


Atorvastatin Calcium (Lipitor -)  10 mg PO HS [facility]


   Last Admin: 04/02/19 23:17 Dose:  10 mg


Carbamazepine (Tegretol -)  200 mg PO HS [facility]


   Last Admin: 04/02/19 23:17 Dose:  200 mg


Digoxin (Lanoxin -)  0.125 mg PO DAILY [facility]


   Last Admin: 04/03/19 10:20 Dose:  0.125 mg


Docusate Sodium (Colace -)  100 mg PO Q12H PRN


   PRN Reason: CONSTIPATION


Donepezil HCl (Aricept -)  10 mg PO HS [facility]


   Last Admin: 04/02/19 23:18 Dose:  10 mg


Furosemide (Lasix Injection -)  80 mg IVPUSH BIDLASIX [facility]


   Last Admin: 04/03/19 06:17 Dose:  80 mg


Doxycycline Hyclate 100 mg/ (Dextrose)  100 mls @ 50 mls/hr IVPB BID [facility]


   Last Admin: 04/03/19 10:19 Dose:  50 mls/hr


Ceftriaxone Sodium 0.5 gm/ (Dextrose)  50 mls @ 100 mls/hr IVPB DAILY [facility]; 

Protocol


   Last Admin: 04/03/19 10:19 Dose:  100 mls/hr


Levothyroxine Sodium (Synthroid -)  50 mcg PO DAILY@0700 [facility]


   Last Admin: 04/03/19 06:17 Dose:  50 mcg


Non-Formulary Medication (Memantine Hcl [Namenda Xr])  14 mg PO AM [facility]


Omega-3-Acid Ethyl Esters (Lovaza -)  1 gm PO BID [facility]


   Last Admin: 04/03/19 10:20 Dose:  1 gm


Prednisone (Deltasone -)  40 mg PO DAILY [facility]


   Last Admin: 04/03/19 10:20 Dose:  40 mg


Senna (Senna -)  2 tab PO HS PRN


   PRN Reason: CONSTIPATION











- Objective


Vital Signs: 


 Vital Signs











Temperature  97.9 F   04/03/19 05:45


 


Pulse Rate  84   04/03/19 10:20


 


Respiratory Rate  18   04/03/19 05:45


 


Blood Pressure  120/75   04/03/19 05:45


 


O2 Sat by Pulse Oximetry (%)  94 L  04/03/19 08:40











Constitutional: Yes: Well Nourished, Calm


Eyes: Yes: WNL


HENT: Yes: WNL


Neck: Yes: WNL


Cardiovascular: Yes: Pulse Irregular, S1, S2


Respiratory: Yes: Diminished, Rhonchi (few rhonchi)


Gastrointestinal: Yes: Normal Bowel Sounds, Soft


Extremities: Yes: WNL


Edema: No


Labs: 


 CBC, BMP





 04/03/19 07:15 





 04/03/19 07:15 





 INR, PTT











INR  1.28  (0.83-1.09)  H  04/03/19  07:15    














Assessment/Plan





Problem List





- Problems


(1) Community acquired pneumonia


Code(s): J18.9 - PNEUMONIA, UNSPECIFIED ORGANISM   





(2) Acute respiratory acidosis


Code(s): E87.2 - ACIDOSIS   





(3) Acute respiratory failure with hypoxia and hypercarbia


Code(s): J96.01 - ACUTE RESPIRATORY FAILURE WITH HYPOXIA; J96.02 - ACUTE 

RESPIRATORY FAILURE WITH HYPERCAPNIA   





(4) Aneurysm of right popliteal artery


Code(s): I72.4 - ANEURYSM OF ARTERY OF LOWER EXTREMITY   





(5) COPD with acute exacerbation


Code(s): J44.1 - CHRONIC OBSTRUCTIVE PULMONARY DISEASE W (ACUTE) EXACERBATION   





(6) Chronic respiratory acidosis


Code(s): E87.2 - ACIDOSIS   





(7) Leg edema, right


Code(s): R60.0 - LOCALIZED EDEMA   





(8) Right ventricular systolic dysfunction


Code(s): I51.9 - HEART DISEASE, UNSPECIFIED   





(9) Atrial flutter


Code(s): I48.92 - UNSPECIFIED ATRIAL FLUTTER   


Qualifiers: 


   Atrial flutter type: unspecified   Qualified Code(s): I48.92 - Unspecified 

atrial flutter   





(10) COPD (chronic obstructive pulmonary disease)


Code(s): J44.9 - CHRONIC OBSTRUCTIVE PULMONARY DISEASE, UNSPECIFIED   


Qualifiers: 


   COPD type: COPD with acute exacerbation   Qualified Code(s): J44.1 - Chronic 

obstructive pulmonary disease with (acute) exacerbation   





(11) Chronic cor pulmonale


Code(s): I27.81 - COR PULMONALE (CHRONIC)   





(12) Dementia


Code(s): F03.90 - UNSPECIFIED DEMENTIA WITHOUT BEHAVIORAL DISTURBANCE   


Qualifiers: 


   Dementia type: unspecified type   Dementia behavioral disturbance: without 

behavioral disturbance   Qualified Code(s): F03.90 - Unspecified dementia 

without behavioral disturbance   





(13) Hyperlipidemia


Code(s): E78.5 - HYPERLIPIDEMIA, UNSPECIFIED   


Qualifiers: 


   Hyperlipidemia type: unspecified   Qualified Code(s): E78.5 - Hyperlipidemia

, unspecified   





(14) Hypertension


Code(s): I10 - ESSENTIAL (PRIMARY) HYPERTENSION   


Qualifiers: 


   Hypertension type: essential hypertension   Qualified Code(s): I10 - 

Essential (primary) hypertension   





(15) Hypothyroidism


Code(s): E03.9 - HYPOTHYROIDISM, UNSPECIFIED   


Qualifiers: 


   Hypothyroidism type: unspecified   Qualified Code(s): E03.9 - Hypothyroidism

, unspecified   





(16) Obstructive sleep apnea


Code(s): G47.33 - OBSTRUCTIVE SLEEP APNEA (ADULT) (PEDIATRIC)   





(17) Pulmonary hypertension


Code(s): I27.2 - OTHER SECONDARY PULMONARY HYPERTENSION * DO NOT USE *   





Assessment/Plan


RLL PNA 


SEVERE PULMONARY HTN


AFIB


COPD


CHRONIC HYPOXEMIC RESPIRATORY FAILURE


OSAS





 ABX 


Steroids same dose


NC O2 as tolerated 


NIPPV QHS and PRN 


No smoking discussed


AC 


BD TX standing and PRN 


monitor pulse ox








DR EDWARDS











Problem List





- Problems


(1) Community acquired pneumonia


Code(s): J18.9 - PNEUMONIA, UNSPECIFIED ORGANISM   





(2) Acute respiratory acidosis


Code(s): E87.2 - ACIDOSIS   





(3) Acute respiratory failure with hypoxia and hypercarbia


Code(s): J96.01 - ACUTE RESPIRATORY FAILURE WITH HYPOXIA; J96.02 - ACUTE 

RESPIRATORY FAILURE WITH HYPERCAPNIA   





(4) Aneurysm of right popliteal artery


Code(s): I72.4 - ANEURYSM OF ARTERY OF LOWER EXTREMITY   





(5) COPD with acute exacerbation


Code(s): J44.1 - CHRONIC OBSTRUCTIVE PULMONARY DISEASE W (ACUTE) EXACERBATION   





(6) Chronic respiratory acidosis


Code(s): E87.2 - ACIDOSIS   





(7) Leg edema, right


Code(s): R60.0 - LOCALIZED EDEMA   





(8) Right ventricular systolic dysfunction


Code(s): I51.9 - HEART DISEASE, UNSPECIFIED   





(9) Atrial flutter


Code(s): I48.92 - UNSPECIFIED ATRIAL FLUTTER   


Qualifiers: 


   Atrial flutter type: unspecified   Qualified Code(s): I48.92 - Unspecified 

atrial flutter   





(10) COPD (chronic obstructive pulmonary disease)


Code(s): J44.9 - CHRONIC OBSTRUCTIVE PULMONARY DISEASE, UNSPECIFIED   


Qualifiers: 


   COPD type: COPD with acute exacerbation   Qualified Code(s): J44.1 - Chronic 

obstructive pulmonary disease with (acute) exacerbation   





(11) Chronic cor pulmonale


Code(s): I27.81 - COR PULMONALE (CHRONIC)   





(12) Dementia


Code(s): F03.90 - UNSPECIFIED DEMENTIA WITHOUT BEHAVIORAL DISTURBANCE   


Qualifiers: 


   Dementia type: unspecified type   Dementia behavioral disturbance: without 

behavioral disturbance   Qualified Code(s): F03.90 - Unspecified dementia 

without behavioral disturbance   





(13) Hyperlipidemia


Code(s): E78.5 - HYPERLIPIDEMIA, UNSPECIFIED   


Qualifiers: 


   Hyperlipidemia type: unspecified   Qualified Code(s): E78.5 - Hyperlipidemia

, unspecified   





(14) Hypertension


Code(s): I10 - ESSENTIAL (PRIMARY) HYPERTENSION   


Qualifiers: 


   Hypertension type: essential hypertension   Qualified Code(s): I10 - 

Essential (primary) hypertension   





(15) Hypothyroidism


Code(s): E03.9 - HYPOTHYROIDISM, UNSPECIFIED   


Qualifiers: 


   Hypothyroidism type: unspecified   Qualified Code(s): E03.9 - Hypothyroidism

, unspecified   





(16) Obstructive sleep apnea


Code(s): G47.33 - OBSTRUCTIVE SLEEP APNEA (ADULT) (PEDIATRIC)   





(17) Pulmonary hypertension


Code(s): I27.2 - OTHER SECONDARY PULMONARY HYPERTENSION * DO NOT USE *

## 2019-04-03 NOTE — PN
Progress Note (short form)





- Note


Progress Note: 


s: no chest pain, palps. stable sob, on bipap


  Current Medications





Albuterol Sulfate (Ventolin 0.083% Nebulizer Soln -)  1 amp NEB Q6H PRN


   PRN Reason: SHORT OF BREATH/WHEEZING


Albuterol/Ipratropium (Duoneb -)  1 amp NEB RQID [facility]


   Last Admin: 04/03/19 08:40 Dose:  1 amp


Apixaban (Eliquis -)  5 mg PO BID [facility]


   Last Admin: 04/02/19 23:17 Dose:  5 mg


Ascorbic Acid (Vitamin C -)  500 mg PO DAILY [facility]


   Last Admin: 04/02/19 10:10 Dose:  500 mg


Atorvastatin Calcium (Lipitor -)  10 mg PO HS [facility]


   Last Admin: 04/02/19 23:17 Dose:  10 mg


Carbamazepine (Tegretol -)  200 mg PO HS [facility]


   Last Admin: 04/02/19 23:17 Dose:  200 mg


Digoxin (Lanoxin -)  0.125 mg PO DAILY [facility]


   Last Admin: 04/02/19 10:10 Dose:  0.125 mg


Docusate Sodium (Colace -)  100 mg PO Q12H PRN


   PRN Reason: CONSTIPATION


Donepezil HCl (Aricept -)  10 mg PO HS [facility]


   Last Admin: 04/02/19 23:18 Dose:  10 mg


Furosemide (Lasix Injection -)  80 mg IVPUSH BIDLASIX [facility]


   Last Admin: 04/03/19 06:17 Dose:  80 mg


Doxycycline Hyclate 100 mg/ (Dextrose)  100 mls @ 50 mls/hr IVPB BID [facility]


   Last Admin: 04/02/19 23:17 Dose:  50 mls/hr


Ceftriaxone Sodium 0.5 gm/ (Dextrose)  50 mls @ 100 mls/hr IVPB DAILY [facility]; 

Protocol


   Last Admin: 04/02/19 10:08 Dose:  100 mls/hr


Levothyroxine Sodium (Synthroid -)  50 mcg PO DAILY@0700 [facility]


   Last Admin: 04/03/19 06:17 Dose:  50 mcg


Non-Formulary Medication (Memantine Hcl [Namenda Xr])  14 mg PO AM [facility]


Omega-3-Acid Ethyl Esters (Lovaza -)  1 gm PO BID [facility]


   Last Admin: 04/02/19 23:17 Dose:  1 gm


Prednisone (Deltasone -)  40 mg PO DAILY [facility]


   Last Admin: 04/02/19 10:09 Dose:  40 mg


Senna (Senna -)  2 tab PO HS PRN


   PRN Reason: CONSTIPATION

















PE: 


  


 Vital Signs











 Period  Temp  Pulse  Resp  BP Sys/Gonsales  Pulse Ox


 


 Last 24 Hr  97.7 F-98.3 F  79-99  18-20  111-124/64-76  90-97




















NAD, +JVD


Irregular rate and rhythm. Nl S1, S2. 2/6 murmur at LSB


exp wheezes scattered


aaox 3


RLE 3+ pitting edema to knee


+BS, soft, NT, distended. 


Pos dp/PT


No jaundice, diaphoresis. 





 


Echo 2/18/16: Nl LV. RV mild-mod dilation with mild-mod systolic dysfunction.  

Severe pHTN (RVSP 62), mild TR. Mild Ao dilation.  





Pharm nuc stress 2/18/16: Afib with RVR. Asx. No ischemic changes. Nl perf. Nl 

EF. 





CXR: no congestion





CT chest: extensive COPD changes





EKG: sinus, PACs, afib





RLE ultrasound, no DVT, R popliteal artery aneurysm





echo 4/2019 low nl LV function, mildly reduced RV function, mildly dilated RA, 

mild MAC, mod TR, PASP at least 70 mmHg, mild AR, borderline aortic root 

dilation





tele: sinus, afib rate ok





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 

on coronary calcifications on chest CT, stable aortic dilation of the ascending 

and abdominal aorta, HTN/HL, remote history of DVT, afib on eliquis p/w SOB, 

cough.





SOB, cough, COPD


- CT with significant findings for COPD, receiving nebs and steroids per primary

, pulm


- also received IV lasix for CHF, missed torsemide doses





hx R heart failure, pulm HTN


- BNP >11,000 


- echo shows severe pulm HTN, low normal RV function


- received lasix 40 mg IV x 1, now on 80 mg IV BID - has abd distension, which 

he has had in the past with volume overload


- weight downtrending, Cr uptrending


- cont IV lasix, monitor Cr, lytes, daily standing weights





Atrial fibrillation. 


- cont eliquis, digoxin





popliteal artery aneurysm


- vascular consulted, Dr. Taylor


- planned endovascular covered stent placement of R popliteal artery aneurysm, 

will optimize volume status prior





HL: 


- cont statin

## 2019-04-03 NOTE — PN
Physical Exam: 


SUBJECTIVE: Patient seen and examined at the bedside.  on bipap.  desats with 

nasal cannula. 








OBJECTIVE:


CTA shows 3cm right popliteal artery aneurysm and Left poplteal artery aneurysm 

is 1.5cm. 


more short of breath this morning, stop prednisone po and started on solumedrol

, discussed with pulm.  add protonix


 





 Vital Signs











 Period  Temp  Pulse  Resp  BP Sys/Gonsales  Pulse Ox


 


 Last 24 Hr  97.9 F-98.3 F  74-90  18-24  111-120/66-75  90-97








 GENERAL: The patient is awake, alert, in mild respiratory distress acute 

distress.


HEAD: Normal with no signs of trauma.


EYES: PERRL, sclera anicteric, conjunctiva clear. 


ORAL: large dry tongue, dry mucous membranes


ENT: nares patent


NECK: Trachea midline, no JVD


LUNGS: coarse BS diminished at the bases. no accessory muscle use. 


HEART: Regular rate and rhythm


ABDOMEN: Soft, nontender, nondistended, normoactive bowel sounds, no guarding


EXTREMITIES: 2+ pulses, warm, well-perfused, RLE calf edema, venous stasis 

changes


NEUROLOGICAL: AAOX2 Normal speech


PSYCH: Normal mood, normal affect.


SKIN: Warm, dry, normal turgor





 Laboratory Results - last 24 hr











  04/03/19 04/03/19 04/03/19





  07:15 07:15 07:15


 


WBC  6.6  


 


RBC  4.80  


 


Hgb  16.2  


 


Hct  49.5 H  


 


MCV  103.0 H  


 


MCH  33.7  


 


MCHC  32.8  


 


RDW  15.8  


 


Plt Count  146  D  


 


MPV  7.5  


 


PT with INR   15.10 H 


 


INR   1.28 H 


 


PTT (Actin FS)   31.0 


 


Sodium    134 L


 


Potassium    3.8


 


Chloride    89 L


 


Carbon Dioxide    40 H


 


Anion Gap    6 L


 


BUN    36 H


 


Creatinine    1.5 H


 


Creat Clearance w eGFR    44.69


 


Random Glucose    87


 


Calcium    8.9








Active Medications











Generic Name Dose Route Start Last Admin





  Trade Name Freq  PRN Reason Stop Dose Admin


 


Albuterol Sulfate  1 amp  03/31/19 03:41  





  Ventolin 0.083% Nebulizer Soln -  NEB   





  Q6H PRN   





  SHORT OF BREATH/WHEEZING   





     





     





     


 


Albuterol/Ipratropium  1 amp  03/31/19 16:00  04/03/19 16:34





  Duoneb -  NEB   1 amp





  RQID GARY   Administration





     





     





     





     


 


Apixaban  5 mg  03/31/19 10:00  04/03/19 10:20





  Eliquis -  PO   5 mg





  BID G[facility]   Administration





     





     





     





     


 


Ascorbic Acid  500 mg  03/31/19 10:00  04/03/19 10:20





  Vitamin C -  PO   500 mg





  DAILY [facility]   Administration





     





     





     





     


 


Atorvastatin Calcium  10 mg  03/31/19 22:00  04/02/19 23:17





  Lipitor -  PO   10 mg





  HS GARY   Administration





     





     





     





     


 


Carbamazepine  200 mg  03/31/19 22:00  04/02/19 23:17





  Tegretol -  PO   200 mg





  HS GARY   Administration





     





     





     





     


 


Digoxin  0.125 mg  03/31/19 10:00  04/03/19 10:20





  Lanoxin -  PO   0.125 mg





  DAILY [facility]   Administration





     





     





     





     


 


Docusate Sodium  100 mg  04/01/19 19:55  





  Colace -  PO   





  Q12H PRN   





  CONSTIPATION   





     





     





     


 


Donepezil HCl  10 mg  03/31/19 22:00  04/02/19 23:18





  Aricept -  PO   10 mg





  HS GARY   Administration





     





     





     





     


 


Furosemide  80 mg  03/31/19 14:00  04/03/19 14:28





  Lasix Injection -  IVPUSH   80 mg





  BIDLASIX G[facility]   Administration





     





     





     





     


 


Doxycycline Hyclate 100 mg/  100 mls @ 50 mls/hr  03/31/19 10:00  04/03/19 10:19





  Dextrose  IVPB   50 mls/hr





  BID [facility]   Administration





     





     





     





     


 


Ceftriaxone Sodium 0.5 gm/  50 mls @ 100 mls/hr  03/31/19 16:00  04/03/19 10:19





  Dextrose  IVPB   100 mls/hr





  DAILY [facility]   Administration





     





     





  Protocol   





     


 


Levothyroxine Sodium  50 mcg  03/31/19 07:00  04/03/19 06:17





  Synthroid -  PO   50 mcg





  DAILY@0700 [facility]   Administration





     





     





     





     


 


Methylprednisolone Sodium Succinate  40 mg  04/03/19 15:00  04/03/19 15:39





  Solu-Medrol -  IVPUSH   40 mg





  Q6H-IV [facility]   Administration





     





     





     





     


 


Non-Formulary Medication  14 mg  03/31/19 07:00  





  Memantine Hcl [Namenda Xr]  PO   





  AM GARY   





     





     





     





     


 


Omega-3-Acid Ethyl Esters  1 gm  03/31/19 10:00  04/03/19 10:20





  Lovaza -  PO   1 gm





  BID [facility]   Administration





     





     





     





     


 


Senna  2 tab  04/01/19 19:55  





  Senna -  PO   





  HS PRN   





  CONSTIPATION   





     





     





     











ASSESSMENT/PLAN:





Patient is a 84 year old male with a significant past medical history of  COPD (

home oxygen dependent @ 2 liters), current daily smoker (quit 1 week ago) 

pulmonary hypertension, HLD, right sided CHF, atrial Flutter (on Eliquis) 

hypothyroidism, dementia, depression, ANNA admitted with resp insufficiency and 

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 

vascular surgery. 





Pulmonary


COPD excacerbation, having increased shortness of breath today, desats with 

nasal cannula.  improved on bipap


Started on solumedrol 40mg q6 for shortness of breath, d/c prednisone 40mg daily


on bipap therapy prn and overnight


Duonebs prn


continue with  doxycycline and ceftriaxone


Monitor airway





Vascular:


Aneurysm of right popliteal artery


Vascular surgery following


Planning for stent for right popliteal artery aneurysm





Card:


CHF


On Lasix 80mg IV BID.  monitor daily weights


Echo done 4/1: mod TR, EF 50-55%, no pericardial effusion





Afib


On digoxin 0.125mg daily, eliquis 5mg bid


 


HLD


On lipitor 10mg qhs, lovaza





Psyche:


Dementia


On aricept, namenda, tegretol





Hypothyroidism


On synthroid 50mcg daily





prophy:


bowel regimen with senna and colace





full code





Visit type





- Emergency Visit


Emergency Visit: Yes


ED Registration Date: 03/30/19


Care time: The patient presented to the Emergency Department on the above date 

and was hospitalized for further evaluation of their emergent condition.





- New Patient


This patient is new to me today: Yes


Date on this admission: 04/03/19





- Critical Care


Critical Care patient: No





- Discharge Referral


Referred to [facility] No

## 2019-04-04 LAB
ALBUMIN SERPL-MCNC: 3.2 G/DL (ref 3.4–5)
ALP SERPL-CCNC: 68 U/L (ref 45–117)
ALT SERPL-CCNC: 16 U/L (ref 13–61)
ANION GAP SERPL CALC-SCNC: 11 MMOL/L (ref 8–16)
ANISOCYTOSIS BLD QL: (no result)
AST SERPL-CCNC: 16 U/L (ref 15–37)
BASOPHILS # BLD: 0.2 % (ref 0–2)
BILIRUB SERPL-MCNC: 1.2 MG/DL (ref 0.2–1)
BUN SERPL-MCNC: 48 MG/DL (ref 7–18)
CALCIUM SERPL-MCNC: 8.7 MG/DL (ref 8.5–10.1)
CHLORIDE SERPL-SCNC: 87 MMOL/L (ref 98–107)
CO2 SERPL-SCNC: 36 MMOL/L (ref 21–32)
CREAT SERPL-MCNC: 1.6 MG/DL (ref 0.55–1.3)
DEPRECATED RDW RBC AUTO: 15.6 % (ref 11.9–15.9)
EOSINOPHIL # BLD: 0.1 % (ref 0–4.5)
GLUCOSE SERPL-MCNC: 170 MG/DL (ref 74–106)
HCT VFR BLD CALC: 49.1 % (ref 35.4–49)
HGB BLD-MCNC: 16.2 GM/DL (ref 11.7–16.9)
LYMPHOCYTES # BLD: 2.7 % (ref 8–40)
MACROCYTES BLD QL: (no result)
MAGNESIUM SERPL-MCNC: 2.1 MG/DL (ref 1.8–2.4)
MCH RBC QN AUTO: 34 PG (ref 25.7–33.7)
MCHC RBC AUTO-ENTMCNC: 33 G/DL (ref 32–35.9)
MCV RBC: 103 FL (ref 80–96)
MONOCYTES # BLD AUTO: 3 % (ref 3.8–10.2)
NEUTROPHILS # BLD: 94 % (ref 42.8–82.8)
PLATELET # BLD AUTO: 147 K/MM3 (ref 134–434)
PLATELET BLD QL SMEAR: (no result)
PMV BLD: 7.6 FL (ref 7.5–11.1)
POTASSIUM SERPLBLD-SCNC: 3.9 MMOL/L (ref 3.5–5.1)
PROT SERPL-MCNC: 7.2 G/DL (ref 6.4–8.2)
RBC # BLD AUTO: 4.77 M/MM3 (ref 4–5.6)
SODIUM SERPL-SCNC: 134 MMOL/L (ref 136–145)
WBC # BLD AUTO: 8.1 K/MM3 (ref 4–10)

## 2019-04-04 RX ADMIN — IPRATROPIUM BROMIDE AND ALBUTEROL SULFATE SCH AMP: .5; 3 SOLUTION RESPIRATORY (INHALATION) at 08:30

## 2019-04-04 RX ADMIN — APIXABAN SCH MG: 5 TABLET, FILM COATED ORAL at 21:41

## 2019-04-04 RX ADMIN — IPRATROPIUM BROMIDE AND ALBUTEROL SULFATE SCH AMP: .5; 3 SOLUTION RESPIRATORY (INHALATION) at 20:20

## 2019-04-04 RX ADMIN — DOXYCYCLINE SCH MLS/HR: 100 INJECTION, POWDER, LYOPHILIZED, FOR SOLUTION INTRAVENOUS at 21:41

## 2019-04-04 RX ADMIN — DONEPEZIL HYDROCHLORIDE SCH MG: 10 TABLET, FILM COATED ORAL at 21:47

## 2019-04-04 RX ADMIN — DOXYCYCLINE SCH MLS/HR: 100 INJECTION, POWDER, LYOPHILIZED, FOR SOLUTION INTRAVENOUS at 10:20

## 2019-04-04 RX ADMIN — ATORVASTATIN CALCIUM SCH MG: 10 TABLET, FILM COATED ORAL at 21:41

## 2019-04-04 RX ADMIN — METHYLPREDNISOLONE SODIUM SUCCINATE SCH MG: 40 INJECTION, POWDER, FOR SOLUTION INTRAMUSCULAR; INTRAVENOUS at 21:41

## 2019-04-04 RX ADMIN — FUROSEMIDE SCH MG: 10 INJECTION, SOLUTION INTRAVENOUS at 06:25

## 2019-04-04 RX ADMIN — IPRATROPIUM BROMIDE AND ALBUTEROL SULFATE SCH AMP: .5; 3 SOLUTION RESPIRATORY (INHALATION) at 16:15

## 2019-04-04 RX ADMIN — PANTOPRAZOLE SODIUM SCH MG: 40 TABLET, DELAYED RELEASE ORAL at 10:48

## 2019-04-04 RX ADMIN — OXYCODONE HYDROCHLORIDE AND ACETAMINOPHEN SCH MG: 500 TABLET ORAL at 10:50

## 2019-04-04 RX ADMIN — CEFTRIAXONE SCH MLS/HR: 1 INJECTION, POWDER, FOR SOLUTION INTRAMUSCULAR; INTRAVENOUS at 10:54

## 2019-04-04 RX ADMIN — FUROSEMIDE SCH MG: 10 INJECTION, SOLUTION INTRAVENOUS at 14:35

## 2019-04-04 RX ADMIN — OMEGA-3-ACID ETHYL ESTERS SCH GM: 1 CAPSULE, LIQUID FILLED ORAL at 21:41

## 2019-04-04 RX ADMIN — SENNOSIDES PRN TAB: 8.6 TABLET, FILM COATED ORAL at 21:46

## 2019-04-04 RX ADMIN — OMEGA-3-ACID ETHYL ESTERS SCH GM: 1 CAPSULE, LIQUID FILLED ORAL at 10:48

## 2019-04-04 RX ADMIN — LEVOTHYROXINE SODIUM SCH MCG: 50 TABLET ORAL at 06:25

## 2019-04-04 RX ADMIN — IPRATROPIUM BROMIDE AND ALBUTEROL SULFATE SCH AMP: .5; 3 SOLUTION RESPIRATORY (INHALATION) at 12:10

## 2019-04-04 RX ADMIN — APIXABAN SCH MG: 5 TABLET, FILM COATED ORAL at 10:48

## 2019-04-04 RX ADMIN — METHYLPREDNISOLONE SODIUM SUCCINATE SCH MG: 40 INJECTION, POWDER, FOR SOLUTION INTRAMUSCULAR; INTRAVENOUS at 16:00

## 2019-04-04 RX ADMIN — METHYLPREDNISOLONE SODIUM SUCCINATE SCH MG: 40 INJECTION, POWDER, FOR SOLUTION INTRAMUSCULAR; INTRAVENOUS at 09:40

## 2019-04-04 RX ADMIN — METHYLPREDNISOLONE SODIUM SUCCINATE SCH MG: 40 INJECTION, POWDER, FOR SOLUTION INTRAMUSCULAR; INTRAVENOUS at 04:02

## 2019-04-04 RX ADMIN — DIGOXIN SCH MG: 125 TABLET ORAL at 10:48

## 2019-04-04 NOTE — PN
Progress Note (short form)





- Note


Progress Note: 


Feels a little better today. 


Less cough and SOB.  


Remains mildly tachypneic at rest. 





 Intake & Output











 04/01/19 04/02/19 04/03/19 04/04/19





 23:59 23:59 23:59 23:59


 


Intake Total 420 830 740 100


 


Output Total 5020 1850 2100 


 


Balance -4600 -1020 -1360 100


 


Weight 164 lb 12.8 oz 162 lb 9.6 oz 159 lb 12.8 oz 144 lb











 Last Vital Signs











Temp Pulse Resp BP Pulse Ox


 


 97.8 F   76   20   119/69   94 L


 


 04/04/19 08:17  04/04/19 10:48  04/04/19 08:17  04/04/19 08:17  04/04/19 12:09








Active Medications





Albuterol Sulfate (Ventolin 0.083% Nebulizer Soln -)  1 amp NEB Q6H PRN


   PRN Reason: SHORT OF BREATH/WHEEZING


Albuterol/Ipratropium (Duoneb -)  1 amp NEB RQID [facility]


   Last Admin: 04/04/19 12:10 Dose:  1 amp


Apixaban (Eliquis -)  5 mg PO BID [facility]


   Last Admin: 04/04/19 10:48 Dose:  5 mg


Ascorbic Acid (Vitamin C -)  500 mg PO DAILY [facility]


   Last Admin: 04/04/19 10:50 Dose:  500 mg


Atorvastatin Calcium (Lipitor -)  10 mg PO HS [facility]


   Last Admin: 04/03/19 23:00 Dose:  10 mg


Carbamazepine (Tegretol -)  200 mg PO HS [facility]


   Last Admin: 04/03/19 23:00 Dose:  200 mg


Digoxin (Lanoxin -)  0.125 mg PO DAILY [facility]


   Last Admin: 04/04/19 10:48 Dose:  0.125 mg


Docusate Sodium (Colace -)  100 mg PO Q12H PRN


   PRN Reason: CONSTIPATION


Donepezil HCl (Aricept -)  10 mg PO HS [facility]


   Last Admin: 04/03/19 23:00 Dose:  10 mg


Furosemide (Lasix Injection -)  80 mg IVPUSH BIDLASIX [facility]


   Last Admin: 04/04/19 06:25 Dose:  80 mg


Doxycycline Hyclate 100 mg/ (Dextrose)  100 mls @ 50 mls/hr IVPB BID [facility]


   Last Admin: 04/04/19 10:20 Dose:  50 mls/hr


Ceftriaxone Sodium 0.5 gm/ (Dextrose)  50 mls @ 100 mls/hr IVPB DAILY [facility]; 

Protocol


   Last Admin: 04/04/19 10:54 Dose:  100 mls/hr


Levothyroxine Sodium (Synthroid -)  50 mcg PO DAILY@0700 [facility]


   Last Admin: 04/04/19 06:25 Dose:  50 mcg


Methylprednisolone Sodium Succinate (Solu-Medrol -)  40 mg IVPUSH Q6H-IV [facility]


   Last Admin: 04/04/19 09:40 Dose:  40 mg


Non-Formulary Medication (Memantine Hcl [Namenda Xr])  14 mg PO AM [facility]


Omega-3-Acid Ethyl Esters (Lovaza -)  1 gm PO BID [facility]


   Last Admin: 04/04/19 10:48 Dose:  1 gm


Pantoprazole Sodium (Protonix -)  40 mg PO DAILY [facility]


   Last Admin: 04/04/19 10:48 Dose:  40 mg


Senna (Senna -)  2 tab PO HS PRN


   PRN Reason: CONSTIPATION











Constitutional: Yes: NAD 


Eyes: Yes: Conjunctiva Clear, EOM Intact


HENT: Yes: Atraumatic, Normocephalic


Neck: Yes: Supple, Trachea Midline


Cardiovascular: Yes: Pulse Irregular


Respiratory: Yes: Cough, Diminished, Rhonchi, Tachypnea, Few scattered wheezes.

  No: Accessory Muscle Use, Stridor


...Inspection: Yes: WNL


...Clubbing: No


Gastrointestinal: Yes: Normal Bowel Sounds, Soft


Renal/: Yes: WNL


Musculoskeletal: Yes: WNL


Extremities: Yes: WNL


Edema: Yes


Peripheral Pulses WNL: Yes


Integumentary: Yes: WNL


Neurological: Yes: WNL, Alert, Oriented


...Motor Strength: WNL


Psychiatric: Yes: WNL, Alert, Oriented


Labs: 


 








  Laboratory Results - last 24 hr











  04/04/19 04/04/19





  10:14 10:14


 


WBC  8.1 


 


RBC  4.77 


 


Hgb  16.2 


 


Hct  49.1 H 


 


MCV  103.0 H 


 


MCH  34.0 H 


 


MCHC  33.0 


 


RDW  15.6 


 


Plt Count  147 


 


MPV  7.6 


 


Absolute Neuts (auto)  7.6 


 


Neutrophils %  94.0 H 


 


Neutrophils % (Manual)  95.0 H 


 


Band Neutrophils %  0.0 


 


Lymphocytes %  2.7 L D 


 


Lymphocytes % (Manual)  4.0 L 


 


Monocytes %  3.0 L 


 


Monocytes % (Manual)  1 L 


 


Eosinophils %  0.1 


 


Eosinophils % (Manual)  0.0 


 


Basophils %  0.2 


 


Basophils % (Manual)  0.0 


 


Myelocytes % (Man)  0 


 


Promyelocytes % (Man)  0 


 


Blast Cells % (Manual)  0 


 


Nucleated RBC %  0 


 


Metamyelocytes  0 


 


Hypochromia  0 


 


Platelet Estimate  Decreased 


 


Polychromasia  0 


 


Poikilocytosis  0 


 


Anisocytosis  1+ 


 


Microcytosis  0 


 


Macrocytosis  1+ 


 


Sodium   134 L


 


Potassium   3.9


 


Chloride   87 L


 


Carbon Dioxide   36 H


 


Anion Gap   11


 


BUN   48 H


 


Creatinine   1.6 H


 


Creat Clearance w eGFR   41.49


 


Random Glucose   170 H


 


Calcium   8.7


 


Magnesium   2.1


 


Total Bilirubin   1.2 H


 


AST   16


 


ALT   16


 


Alkaline Phosphatase   68


 


Total Protein   7.2


 


Albumin   3.2 L














Problem List





- Problems


(1) Community acquired pneumonia


Code(s): J18.9 - PNEUMONIA, UNSPECIFIED ORGANISM   





(2) Acute respiratory acidosis


Code(s): E87.2 - ACIDOSIS   





(3) Acute respiratory failure with hypoxia and hypercarbia


Code(s): J96.01 - ACUTE RESPIRATORY FAILURE WITH HYPOXIA; J96.02 - ACUTE 

RESPIRATORY FAILURE WITH HYPERCAPNIA   





(4) Aneurysm of right popliteal artery


Code(s): I72.4 - ANEURYSM OF ARTERY OF LOWER EXTREMITY   





(5) COPD with acute exacerbation


Code(s): J44.1 - CHRONIC OBSTRUCTIVE PULMONARY DISEASE W (ACUTE) EXACERBATION   





(6) Chronic respiratory acidosis


Code(s): E87.2 - ACIDOSIS   





(7) Leg edema, right


Code(s): R60.0 - LOCALIZED EDEMA   





(8) Right ventricular systolic dysfunction


Code(s): I51.9 - HEART DISEASE, UNSPECIFIED   





(9) Atrial flutter


Code(s): I48.92 - UNSPECIFIED ATRIAL FLUTTER   


Qualifiers: 


   Atrial flutter type: unspecified   Qualified Code(s): I48.92 - Unspecified 

atrial flutter   





(10) COPD (chronic obstructive pulmonary disease)


Code(s): J44.9 - CHRONIC OBSTRUCTIVE PULMONARY DISEASE, UNSPECIFIED   


Qualifiers: 


   COPD type: COPD with acute exacerbation   Qualified Code(s): J44.1 - Chronic 

obstructive pulmonary disease with (acute) exacerbation   





(11) Chronic cor pulmonale


Code(s): I27.81 - COR PULMONALE (CHRONIC)   





(12) Dementia


Code(s): F03.90 - UNSPECIFIED DEMENTIA WITHOUT BEHAVIORAL DISTURBANCE   


Qualifiers: 


   Dementia type: unspecified type   Dementia behavioral disturbance: without 

behavioral disturbance   Qualified Code(s): F03.90 - Unspecified dementia 

without behavioral disturbance   





(13) Hyperlipidemia


Code(s): E78.5 - HYPERLIPIDEMIA, UNSPECIFIED   


Qualifiers: 


   Hyperlipidemia type: unspecified   Qualified Code(s): E78.5 - Hyperlipidemia

, unspecified   





(14) Hypertension


Code(s): I10 - ESSENTIAL (PRIMARY) HYPERTENSION   


Qualifiers: 


   Hypertension type: essential hypertension   Qualified Code(s): I10 - 

Essential (primary) hypertension   





(15) Hypothyroidism


Code(s): E03.9 - HYPOTHYROIDISM, UNSPECIFIED   


Qualifiers: 


   Hypothyroidism type: unspecified   Qualified Code(s): E03.9 - Hypothyroidism

, unspecified   





(16) Obstructive sleep apnea


Code(s): G47.33 - OBSTRUCTIVE SLEEP APNEA (ADULT) (PEDIATRIC)   





(17) Pulmonary hypertension


Code(s): I27.2 - OTHER SECONDARY PULMONARY HYPERTENSION * DO NOT USE *   





Assessment/Plan


RLL PNA 





ABX 


NC O2: maintain saturation 88% to 92%  


NIPPV QHS and PRN 


No smoking discussed


AC 


BD TX standing and PRN 


Continuous oxymetry monitoring


Would defer Angiography until Monday.  Patient still has a component of 

bronchospasm and not at his baseline





Dr Flor 











Problem List





- Problems


(1) Community acquired pneumonia


Code(s): J18.9 - PNEUMONIA, UNSPECIFIED ORGANISM   





(2) Acute respiratory acidosis


Code(s): E87.2 - ACIDOSIS   





(3) Acute respiratory failure with hypoxia and hypercarbia


Code(s): J96.01 - ACUTE RESPIRATORY FAILURE WITH HYPOXIA; J96.02 - ACUTE 

RESPIRATORY FAILURE WITH HYPERCAPNIA   





(4) Aneurysm of right popliteal artery


Code(s): I72.4 - ANEURYSM OF ARTERY OF LOWER EXTREMITY   





(5) COPD with acute exacerbation


Code(s): J44.1 - CHRONIC OBSTRUCTIVE PULMONARY DISEASE W (ACUTE) EXACERBATION   





(6) Chronic respiratory acidosis


Code(s): E87.2 - ACIDOSIS   





(7) Leg edema, right


Code(s): R60.0 - LOCALIZED EDEMA   





(8) Right ventricular systolic dysfunction


Code(s): I51.9 - HEART DISEASE, UNSPECIFIED   





(9) Atrial flutter


Code(s): I48.92 - UNSPECIFIED ATRIAL FLUTTER   


Qualifiers: 


   Atrial flutter type: unspecified   Qualified Code(s): I48.92 - Unspecified 

atrial flutter   





(10) COPD (chronic obstructive pulmonary disease)


Code(s): J44.9 - CHRONIC OBSTRUCTIVE PULMONARY DISEASE, UNSPECIFIED   


Qualifiers: 


   COPD type: COPD with acute exacerbation   Qualified Code(s): J44.1 - Chronic 

obstructive pulmonary disease with (acute) exacerbation   





(11) Chronic cor pulmonale


Code(s): I27.81 - COR PULMONALE (CHRONIC)   





(12) Dementia


Code(s): F03.90 - UNSPECIFIED DEMENTIA WITHOUT BEHAVIORAL DISTURBANCE   


Qualifiers: 


   Dementia type: unspecified type   Dementia behavioral disturbance: without 

behavioral disturbance   Qualified Code(s): F03.90 - Unspecified dementia 

without behavioral disturbance   





(13) Hyperlipidemia


Code(s): E78.5 - HYPERLIPIDEMIA, UNSPECIFIED   


Qualifiers: 


   Hyperlipidemia type: unspecified   Qualified Code(s): E78.5 - Hyperlipidemia

, unspecified   





(14) Hypertension


Code(s): I10 - ESSENTIAL (PRIMARY) HYPERTENSION   


Qualifiers: 


   Hypertension type: essential hypertension   Qualified Code(s): I10 - 

Essential (primary) hypertension   





(15) Hypothyroidism


Code(s): E03.9 - HYPOTHYROIDISM, UNSPECIFIED   


Qualifiers: 


   Hypothyroidism type: unspecified   Qualified Code(s): E03.9 - Hypothyroidism

, unspecified   





(16) Obstructive sleep apnea


Code(s): G47.33 - OBSTRUCTIVE SLEEP APNEA (ADULT) (PEDIATRIC)   





(17) Pulmonary hypertension


Code(s): I27.2 - OTHER SECONDARY PULMONARY HYPERTENSION * DO NOT USE *

## 2019-04-04 NOTE — PN
Progress Note (short form)





- Note


Progress Note: 





s: no chest pain, palps dizzy. stable sob


  Current Medications











Generic Name Dose Route Start Last Admin





  Trade Name Freq  PRN Reason Stop Dose Admin


 


Albuterol Sulfate  1 amp  03/31/19 03:41  





  Ventolin 0.083% Nebulizer Soln -  NEB   





  Q6H PRN   





  SHORT OF BREATH/WHEEZING   





     





     





     


 


Albuterol/Ipratropium  1 amp  03/31/19 16:00  04/04/19 08:30





  Duoneb -  NEB   1 amp





  RQID GARY   Administration





     





     





     





     


 


Apixaban  5 mg  03/31/19 10:00  04/04/19 10:48





  Eliquis -  PO   5 mg





  BID GARY   Administration





     





     





     





     


 


Ascorbic Acid  500 mg  03/31/19 10:00  04/04/19 10:50





  Vitamin C -  PO   500 mg





  DAILY GARY   Administration





     





     





     





     


 


Atorvastatin Calcium  10 mg  03/31/19 22:00  04/03/19 23:00





  Lipitor -  PO   10 mg





  HS GARY   Administration





     





     





     





     


 


Carbamazepine  200 mg  03/31/19 22:00  04/03/19 23:00





  Tegretol -  PO   200 mg





  HS GARY   Administration





     





     





     





     


 


Digoxin  0.125 mg  03/31/19 10:00  04/04/19 10:48





  Lanoxin -  PO   0.125 mg





  DAILY GARY   Administration





     





     





     





     


 


Docusate Sodium  100 mg  04/01/19 19:55  





  Colace -  PO   





  Q12H PRN   





  CONSTIPATION   





     





     





     


 


Donepezil HCl  10 mg  03/31/19 22:00  04/03/19 23:00





  Aricept -  PO   10 mg





  HS GARY   Administration





     





     





     





     


 


Furosemide  80 mg  03/31/19 14:00  04/04/19 06:25





  Lasix Injection -  IVPUSH   80 mg





  BIDLASIX GARY   Administration





     





     





     





     


 


Doxycycline Hyclate 100 mg/  100 mls @ 50 mls/hr  03/31/19 10:00  04/03/19 23:01





  Dextrose  IVPB   50 mls/hr





  BID GARY   Administration





     





     





     





     


 


Ceftriaxone Sodium 0.5 gm/  50 mls @ 100 mls/hr  03/31/19 16:00  04/04/19 10:54





  Dextrose  IVPB   100 mls/hr





  DAILY GARY   Administration





     





     





  Protocol   





     


 


Levothyroxine Sodium  50 mcg  03/31/19 07:00  04/04/19 06:25





  Synthroid -  PO   50 mcg





  DAILY@0700 GARY   Administration





     





     





     





     


 


Methylprednisolone Sodium Succinate  40 mg  04/03/19 15:00  04/04/19 09:40





  Solu-Medrol -  IVPUSH   40 mg





  Q6H-IV GARY   Administration





     





     





     





     


 


Non-Formulary Medication  14 mg  03/31/19 07:00  





  Memantine Hcl [Namenda Xr]  PO   





  AM GARY   





     





     





     





     


 


Omega-3-Acid Ethyl Esters  1 gm  03/31/19 10:00  04/04/19 10:48





  Lovaza -  PO   1 gm





  BID GRAY   Administration





     





     





     





     


 


Pantoprazole Sodium  40 mg  04/04/19 10:00  04/04/19 10:48





  Protonix -  PO   40 mg





  DAILY GARY   Administration





     





     





     





     


 


Senna  2 tab  04/01/19 19:55  





  Senna -  PO   





  HS PRN   





  CONSTIPATION   





     





     





     








 Vital Signs











 Period  Temp  Pulse  Resp  BP Sys/Gonsales  Pulse Ox


 


 Last 24 Hr  97.2 F-98.8 F    18-20  113-126/54-76  89-98














NAD, +JVD


Irregular rate and rhythm. Nl S1, S2. 2/6 murmur at LSB


exp wheezes scattered


aaox 3


trace le edema


+BS, soft, NT, distended. 


Pos dp/PT


No jaundice, diaphoresis. 





 


 CBC, BMP





 04/04/19 10:14 





 04/04/19 10:14 








Echo 2/18/16: Nl LV. RV mild-mod dilation with mild-mod systolic dysfunction.  

Severe pHTN (RVSP 62), mild TR. Mild Ao dilation.  





Pharm nuc stress 2/18/16: Afib with RVR. Asx. No ischemic changes. Nl perf. Nl 

EF. 





CXR: no congestion





CT chest: extensive COPD changes





EKG: sinus, PACs, afib





RLE ultrasound, no DVT, R popliteal artery aneurysm





echo 4/2019 low nl LV function, mildly reduced RV function, mildly dilated RA, 

mild MAC, mod TR, PASP at least 70 mmHg, mild AR, borderline aortic root 

dilation





tele: sinus, afib/flutter rate ok








a/p:


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 

on coronary calcifications on chest CT, stable aortic dilation of the ascending 

and abdominal aorta, HTN/HL, remote history of DVT, afib on eliquis p/w SOB, 

cough.





SOB, cough, COPD


- CT with significant findings for COPD, receiving nebs and steroids per primary

, pulm


- also receiving IV lasix for CHF





acute Right heart failure, pulm HTN


- BNP >11,000 


- echo shows severe pulm HTN, low normal RV function


- received lasix 40 mg IV x 1, now on 80 mg IV BID - has abd distension, which 

he has had in the past with volume overload


- weight downtrending


- cont IV lasix, monitor Cr, lytes, daily standing weights. cr up but near his 

prior baseline.





Atrial fibrillation. 


- cont eliquis, digoxin





popliteal artery aneurysm


- vascular consulted, Dr. Taylor


- planned endovascular covered stent placement of R popliteal artery aneurysm, 

will optimize volume status prior





HL: 


- cont statin

## 2019-04-04 NOTE — PN
Physical Exam: 


SUBJECTIVE: Patient seen and examined at the bedside.  tells me he feels better 

than he did yesterday.  denies any chest pain.





OBJECTIVE:


breathing improved compared to yesterday, now on nasal cannula 2 liters with 

oxygen saturations between 88%-90%.  breathing not yet at baseline.  


on solumedrol 40q6


 Vital Signs











 Period  Temp  Pulse  Resp  BP Sys/Gonsales  Pulse Ox


 


 Last 24 Hr  97.2 F-98.8 F    18-24  111-126/54-76  89-98








GENERAL: The patient is awake, alert and in no acute distress.


HEAD: Normal with no signs of trauma.


EYES: PERRL, sclera anicteric, conjunctiva clear. 


ORAL: large dry tongue, dry mucous membranes


ENT: nares patent


NECK: Trachea midline, no JVD


LUNGS: coarse BS diminished at the bases. but improved from yesterday, no 

accessory muscle use. 


HEART: Regular rate and rhythm


ABDOMEN: Soft, nontender, nondistended, normoactive bowel sounds, no guarding


EXTREMITIES: 2+ pulses, warm, well-perfused, RLE calf edema, venous stasis 

changes


NEUROLOGICAL: AAOX2 Normal speech


PSYCH: Normal mood, normal affect.


SKIN: Warm, dry, normal turgor





Active Medications











Generic Name Dose Route Start Last Admin





  Trade Name Freq  PRN Reason Stop Dose Admin


 


Albuterol Sulfate  1 amp  03/31/19 03:41  





  Ventolin 0.083% Nebulizer Soln -  NEB   





  Q6H PRN   





  SHORT OF BREATH/WHEEZING   





     





     





     


 


Albuterol/Ipratropium  1 amp  03/31/19 16:00  04/04/19 08:30





  Duoneb -  NEB   1 amp





  RQID GARY   Administration





     





     





     





     


 


Apixaban  5 mg  03/31/19 10:00  04/03/19 23:00





  Eliquis -  PO   5 mg





  BID GARY   Administration





     





     





     





     


 


Ascorbic Acid  500 mg  03/31/19 10:00  04/03/19 10:20





  Vitamin C -  PO   500 mg





  DAILY GARY   Administration





     





     





     





     


 


Atorvastatin Calcium  10 mg  03/31/19 22:00  04/03/19 23:00





  Lipitor -  PO   10 mg





  HS GARY   Administration





     





     





     





     


 


Carbamazepine  200 mg  03/31/19 22:00  04/03/19 23:00





  Tegretol -  PO   200 mg





  HS GARY   Administration





     





     





     





     


 


Digoxin  0.125 mg  03/31/19 10:00  04/03/19 10:20





  Lanoxin -  PO   0.125 mg





  DAILY GARY   Administration





     





     





     





     


 


Docusate Sodium  100 mg  04/01/19 19:55  





  Colace -  PO   





  Q12H PRN   





  CONSTIPATION   





     





     





     


 


Donepezil HCl  10 mg  03/31/19 22:00  04/03/19 23:00





  Aricept -  PO   10 mg





  HS GARY   Administration





     





     





     





     


 


Furosemide  80 mg  03/31/19 14:00  04/04/19 06:25





  Lasix Injection -  IVPUSH   80 mg





  BIDLASIX GARY   Administration





     





     





     





     


 


Doxycycline Hyclate 100 mg/  100 mls @ 50 mls/hr  03/31/19 10:00  04/03/19 23:01





  Dextrose  IVPB   50 mls/hr





  BID GARY   Administration





     





     





     





     


 


Ceftriaxone Sodium 0.5 gm/  50 mls @ 100 mls/hr  03/31/19 16:00  04/03/19 10:19





  Dextrose  IVPB   100 mls/hr





  DAILY GARY   Administration





     





     





  Protocol   





     


 


Levothyroxine Sodium  50 mcg  03/31/19 07:00  04/04/19 06:25





  Synthroid -  PO   50 mcg





  DAILY@0700 GARY   Administration





     





     





     





     


 


Methylprednisolone Sodium Succinate  40 mg  04/03/19 15:00  04/04/19 04:02





  Solu-Medrol -  IVPUSH   40 mg





  Q6H-IV GARY   Administration





     





     





     





     


 


Non-Formulary Medication  14 mg  03/31/19 07:00  





  Memantine Hcl [Namenda Xr]  PO   





  AM GARY   





     





     





     





     


 


Omega-3-Acid Ethyl Esters  1 gm  03/31/19 10:00  04/03/19 23:01





  Lovaza -  PO   1 gm





  BID GARY   Administration





     





     





     





     


 


Pantoprazole Sodium  40 mg  04/04/19 10:00  





  Protonix -  PO   





  DAILY GARY   





     





     





     





     


 


Senna  2 tab  04/01/19 19:55  





  Senna -  PO   





  HS PRN   





  CONSTIPATION   





     





     





     














ASSESSMENT/PLAN:


Patient is a 84 year old male with a significant past medical history of  COPD (

home oxygen dependent @ 2 liters), current daily smoker (quit 1 week ago) 

pulmonary hypertension, HLD, right sided CHF, atrial Flutter (on Eliquis) 

hypothyroidism, dementia, depression, ANNA admitted with resp insufficiency and 

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 

vascular surgery. 





Pulmonary


COPD exacerbation, overall his breathing has improved since yesterday.  Now 

back on nasal cannula at 2-3 liters (his home baseline) with stable saturations 

between 88-92%. On solumedrol 40mg q6 for shortness of breath. 


Duonebs prn


continue with doxycycline and ceftriaxone


Monitor airway


on nocturnal bipap





Vascular:


Aneurysm of right popliteal artery


Vascular surgery following


Planning for stent for right popliteal artery aneurysm





Card:


CHF


On Lasix 80mg IV BID.  monitor daily weights


Echo done 4/1: mod TR, EF 50-55%, no pericardial effusion





Afib


On digoxin 0.125mg daily, eliquis 5mg bid


 


HLD


On lipitor 10mg qhs, lovaza





Psyche:


Dementia


On aricept, namenda, tegretol





Hypothyroidism


On synthroid 50mcg daily





prophy:


bowel regimen with senna and colace





full code





Visit type





- Emergency Visit


Emergency Visit: Yes


ED Registration Date: 03/30/19


Care time: The patient presented to the Emergency Department on the above date 

and was hospitalized for further evaluation of their emergent condition.





- New Patient


This patient is new to me today: No





- Critical Care


Critical Care patient: No





- Discharge Referral


Referred to [facility] No

## 2019-04-05 LAB
ALBUMIN SERPL-MCNC: 3.2 G/DL (ref 3.4–5)
ALP SERPL-CCNC: 68 U/L (ref 45–117)
ALT SERPL-CCNC: 16 U/L (ref 13–61)
ANION GAP SERPL CALC-SCNC: 9 MMOL/L (ref 8–16)
ANISOCYTOSIS BLD QL: (no result)
AST SERPL-CCNC: 16 U/L (ref 15–37)
BASOPHILS # BLD: 0.2 % (ref 0–2)
BILIRUB SERPL-MCNC: 0.8 MG/DL (ref 0.2–1)
BUN SERPL-MCNC: 63 MG/DL (ref 7–18)
CALCIUM SERPL-MCNC: 9 MG/DL (ref 8.5–10.1)
CHLORIDE SERPL-SCNC: 89 MMOL/L (ref 98–107)
CO2 SERPL-SCNC: 37 MMOL/L (ref 21–32)
CREAT SERPL-MCNC: 1.6 MG/DL (ref 0.55–1.3)
DEPRECATED RDW RBC AUTO: 15.6 % (ref 11.9–15.9)
EOSINOPHIL # BLD: 0 % (ref 0–4.5)
GLUCOSE SERPL-MCNC: 127 MG/DL (ref 74–106)
HCT VFR BLD CALC: 50 % (ref 35.4–49)
HGB BLD-MCNC: 16.5 GM/DL (ref 11.7–16.9)
LYMPHOCYTES # BLD: 2.6 % (ref 8–40)
MACROCYTES BLD QL: (no result)
MAGNESIUM SERPL-MCNC: 2.2 MG/DL (ref 1.8–2.4)
MCH RBC QN AUTO: 34.2 PG (ref 25.7–33.7)
MCHC RBC AUTO-ENTMCNC: 33 G/DL (ref 32–35.9)
MCV RBC: 103.5 FL (ref 80–96)
MONOCYTES # BLD AUTO: 2.2 % (ref 3.8–10.2)
NEUTROPHILS # BLD: 95 % (ref 42.8–82.8)
OVALOCYTES BLD QL SMEAR: (no result)
PLATELET # BLD AUTO: 168 K/MM3 (ref 134–434)
PLATELET BLD QL SMEAR: NORMAL
PMV BLD: 7.7 FL (ref 7.5–11.1)
POTASSIUM SERPLBLD-SCNC: 4.1 MMOL/L (ref 3.5–5.1)
PROT SERPL-MCNC: 7.2 G/DL (ref 6.4–8.2)
RBC # BLD AUTO: 4.83 M/MM3 (ref 4–5.6)
SODIUM SERPL-SCNC: 135 MMOL/L (ref 136–145)
WBC # BLD AUTO: 9.1 K/MM3 (ref 4–10)

## 2019-04-05 RX ADMIN — OXYCODONE HYDROCHLORIDE AND ACETAMINOPHEN SCH MG: 500 TABLET ORAL at 09:47

## 2019-04-05 RX ADMIN — FUROSEMIDE SCH MG: 10 INJECTION, SOLUTION INTRAVENOUS at 06:01

## 2019-04-05 RX ADMIN — OMEGA-3-ACID ETHYL ESTERS SCH GM: 1 CAPSULE, LIQUID FILLED ORAL at 21:54

## 2019-04-05 RX ADMIN — DIGOXIN SCH MG: 125 TABLET ORAL at 09:47

## 2019-04-05 RX ADMIN — APIXABAN SCH MG: 5 TABLET, FILM COATED ORAL at 09:47

## 2019-04-05 RX ADMIN — METHYLPREDNISOLONE SODIUM SUCCINATE SCH MG: 40 INJECTION, POWDER, FOR SOLUTION INTRAMUSCULAR; INTRAVENOUS at 02:20

## 2019-04-05 RX ADMIN — OMEGA-3-ACID ETHYL ESTERS SCH GM: 1 CAPSULE, LIQUID FILLED ORAL at 09:47

## 2019-04-05 RX ADMIN — METHYLPREDNISOLONE SODIUM SUCCINATE SCH MG: 40 INJECTION, POWDER, FOR SOLUTION INTRAMUSCULAR; INTRAVENOUS at 21:54

## 2019-04-05 RX ADMIN — METHYLPREDNISOLONE SODIUM SUCCINATE SCH MG: 40 INJECTION, POWDER, FOR SOLUTION INTRAMUSCULAR; INTRAVENOUS at 15:04

## 2019-04-05 RX ADMIN — DONEPEZIL HYDROCHLORIDE SCH MG: 10 TABLET, FILM COATED ORAL at 21:55

## 2019-04-05 RX ADMIN — DOXYCYCLINE SCH MLS/HR: 100 INJECTION, POWDER, LYOPHILIZED, FOR SOLUTION INTRAVENOUS at 09:49

## 2019-04-05 RX ADMIN — PANTOPRAZOLE SODIUM SCH MG: 40 TABLET, DELAYED RELEASE ORAL at 09:48

## 2019-04-05 RX ADMIN — IPRATROPIUM BROMIDE AND ALBUTEROL SULFATE SCH AMP: .5; 3 SOLUTION RESPIRATORY (INHALATION) at 11:18

## 2019-04-05 RX ADMIN — LEVOTHYROXINE SODIUM SCH MCG: 50 TABLET ORAL at 06:01

## 2019-04-05 RX ADMIN — IPRATROPIUM BROMIDE AND ALBUTEROL SULFATE SCH AMP: .5; 3 SOLUTION RESPIRATORY (INHALATION) at 07:36

## 2019-04-05 RX ADMIN — SENNOSIDES PRN TAB: 8.6 TABLET, FILM COATED ORAL at 21:54

## 2019-04-05 RX ADMIN — ATORVASTATIN CALCIUM SCH MG: 10 TABLET, FILM COATED ORAL at 21:54

## 2019-04-05 RX ADMIN — METHYLPREDNISOLONE SODIUM SUCCINATE SCH MG: 40 INJECTION, POWDER, FOR SOLUTION INTRAMUSCULAR; INTRAVENOUS at 09:47

## 2019-04-05 RX ADMIN — APIXABAN SCH MG: 5 TABLET, FILM COATED ORAL at 21:54

## 2019-04-05 RX ADMIN — FUROSEMIDE SCH MG: 10 INJECTION, SOLUTION INTRAVENOUS at 15:04

## 2019-04-05 RX ADMIN — CEFTRIAXONE SCH MLS/HR: 1 INJECTION, POWDER, FOR SOLUTION INTRAMUSCULAR; INTRAVENOUS at 09:48

## 2019-04-05 NOTE — PN
Progress Note, Physician


History of Present Illness: 





pulmonary





weak,comfortable,-resp distress O2 sat 97% on nasal cannula





- Current Medication List


Current Medications: 


Active Medications





Albuterol Sulfate (Ventolin 0.083% Nebulizer Soln -)  1 amp NEB Q6H PRN


   PRN Reason: SHORT OF BREATH/WHEEZING


Albuterol/Ipratropium (Duoneb -)  1 amp NEB RQID [facility]


   Last Admin: 04/05/19 07:36 Dose:  1 amp


Apixaban (Eliquis -)  5 mg PO BID [facility]


   Last Admin: 04/05/19 09:47 Dose:  5 mg


Ascorbic Acid (Vitamin C -)  500 mg PO DAILY [facility]


   Last Admin: 04/05/19 09:47 Dose:  500 mg


Atorvastatin Calcium (Lipitor -)  10 mg PO HS [facility]


   Last Admin: 04/04/19 21:41 Dose:  10 mg


Carbamazepine (Tegretol -)  200 mg PO HS [facility]


   Last Admin: 04/04/19 21:41 Dose:  200 mg


Digoxin (Lanoxin -)  0.125 mg PO DAILY [facility]


   Last Admin: 04/05/19 09:47 Dose:  0.125 mg


Docusate Sodium (Colace -)  100 mg PO Q12H PRN


   PRN Reason: CONSTIPATION


   Last Admin: 04/04/19 21:46 Dose:  100 mg


Donepezil HCl (Aricept -)  10 mg PO HS [facility]


   Last Admin: 04/04/19 21:47 Dose:  10 mg


Furosemide (Lasix Injection -)  80 mg IVPUSH BIDLASIX [facility]


   Last Admin: 04/05/19 06:01 Dose:  80 mg


Doxycycline Hyclate 100 mg/ (Dextrose)  100 mls @ 50 mls/hr IVPB BID [facility]


   Last Admin: 04/05/19 09:49 Dose:  50 mls/hr


Ceftriaxone Sodium 0.5 gm/ (Dextrose)  50 mls @ 100 mls/hr IVPB DAILY [facility]; 

Protocol


   Last Admin: 04/05/19 09:48 Dose:  100 mls/hr


Levothyroxine Sodium (Synthroid -)  50 mcg PO DAILY@0700 [facility]


   Last Admin: 04/05/19 06:01 Dose:  50 mcg


Melatonin (Melatonin)  5 mg PO HS PRN


   PRN Reason: INSOMNIA


   Last Admin: 04/05/19 00:02 Dose:  5 mg


Methylprednisolone Sodium Succinate (Solu-Medrol -)  40 mg IVPUSH Q6H-IV GARY


   Last Admin: 04/05/19 09:47 Dose:  40 mg


Non-Formulary Medication (Memantine Hcl [Namenda Xr])  14 mg PO AM [facility]


Omega-3-Acid Ethyl Esters (Lovaza -)  1 gm PO BID [facility]


   Last Admin: 04/05/19 09:47 Dose:  1 gm


Pantoprazole Sodium (Protonix -)  40 mg PO DAILY [facility]


   Last Admin: 04/05/19 09:48 Dose:  40 mg


Senna (Senna -)  2 tab PO HS PRN


   PRN Reason: CONSTIPATION


   Last Admin: 04/04/19 21:46 Dose:  2 tab











- Objective


Vital Signs: 


 Vital Signs











Temperature  97.6 F   04/05/19 09:45


 


Pulse Rate  78   04/05/19 09:47


 


Respiratory Rate  22 H  04/05/19 09:45


 


Blood Pressure  119/71   04/05/19 09:45


 


O2 Sat by Pulse Oximetry (%)  97   04/05/19 07:37











Constitutional: Yes: Calm, Thin


Eyes: Yes: WNL


HENT: Yes: WNL


Neck: Yes: WNL


Cardiovascular: Yes: Pulse Irregular, S1, S2


Respiratory: Yes: Rhonchi (FEW RHONCHI)


Gastrointestinal: Yes: Normal Bowel Sounds, Soft


Extremities: Yes: WNL


Edema: Yes


Labs: 


 CBC, BMP





 04/05/19 06:38 





 04/05/19 06:38 





 INR, PTT











INR  1.28  (0.83-1.09)  H  04/03/19  07:15    














Assessment/Plan





Problem List





- Problems


(1) Community acquired pneumonia


Code(s): J18.9 - PNEUMONIA, UNSPECIFIED ORGANISM   





(2) Acute respiratory acidosis


Code(s): E87.2 - ACIDOSIS   





(3) Acute respiratory failure with hypoxia and hypercarbia


Code(s): J96.01 - ACUTE RESPIRATORY FAILURE WITH HYPOXIA; J96.02 - ACUTE 

RESPIRATORY FAILURE WITH HYPERCAPNIA   





(4) Aneurysm of right popliteal artery


Code(s): I72.4 - ANEURYSM OF ARTERY OF LOWER EXTREMITY   





(5) COPD with acute exacerbation


Code(s): J44.1 - CHRONIC OBSTRUCTIVE PULMONARY DISEASE W (ACUTE) EXACERBATION   





(6) Chronic respiratory acidosis


Code(s): E87.2 - ACIDOSIS   





(7) Leg edema, right


Code(s): R60.0 - LOCALIZED EDEMA   





(8) Right ventricular systolic dysfunction


Code(s): I51.9 - HEART DISEASE, UNSPECIFIED   





(9) Atrial flutter


Code(s): I48.92 - UNSPECIFIED ATRIAL FLUTTER   


Qualifiers: 


   Atrial flutter type: unspecified   Qualified Code(s): I48.92 - Unspecified 

atrial flutter   





(10) COPD (chronic obstructive pulmonary disease)


Code(s): J44.9 - CHRONIC OBSTRUCTIVE PULMONARY DISEASE, UNSPECIFIED   


Qualifiers: 


   COPD type: COPD with acute exacerbation   Qualified Code(s): J44.1 - Chronic 

obstructive pulmonary disease with (acute) exacerbation   





(11) Chronic cor pulmonale


Code(s): I27.81 - COR PULMONALE (CHRONIC)   





(12) Dementia


Code(s): F03.90 - UNSPECIFIED DEMENTIA WITHOUT BEHAVIORAL DISTURBANCE   


Qualifiers: 


   Dementia type: unspecified type   Dementia behavioral disturbance: without 

behavioral disturbance   Qualified Code(s): F03.90 - Unspecified dementia 

without behavioral disturbance   





(13) Hyperlipidemia


Code(s): E78.5 - HYPERLIPIDEMIA, UNSPECIFIED   


Qualifiers: 


   Hyperlipidemia type: unspecified   Qualified Code(s): E78.5 - Hyperlipidemia

, unspecified   





(14) Hypertension


Code(s): I10 - ESSENTIAL (PRIMARY) HYPERTENSION   


Qualifiers: 


   Hypertension type: essential hypertension   Qualified Code(s): I10 - 

Essential (primary) hypertension   





(15) Hypothyroidism


Code(s): E03.9 - HYPOTHYROIDISM, UNSPECIFIED   


Qualifiers: 


   Hypothyroidism type: unspecified   Qualified Code(s): E03.9 - Hypothyroidism

, unspecified   





(16) Obstructive sleep apnea


Code(s): G47.33 - OBSTRUCTIVE SLEEP APNEA (ADULT) (PEDIATRIC)   





(17) Pulmonary hypertension


Code(s): I27.2 - OTHER SECONDARY PULMONARY HYPERTENSION * DO NOT USE *   





Assessment/Plan


RLL PNA 


SEVERE PULMONARY HTN


AFIB


COPD


CHRONIC HYPOXEMIC RESPIRATORY FAILURE


OSAS





 ABX 


Steroids 


NC O2 as tolerated 


lasix


NIPPV QHS and PRN 


No smoking discussed


AC 


BD TX standing and PRN 


monitor pulse ox








DR EDWARDS











Problem List





- Problems


(1) Community acquired pneumonia


Code(s): J18.9 - PNEUMONIA, UNSPECIFIED ORGANISM   





(2) Acute respiratory acidosis


Code(s): E87.2 - ACIDOSIS   





(3) Acute respiratory failure with hypoxia and hypercarbia


Code(s): J96.01 - ACUTE RESPIRATORY FAILURE WITH HYPOXIA; J96.02 - ACUTE 

RESPIRATORY FAILURE WITH HYPERCAPNIA   





(4) Aneurysm of right popliteal artery


Code(s): I72.4 - ANEURYSM OF ARTERY OF LOWER EXTREMITY   





(5) COPD with acute exacerbation


Code(s): J44.1 - CHRONIC OBSTRUCTIVE PULMONARY DISEASE W (ACUTE) EXACERBATION   





(6) Chronic respiratory acidosis


Code(s): E87.2 - ACIDOSIS   





(7) Leg edema, right


Code(s): R60.0 - LOCALIZED EDEMA   





(8) Right ventricular systolic dysfunction


Code(s): I51.9 - HEART DISEASE, UNSPECIFIED   





(9) Atrial flutter


Code(s): I48.92 - UNSPECIFIED ATRIAL FLUTTER   


Qualifiers: 


   Atrial flutter type: unspecified   Qualified Code(s): I48.92 - Unspecified 

atrial flutter   





(10) COPD (chronic obstructive pulmonary disease)


Code(s): J44.9 - CHRONIC OBSTRUCTIVE PULMONARY DISEASE, UNSPECIFIED   


Qualifiers: 


   COPD type: COPD with acute exacerbation   Qualified Code(s): J44.1 - Chronic 

obstructive pulmonary disease with (acute) exacerbation   





(11) Chronic cor pulmonale


Code(s): I27.81 - COR PULMONALE (CHRONIC)   





(12) Dementia


Code(s): F03.90 - UNSPECIFIED DEMENTIA WITHOUT BEHAVIORAL DISTURBANCE   


Qualifiers: 


   Dementia type: unspecified type   Dementia behavioral disturbance: without 

behavioral disturbance   Qualified Code(s): F03.90 - Unspecified dementia 

without behavioral disturbance   





(13) Hyperlipidemia


Code(s): E78.5 - HYPERLIPIDEMIA, UNSPECIFIED   


Qualifiers: 


   Hyperlipidemia type: unspecified   Qualified Code(s): E78.5 - Hyperlipidemia

, unspecified   





(14) Hypertension


Code(s): I10 - ESSENTIAL (PRIMARY) HYPERTENSION   


Qualifiers: 


   Hypertension type: essential hypertension   Qualified Code(s): I10 - 

Essential (primary) hypertension   





(15) Hypothyroidism


Code(s): E03.9 - HYPOTHYROIDISM, UNSPECIFIED   


Qualifiers: 


   Hypothyroidism type: unspecified   Qualified Code(s): E03.9 - Hypothyroidism

, unspecified   





(16) Obstructive sleep apnea


Code(s): G47.33 - OBSTRUCTIVE SLEEP APNEA (ADULT) (PEDIATRIC)   





(17) Pulmonary hypertension


Code(s): I27.2 - OTHER SECONDARY PULMONARY HYPERTENSION * DO NOT USE *

## 2019-04-05 NOTE — PN
Progress Note (short form)





- Note


Progress Note: 





Vascular Surgery 





Pt seen and examined. 


On O2. 


Right popliteal fossa with aneurysm. 3cm on cta. 


Once stable, will cover aneurysm with stent graft. 


Will be on standby





Justus Taylor DO

## 2019-04-05 NOTE — PN
Progress Note (short form)





- Note


Progress Note: 





s: no chest pain, palps dizzy. stable sob


  


 Current Medications











Generic Name Dose Route Start Last Admin





  Trade Name Freq  PRN Reason Stop Dose Admin


 


Albuterol Sulfate  1 amp  03/31/19 03:41  





  Ventolin 0.083% Nebulizer Soln -  NEB   





  Q6H PRN   





  SHORT OF BREATH/WHEEZING   





     





     





     


 


Albuterol/Ipratropium  1 amp  03/31/19 16:00  04/05/19 07:36





  Duoneb -  NEB   1 amp





  RQID GARY   Administration





     





     





     





     


 


Apixaban  5 mg  03/31/19 10:00  04/05/19 09:47





  Eliquis -  PO   5 mg





  BID GARY   Administration





     





     





     





     


 


Ascorbic Acid  500 mg  03/31/19 10:00  04/05/19 09:47





  Vitamin C -  PO   500 mg





  DAILY GARY   Administration





     





     





     





     


 


Atorvastatin Calcium  10 mg  03/31/19 22:00  04/04/19 21:41





  Lipitor -  PO   10 mg





  HS GARY   Administration





     





     





     





     


 


Carbamazepine  200 mg  03/31/19 22:00  04/04/19 21:41





  Tegretol -  PO   200 mg





  HS GARY   Administration





     





     





     





     


 


Digoxin  0.125 mg  03/31/19 10:00  04/05/19 09:47





  Lanoxin -  PO   0.125 mg





  DAILY GARY   Administration





     





     





     





     


 


Docusate Sodium  100 mg  04/01/19 19:55  04/04/19 21:46





  Colace -  PO   100 mg





  Q12H PRN   Administration





  CONSTIPATION   





     





     





     


 


Donepezil HCl  10 mg  03/31/19 22:00  04/04/19 21:47





  Aricept -  PO   10 mg





  HS GARY   Administration





     





     





     





     


 


Furosemide  80 mg  03/31/19 14:00  04/05/19 06:01





  Lasix Injection -  IVPUSH   80 mg





  BIDLASIX GARY   Administration





     





     





     





     


 


Doxycycline Hyclate 100 mg/  100 mls @ 50 mls/hr  03/31/19 10:00  04/05/19 09:49





  Dextrose  IVPB   50 mls/hr





  BID GARY   Administration





     





     





     





     


 


Ceftriaxone Sodium 0.5 gm/  50 mls @ 100 mls/hr  03/31/19 16:00  04/05/19 09:48





  Dextrose  IVPB   100 mls/hr





  DAILY GARY   Administration





     





     





  Protocol   





     


 


Levothyroxine Sodium  50 mcg  03/31/19 07:00  04/05/19 06:01





  Synthroid -  PO   50 mcg





  DAILY@0700 GARY   Administration





     





     





     





     


 


Melatonin  5 mg  04/04/19 23:40  04/05/19 00:02





  Melatonin  PO   5 mg





  HS PRN   Administration





  INSOMNIA   





     





     





     


 


Methylprednisolone Sodium Succinate  40 mg  04/03/19 15:00  04/05/19 09:47





  Solu-Medrol -  IVPUSH   40 mg





  Q6H-IV GARY   Administration





     





     





     





     


 


Non-Formulary Medication  14 mg  03/31/19 07:00  





  Memantine Hcl [Namenda Xr]  PO   





  AM GARY   





     





     





     





     


 


Omega-3-Acid Ethyl Esters  1 gm  03/31/19 10:00  04/05/19 09:47





  Lovaza -  PO   1 gm





  BID GARY   Administration





     





     





     





     


 


Pantoprazole Sodium  40 mg  04/04/19 10:00  04/05/19 09:48





  Protonix -  PO   40 mg





  DAILY GARY   Administration





     





     





     





     


 


Senna  2 tab  04/01/19 19:55  04/04/19 21:46





  Senna -  PO   2 tab





  HS PRN   Administration





  CONSTIPATION   





     





     





     








 Vital Signs











 Period  Temp  Pulse  Resp  BP Sys/Gonsales  Pulse Ox


 


 Last 24 Hr  97.5 F-98.6 F  69-86  16-22  108-142/65-75  93-97











NAD, +JVD


Irregular rate and rhythm. Nl S1, S2. 2/6 murmur at LSB


exp wheezes scattered


aaox 3


trace le edema


+BS, soft, NT, distended. 


Pos dp/PT


No jaundice, diaphoresis. 





 


  CBC, BMP





 04/05/19 06:38 





 04/05/19 06:38 








Echo 2/18/16: Nl LV. RV mild-mod dilation with mild-mod systolic dysfunction.  

Severe pHTN (RVSP 62), mild TR. Mild Ao dilation.  





Pharm nuc stress 2/18/16: Afib with RVR. Asx. No ischemic changes. Nl perf. Nl 

EF. 





CXR: no congestion





CT chest: extensive COPD changes





EKG: sinus, PACs, afib





RLE ultrasound, no DVT, R popliteal artery aneurysm





echo 4/2019 low nl LV function, mildly reduced RV function, mildly dilated RA, 

mild MAC, mod TR, PASP at least 70 mmHg, mild AR, borderline aortic root 

dilation





tele: sinus, afib/flutter rate ok








a/p:


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 

on coronary calcifications on chest CT, stable aortic dilation of the ascending 

and abdominal aorta, HTN/HL, remote history of DVT, afib on eliquis p/w SOB, 

cough.





SOB, cough, COPD


- CT with significant findings for COPD, receiving nebs and steroids per primary

, pulm


- also receiving IV lasix for CHF





acute Right heart failure, pulm HTN


- BNP >11,000 


- echo shows severe pulm HTN, low normal RV function


- received lasix 40 mg IV x 1, now on 80 mg IV BID - has abd distension, which 

he has had in the past with volume overload


- weight downtrending


- cont IV lasix, monitor Cr, lytes, daily standing weights. cr up but near his 

prior baseline.





Atrial fibrillation. 


- cont eliquis, digoxin





popliteal artery aneurysm


- vascular consulted, Dr. Taylor


- planned endovascular covered stent placement of R popliteal artery aneurysm, 

will optimize volume status prior





HL: 


- cont statin

## 2019-04-05 NOTE — PN
Physical Exam: 


SUBJECTIVE: 





Patient seen and examined at the bedside.  Appears slightly more confused 

today.  


Awake and alert, answering questions appropriately but now more forgetful.





OBJECTIVE:





slight confusion this morning, probably secondary to melatonin side effects and/

or solumedrol.


monitor and hold any further night sleep aides


 Vital Signs











 Period  Temp  Pulse  Resp  BP Sys/Gonsales  Pulse Ox


 


 Last 24 Hr  97.5 F-98.6 F  69-86  16-22  108-142/65-75  93-97








GENERAL: The patient is awake, alert and in no acute distress. slight confusion 

this am.


HEAD: Normal with no signs of trauma.


EYES: PERRL, sclera anicteric, conjunctiva clear. 


ORAL: large dry tongue, dry mucous membranes


ENT: nares patent


NECK: Trachea midline, no JVD


LUNGS: lung sounds diminished at the bases. but improved from yesterday, no 

accessory muscle use. 


HEART: Regular rate and rhythm


ABDOMEN: Soft, nontender, nondistended, normoactive bowel sounds, no guarding


EXTREMITIES: 2+ pulses, warm, well-perfused, RLE calf edema, venous stasis 

changes


NEUROLOGICAL: AAOX2 Normal speech


PSYCH: Normal mood, normal affect.


SKIN: Warm, dry, normal turgor








 Laboratory Results - last 24 hr











  04/04/19 04/05/19 04/05/19





  10:14 06:38 06:38


 


WBC   9.1 


 


RBC   4.83 


 


Hgb   16.5 


 


Hct   50.0 H 


 


MCV   103.5 H 


 


MCH   34.2 H 


 


MCHC   33.0 


 


RDW   15.6 


 


Plt Count   168 


 


MPV   7.7 


 


Absolute Neuts (auto)   8.7 H 


 


Neutrophils %   95.0 H 


 


Neutrophils % (Manual)  95.0 H  


 


Band Neutrophils %  0.0  


 


Lymphocytes %   2.6 L 


 


Lymphocytes % (Manual)  4.0 L  


 


Monocytes %   2.2 L 


 


Monocytes % (Manual)  1 L  


 


Eosinophils %   0.0  D 


 


Eosinophils % (Manual)  0.0  


 


Basophils %   0.2 


 


Basophils % (Manual)  0.0  


 


Myelocytes % (Man)  0  


 


Promyelocytes % (Man)  0  


 


Blast Cells % (Manual)  0  


 


Nucleated RBC %   0 


 


Metamyelocytes  0  


 


Hypochromia  0  


 


Platelet Estimate  Decreased  


 


Polychromasia  0  


 


Poikilocytosis  0  


 


Anisocytosis  1+  


 


Microcytosis  0  


 


Macrocytosis  1+  


 


Sodium    135 L


 


Potassium    4.1


 


Chloride    89 L


 


Carbon Dioxide    37 H


 


Anion Gap    9


 


BUN    63 H


 


Creatinine    1.6 H


 


Creat Clearance w eGFR    41.49


 


Random Glucose    127 H


 


Calcium    9.0


 


Magnesium    2.2


 


Total Bilirubin    0.8


 


AST    16


 


ALT    16


 


Alkaline Phosphatase    68


 


Total Protein    7.2


 


Albumin    3.2 L








Active Medications











Generic Name Dose Route Start Last Admin





  Trade Name Freq  PRN Reason Stop Dose Admin


 


Albuterol Sulfate  1 amp  03/31/19 03:41  





  Ventolin 0.083% Nebulizer Soln -  NEB   





  Q6H PRN   





  SHORT OF BREATH/WHEEZING   





     





     





     


 


Albuterol/Ipratropium  1 amp  03/31/19 16:00  04/05/19 11:18





  Duoneb -  NEB   1 amp





  RQID GARY   Administration





     





     





     





     


 


Apixaban  5 mg  03/31/19 10:00  04/05/19 09:47





  Eliquis -  PO   5 mg





  BID GARY   Administration





     





     





     





     


 


Ascorbic Acid  500 mg  03/31/19 10:00  04/05/19 09:47





  Vitamin C -  PO   500 mg





  DAILY GARY   Administration





     





     





     





     


 


Atorvastatin Calcium  10 mg  03/31/19 22:00  04/04/19 21:41





  Lipitor -  PO   10 mg





  HS GARY   Administration





     





     





     





     


 


Carbamazepine  200 mg  03/31/19 22:00  04/04/19 21:41





  Tegretol -  PO   200 mg





  HS GARY   Administration





     





     





     





     


 


Digoxin  0.125 mg  03/31/19 10:00  04/05/19 09:47





  Lanoxin -  PO   0.125 mg





  DAILY GARY   Administration





     





     





     





     


 


Docusate Sodium  100 mg  04/01/19 19:55  04/04/19 21:46





  Colace -  PO   100 mg





  Q12H PRN   Administration





  CONSTIPATION   





     





     





     


 


Donepezil HCl  10 mg  03/31/19 22:00  04/04/19 21:47





  Aricept -  PO   10 mg





  HS GARY   Administration





     





     





     





     


 


Furosemide  80 mg  03/31/19 14:00  04/05/19 06:01





  Lasix Injection -  IVPUSH   80 mg





  BIDLASIX GARY   Administration





     





     





     





     


 


Doxycycline Hyclate 100 mg/  100 mls @ 50 mls/hr  03/31/19 10:00  04/05/19 09:49





  Dextrose  IVPB   50 mls/hr





  BID GARY   Administration





     





     





     





     


 


Ceftriaxone Sodium 0.5 gm/  50 mls @ 100 mls/hr  03/31/19 16:00  04/05/19 09:48





  Dextrose  IVPB   100 mls/hr





  DAILY GARY   Administration





     





     





  Protocol   





     


 


Levothyroxine Sodium  50 mcg  03/31/19 07:00  04/05/19 06:01





  Synthroid -  PO   50 mcg





  DAILY@0700 GARY   Administration





     





     





     





     


 


Melatonin  5 mg  04/04/19 23:40  04/05/19 00:02





  Melatonin  PO   5 mg





  HS PRN   Administration





  INSOMNIA   





     





     





     


 


Methylprednisolone Sodium Succinate  40 mg  04/03/19 15:00  04/05/19 09:47





  Solu-Medrol -  IVPUSH   40 mg





  Q6H-IV GARY   Administration





     





     





     





     


 


Non-Formulary Medication  14 mg  03/31/19 07:00  





  Memantine Hcl [Namenda Xr]  PO   





  AM GARY   





     





     





     





     


 


Omega-3-Acid Ethyl Esters  1 gm  03/31/19 10:00  04/05/19 09:47





  Lovaza -  PO   1 gm





  BID GARY   Administration





     





     





     





     


 


Pantoprazole Sodium  40 mg  04/04/19 10:00  04/05/19 09:48





  Protonix -  PO   40 mg





  DAILY GARY   Administration





     





     





     





     


 


Senna  2 tab  04/01/19 19:55  04/04/19 21:46





  Senna -  PO   2 tab





  HS PRN   Administration





  CONSTIPATION   





     





     





     











ASSESSMENT/PLAN:





Patient is a 84 year old male with a significant past medical history of  COPD (

home oxygen dependent @ 2 liters), current daily smoker (quit 1 week ago) 

pulmonary hypertension, HLD, right sided CHF, atrial Flutter (on Eliquis) 

hypothyroidism, dementia, depression, ANNA admitted with resp insufficiency and 

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 

vascular surgery. 





Pulmonary


COPD exacerbation, overall his breathing has improved.  Now back on nasal 

cannula at 2-3 liters (his home baseline) with stable saturations between 88-92%

. On solumedrol 40mg q6 for shortness of breath. 


Duonebs prn


continue with doxycycline and ceftriaxone


Monitor airway


on nocturnal bipap





Vascular:


Aneurysm of right popliteal artery


Vascular surgery following


Planning for stent for right popliteal artery aneurysm once medically cleared.





Card:


CHF


On Lasix 80mg IV BID.  monitor daily weights


Echo done 4/1: mod TR, EF 50-55%, no pericardial effusion





Afib


On digoxin 0.125mg daily, eliquis 5mg bid


 


HLD


On lipitor 10mg qhs, lovaza





Renal:


JEMAL


creat 1.6, has been higher on previous admission


on lasix bid dosing.


monitor renal function.  if up-trending, consider renal consult





Psyche:


Dementia


On aricept, namenda, tegretol





Hypothyroidism


On synthroid 50mcg daily





prophy:


bowel regimen with senna and colace


on eliquis for afib





fen


tolerating po


monitor electrolytes


low salt











Visit type





- Emergency Visit


Emergency Visit: Yes


ED Registration Date: 03/30/19


Care time: The patient presented to the Emergency Department on the above date 

and was hospitalized for further evaluation of their emergent condition.





- New Patient


This patient is new to me today: No





- Critical Care


Critical Care patient: No





- Discharge Referral


Referred to [facility] No

## 2019-04-06 LAB
ALBUMIN SERPL-MCNC: 3.2 G/DL (ref 3.4–5)
ALP SERPL-CCNC: 64 U/L (ref 45–117)
ALT SERPL-CCNC: 20 U/L (ref 13–61)
ANION GAP SERPL CALC-SCNC: 11 MMOL/L (ref 8–16)
ANISOCYTOSIS BLD QL: (no result)
AST SERPL-CCNC: 19 U/L (ref 15–37)
BASOPHILS # BLD: 0.1 % (ref 0–2)
BILIRUB SERPL-MCNC: 0.9 MG/DL (ref 0.2–1)
BUN SERPL-MCNC: 71 MG/DL (ref 7–18)
CALCIUM SERPL-MCNC: 9.3 MG/DL (ref 8.5–10.1)
CHLORIDE SERPL-SCNC: 87 MMOL/L (ref 98–107)
CO2 SERPL-SCNC: 37 MMOL/L (ref 21–32)
CREAT SERPL-MCNC: 1.7 MG/DL (ref 0.55–1.3)
DEPRECATED RDW RBC AUTO: 15.4 % (ref 11.9–15.9)
EOSINOPHIL # BLD: 0 % (ref 0–4.5)
GLUCOSE SERPL-MCNC: 124 MG/DL (ref 74–106)
HCT VFR BLD CALC: 49.8 % (ref 35.4–49)
HGB BLD-MCNC: 16.3 GM/DL (ref 11.7–16.9)
LYMPHOCYTES # BLD: 3.3 % (ref 8–40)
MACROCYTES BLD QL: (no result)
MCH RBC QN AUTO: 33.7 PG (ref 25.7–33.7)
MCHC RBC AUTO-ENTMCNC: 32.7 G/DL (ref 32–35.9)
MCV RBC: 103.1 FL (ref 80–96)
MONOCYTES # BLD AUTO: 2.6 % (ref 3.8–10.2)
NEUTROPHILS # BLD: 94 % (ref 42.8–82.8)
OVALOCYTES BLD QL SMEAR: (no result)
PLATELET # BLD AUTO: 182 K/MM3 (ref 134–434)
PLATELET BLD QL SMEAR: NORMAL
PMV BLD: 7.8 FL (ref 7.5–11.1)
POTASSIUM SERPLBLD-SCNC: 4.2 MMOL/L (ref 3.5–5.1)
PROT SERPL-MCNC: 7.1 G/DL (ref 6.4–8.2)
RBC # BLD AUTO: 4.83 M/MM3 (ref 4–5.6)
SODIUM SERPL-SCNC: 135 MMOL/L (ref 136–145)
WBC # BLD AUTO: 8.5 K/MM3 (ref 4–10)

## 2019-04-06 RX ADMIN — ATORVASTATIN CALCIUM SCH MG: 10 TABLET, FILM COATED ORAL at 22:50

## 2019-04-06 RX ADMIN — APIXABAN SCH MG: 5 TABLET, FILM COATED ORAL at 09:47

## 2019-04-06 RX ADMIN — DOCUSATE SODIUM SCH MG: 100 CAPSULE, LIQUID FILLED ORAL at 22:50

## 2019-04-06 RX ADMIN — OXYCODONE HYDROCHLORIDE AND ACETAMINOPHEN SCH MG: 500 TABLET ORAL at 09:48

## 2019-04-06 RX ADMIN — PANTOPRAZOLE SODIUM SCH MG: 40 TABLET, DELAYED RELEASE ORAL at 09:47

## 2019-04-06 RX ADMIN — METHYLPREDNISOLONE SODIUM SUCCINATE SCH MG: 40 INJECTION, POWDER, FOR SOLUTION INTRAMUSCULAR; INTRAVENOUS at 17:23

## 2019-04-06 RX ADMIN — SENNOSIDES PRN TAB: 8.6 TABLET, FILM COATED ORAL at 22:50

## 2019-04-06 RX ADMIN — TORSEMIDE SCH MG: 20 TABLET ORAL at 13:16

## 2019-04-06 RX ADMIN — METHYLPREDNISOLONE SODIUM SUCCINATE SCH MG: 40 INJECTION, POWDER, FOR SOLUTION INTRAMUSCULAR; INTRAVENOUS at 09:47

## 2019-04-06 RX ADMIN — OMEGA-3-ACID ETHYL ESTERS SCH GM: 1 CAPSULE, LIQUID FILLED ORAL at 09:47

## 2019-04-06 RX ADMIN — METHYLPREDNISOLONE SODIUM SUCCINATE SCH: 40 INJECTION, POWDER, FOR SOLUTION INTRAMUSCULAR; INTRAVENOUS at 10:31

## 2019-04-06 RX ADMIN — LEVOTHYROXINE SODIUM SCH MCG: 50 TABLET ORAL at 06:02

## 2019-04-06 RX ADMIN — POLYETHYLENE GLYCOL 3350 SCH GRAMS: 17 POWDER, FOR SOLUTION ORAL at 13:16

## 2019-04-06 RX ADMIN — DOCUSATE SODIUM SCH MG: 100 CAPSULE, LIQUID FILLED ORAL at 13:16

## 2019-04-06 RX ADMIN — METHYLPREDNISOLONE SODIUM SUCCINATE SCH MG: 40 INJECTION, POWDER, FOR SOLUTION INTRAMUSCULAR; INTRAVENOUS at 02:04

## 2019-04-06 RX ADMIN — DIGOXIN SCH MG: 125 TABLET ORAL at 09:47

## 2019-04-06 RX ADMIN — ALBUTEROL SULFATE PRN AMP: 2.5 SOLUTION RESPIRATORY (INHALATION) at 19:45

## 2019-04-06 RX ADMIN — CEFTRIAXONE SCH MLS/HR: 1 INJECTION, POWDER, FOR SOLUTION INTRAMUSCULAR; INTRAVENOUS at 09:48

## 2019-04-06 RX ADMIN — DONEPEZIL HYDROCHLORIDE SCH MG: 10 TABLET, FILM COATED ORAL at 23:00

## 2019-04-06 RX ADMIN — OMEGA-3-ACID ETHYL ESTERS SCH GM: 1 CAPSULE, LIQUID FILLED ORAL at 22:49

## 2019-04-06 RX ADMIN — APIXABAN SCH MG: 5 TABLET, FILM COATED ORAL at 22:50

## 2019-04-06 RX ADMIN — FUROSEMIDE SCH MG: 10 INJECTION, SOLUTION INTRAVENOUS at 05:57

## 2019-04-06 NOTE — PN
Progress Note, Physician


History of Present Illness: 





pulmonary





alert,feeling better,less dyspneic on nasal cannula o2 sat 95%





- Current Medication List


Current Medications: 


Active Medications





Albuterol Sulfate (Ventolin 0.083% Nebulizer Soln -)  1 amp NEB Q6H PRN


   PRN Reason: SHORT OF BREATH/WHEEZING


Apixaban (Eliquis -)  5 mg PO BID [facility]


   Last Admin: 04/06/19 09:47 Dose:  5 mg


Ascorbic Acid (Vitamin C -)  500 mg PO DAILY [facility]


   Last Admin: 04/06/19 09:48 Dose:  500 mg


Atorvastatin Calcium (Lipitor -)  10 mg PO HS [facility]


   Last Admin: 04/05/19 21:54 Dose:  10 mg


Carbamazepine (Tegretol -)  200 mg PO HS [facility]


   Last Admin: 04/05/19 22:00 Dose:  200 mg


Digoxin (Lanoxin -)  0.125 mg PO DAILY [facility]


   Last Admin: 04/06/19 09:47 Dose:  0.125 mg


Docusate Sodium (Colace -)  100 mg PO Q12H PRN


   PRN Reason: CONSTIPATION


   Last Admin: 04/04/19 21:46 Dose:  100 mg


Donepezil HCl (Aricept -)  10 mg PO HS [facility]


   Last Admin: 04/05/19 21:55 Dose:  10 mg


Furosemide (Lasix Injection -)  80 mg IVPUSH BIDLASIX [facility]


   Last Admin: 04/06/19 05:57 Dose:  80 mg


Ceftriaxone Sodium 0.5 gm/ (Dextrose)  50 mls @ 100 mls/hr IVPB DAILY [facility]; 

Protocol


   Last Admin: 04/06/19 09:48 Dose:  100 mls/hr


Levothyroxine Sodium (Synthroid -)  50 mcg PO DAILY@0700 [facility]


   Last Admin: 04/06/19 06:02 Dose:  50 mcg


Methylprednisolone Sodium Succinate (Solu-Medrol -)  40 mg IVPUSH Q6H-IV [facility]


   Last Admin: 04/06/19 09:47 Dose:  40 mg


Non-Formulary Medication (Memantine Hcl [Namenda Xr])  14 mg PO AM [facility]


Omega-3-Acid Ethyl Esters (Lovaza -)  1 gm PO BID [facility]


   Last Admin: 04/06/19 09:47 Dose:  1 gm


Pantoprazole Sodium (Protonix -)  40 mg PO DAILY [facility]


   Last Admin: 04/06/19 09:47 Dose:  40 mg


Senna (Senna -)  2 tab PO HS PRN


   PRN Reason: CONSTIPATION


   Last Admin: 04/05/19 21:54 Dose:  2 tab











- Objective


Vital Signs: 


 Vital Signs











Temperature  98 F   04/06/19 06:47


 


Pulse Rate  71   04/06/19 09:47


 


Respiratory Rate  24 H  04/06/19 07:42


 


Blood Pressure  137/71   04/06/19 06:47


 


O2 Sat by Pulse Oximetry (%)  92 L  04/06/19 08:40











Constitutional: Yes: Well Nourished, Calm


Eyes: Yes: WNL


HENT: Yes: WNL


Neck: Yes: WNL


Cardiovascular: Yes: Pulse Irregular, S1, S2


Respiratory: Yes: Rhonchi (few scattered rhonchi and wheezes), Wheezes


Gastrointestinal: Yes: Normal Bowel Sounds, Soft


Extremities: Yes: WNL


Edema: No


Labs: 


 CBC, BMP





 04/06/19 05:30 





 04/06/19 05:30 





 INR, PTT











INR  1.28  (0.83-1.09)  H  04/03/19  07:15    














Assessment/Plan





Problem List





- Problems


(1) Community acquired pneumonia


Code(s): J18.9 - PNEUMONIA, UNSPECIFIED ORGANISM   





(2) Acute respiratory acidosis


Code(s): E87.2 - ACIDOSIS   





(3) Acute respiratory failure with hypoxia and hypercarbia


Code(s): J96.01 - ACUTE RESPIRATORY FAILURE WITH HYPOXIA; J96.02 - ACUTE 

RESPIRATORY FAILURE WITH HYPERCAPNIA   





(4) Aneurysm of right popliteal artery


Code(s): I72.4 - ANEURYSM OF ARTERY OF LOWER EXTREMITY   





(5) COPD with acute exacerbation


Code(s): J44.1 - CHRONIC OBSTRUCTIVE PULMONARY DISEASE W (ACUTE) EXACERBATION   





(6) Chronic respiratory acidosis


Code(s): E87.2 - ACIDOSIS   





(7) Leg edema, right


Code(s): R60.0 - LOCALIZED EDEMA   





(8) Right ventricular systolic dysfunction


Code(s): I51.9 - HEART DISEASE, UNSPECIFIED   





(9) Atrial flutter


Code(s): I48.92 - UNSPECIFIED ATRIAL FLUTTER   


Qualifiers: 


   Atrial flutter type: unspecified   Qualified Code(s): I48.92 - Unspecified 

atrial flutter   





(10) COPD (chronic obstructive pulmonary disease)


Code(s): J44.9 - CHRONIC OBSTRUCTIVE PULMONARY DISEASE, UNSPECIFIED   


Qualifiers: 


   COPD type: COPD with acute exacerbation   Qualified Code(s): J44.1 - Chronic 

obstructive pulmonary disease with (acute) exacerbation   





(11) Chronic cor pulmonale


Code(s): I27.81 - COR PULMONALE (CHRONIC)   





(12) Dementia


Code(s): F03.90 - UNSPECIFIED DEMENTIA WITHOUT BEHAVIORAL DISTURBANCE   


Qualifiers: 


   Dementia type: unspecified type   Dementia behavioral disturbance: without 

behavioral disturbance   Qualified Code(s): F03.90 - Unspecified dementia 

without behavioral disturbance   





(13) Hyperlipidemia


Code(s): E78.5 - HYPERLIPIDEMIA, UNSPECIFIED   


Qualifiers: 


   Hyperlipidemia type: unspecified   Qualified Code(s): E78.5 - Hyperlipidemia

, unspecified   





(14) Hypertension


Code(s): I10 - ESSENTIAL (PRIMARY) HYPERTENSION   


Qualifiers: 


   Hypertension type: essential hypertension   Qualified Code(s): I10 - 

Essential (primary) hypertension   





(15) Hypothyroidism


Code(s): E03.9 - HYPOTHYROIDISM, UNSPECIFIED   


Qualifiers: 


   Hypothyroidism type: unspecified   Qualified Code(s): E03.9 - Hypothyroidism

, unspecified   





(16) Obstructive sleep apnea


Code(s): G47.33 - OBSTRUCTIVE SLEEP APNEA (ADULT) (PEDIATRIC)   





(17) Pulmonary hypertension


Code(s): I27.2 - OTHER SECONDARY PULMONARY HYPERTENSION * DO NOT USE *   





Assessment/Plan


RLL PNA 


SEVERE PULMONARY HTN


AFIB


COPD


CHRONIC HYPOXEMIC RESPIRATORY FAILURE


OSAS





 ABX 


Steroid taper


NC O2 as tolerated 


lasix


NIPPV QHS and PRN 


No smoking discussed


AC 


BD TX standing and PRN 


monitor pulse ox








DR EDWARDS











Problem List





- Problems


(1) Community acquired pneumonia


Code(s): J18.9 - PNEUMONIA, UNSPECIFIED ORGANISM   





(2) Acute respiratory acidosis


Code(s): E87.2 - ACIDOSIS   





(3) Acute respiratory failure with hypoxia and hypercarbia


Code(s): J96.01 - ACUTE RESPIRATORY FAILURE WITH HYPOXIA; J96.02 - ACUTE 

RESPIRATORY FAILURE WITH HYPERCAPNIA   





(4) Aneurysm of right popliteal artery


Code(s): I72.4 - ANEURYSM OF ARTERY OF LOWER EXTREMITY   





(5) COPD with acute exacerbation


Code(s): J44.1 - CHRONIC OBSTRUCTIVE PULMONARY DISEASE W (ACUTE) EXACERBATION   





(6) Chronic respiratory acidosis


Code(s): E87.2 - ACIDOSIS   





(7) Leg edema, right


Code(s): R60.0 - LOCALIZED EDEMA   





(8) Right ventricular systolic dysfunction


Code(s): I51.9 - HEART DISEASE, UNSPECIFIED   





(9) Atrial flutter


Code(s): I48.92 - UNSPECIFIED ATRIAL FLUTTER   


Qualifiers: 


   Atrial flutter type: unspecified   Qualified Code(s): I48.92 - Unspecified 

atrial flutter   





(10) COPD (chronic obstructive pulmonary disease)


Code(s): J44.9 - CHRONIC OBSTRUCTIVE PULMONARY DISEASE, UNSPECIFIED   


Qualifiers: 


   COPD type: COPD with acute exacerbation   Qualified Code(s): J44.1 - Chronic 

obstructive pulmonary disease with (acute) exacerbation   





(11) Chronic cor pulmonale


Code(s): I27.81 - COR PULMONALE (CHRONIC)   





(12) Dementia


Code(s): F03.90 - UNSPECIFIED DEMENTIA WITHOUT BEHAVIORAL DISTURBANCE   


Qualifiers: 


   Dementia type: unspecified type   Dementia behavioral disturbance: without 

behavioral disturbance   Qualified Code(s): F03.90 - Unspecified dementia 

without behavioral disturbance   





(13) Hyperlipidemia


Code(s): E78.5 - HYPERLIPIDEMIA, UNSPECIFIED   


Qualifiers: 


   Hyperlipidemia type: unspecified   Qualified Code(s): E78.5 - Hyperlipidemia

, unspecified   





(14) Hypertension


Code(s): I10 - ESSENTIAL (PRIMARY) HYPERTENSION   


Qualifiers: 


   Hypertension type: essential hypertension   Qualified Code(s): I10 - 

Essential (primary) hypertension   





(15) Hypothyroidism


Code(s): E03.9 - HYPOTHYROIDISM, UNSPECIFIED   


Qualifiers: 


   Hypothyroidism type: unspecified   Qualified Code(s): E03.9 - Hypothyroidism

, unspecified   





(16) Obstructive sleep apnea


Code(s): G47.33 - OBSTRUCTIVE SLEEP APNEA (ADULT) (PEDIATRIC)   





(17) Pulmonary hypertension


Code(s): I27.2 - OTHER SECONDARY PULMONARY HYPERTENSION * DO NOT USE *

## 2019-04-06 NOTE — PN
Physical Exam: 


SUBJECTIVE: Patient seen and examined at the bedside.


more awake and alert today.  sitting in chair.





OBJECTIVE:


 Vital Signs











 Period  Temp  Pulse  Resp  BP Sys/Gonsales  Pulse Ox


 


 Last 24 Hr  96.8 F-98.0 F  51-76  18-24  120-137/62-89  92-97











GENERAL: The patient is awake, alert and in no acute distress. 


HEAD: small open area on anterior head, fresh blood.  pt reports scratching his 

head.  no falls.


EYES: PERRL, sclera anicteric, conjunctiva clear. 


ORAL: dry mucous membranes


ENT: nares patent


NECK: Trachea midline, no JVD


LUNGS: lung sounds diminished at the bases.  no accessory muscle use. 


HEART: Regular rate and rhythm


ABDOMEN: Soft, nontender, nondistended, normoactive bowel sounds, no guarding


EXTREMITIES: 2+ pulses, warm, well-perfused, RLE calf edema, venous stasis 

changes


NEUROLOGICAL: AAOX2 Normal speech


PSYCH: Normal mood, normal affect.


SKIN: Warm, dry, normal turgor








 Laboratory Results - last 24 hr











  04/06/19 04/06/19





  05:30 05:30


 


WBC  8.5 


 


RBC  4.83 


 


Hgb  16.3 


 


Hct  49.8 H 


 


MCV  103.1 H 


 


MCH  33.7 


 


MCHC  32.7 


 


RDW  15.4 


 


Plt Count  182 


 


MPV  7.8 


 


Absolute Neuts (auto)  8.0 


 


Neutrophils %  94.0 H 


 


Neutrophils % (Manual)  92.9 H 


 


Band Neutrophils %  0.0 


 


Lymphocytes %  3.3 L D 


 


Lymphocytes % (Manual)  2.0 L D 


 


Monocytes %  2.6 L 


 


Monocytes % (Manual)  5  D 


 


Eosinophils %  0.0 


 


Eosinophils % (Manual)  0.0 


 


Basophils %  0.1 


 


Basophils % (Manual)  0.0 


 


Myelocytes % (Man)  0 


 


Promyelocytes % (Man)  0 


 


Blast Cells % (Manual)  0 


 


Nucleated RBC %  0 


 


Metamyelocytes  0 


 


Hypochromia  0 


 


Platelet Estimate  Normal 


 


Polychromasia  0 


 


Poikilocytosis  1+ 


 


Anisocytosis  1+ 


 


Microcytosis  0 


 


Macrocytosis  1+ 


 


Ovalocytes  1+ 


 


Sodium   135 L


 


Potassium   4.2


 


Chloride   87 L


 


Carbon Dioxide   37 H


 


Anion Gap   11


 


BUN   71 H


 


Creatinine   1.7 H


 


Creat Clearance w eGFR   38.68


 


Random Glucose   124 H


 


Calcium   9.3


 


Total Bilirubin   0.9


 


AST   19


 


ALT   20


 


Alkaline Phosphatase   64


 


Total Protein   7.1


 


Albumin   3.2 L








Active Medications











Generic Name Dose Route Start Last Admin





  Trade Name Elizabeth  PRN Reason Stop Dose Admin


 


Albuterol Sulfate  1 amp  03/31/19 03:41  





  Ventolin 0.083% Nebulizer Soln -  NEB   





  Q6H PRN   





  SHORT OF BREATH/WHEEZING   





     





     





     


 


Apixaban  5 mg  03/31/19 10:00  04/06/19 09:47





  Eliquis -  PO   5 mg





  BID GARY   Administration





     





     





     





     


 


Ascorbic Acid  500 mg  03/31/19 10:00  04/06/19 09:48





  Vitamin C -  PO   500 mg





  DAILY GARY   Administration





     





     





     





     


 


Atorvastatin Calcium  10 mg  03/31/19 22:00  04/05/19 21:54





  Lipitor -  PO   10 mg





  HS GARY   Administration





     





     





     





     


 


Carbamazepine  200 mg  03/31/19 22:00  04/05/19 22:00





  Tegretol -  PO   200 mg





  HS GARY   Administration





     





     





     





     


 


Digoxin  0.125 mg  03/31/19 10:00  04/06/19 09:47





  Lanoxin -  PO   0.125 mg





  DAILY GARY   Administration





     





     





     





     


 


Docusate Sodium  100 mg  04/06/19 14:00  04/06/19 13:16





  Colace -  PO   100 mg





  TID GARY   Administration





     





     





     





     


 


Donepezil HCl  10 mg  03/31/19 22:00  04/05/19 21:55





  Aricept -  PO   10 mg





  HS GARY   Administration





     





     





     





     


 


Ceftriaxone Sodium 0.5 gm/  50 mls @ 100 mls/hr  03/31/19 16:00  04/06/19 09:48





  Dextrose  IVPB   100 mls/hr





  DAILY GARY   Administration





     





     





  Protocol   





     


 


Levothyroxine Sodium  50 mcg  03/31/19 07:00  04/06/19 06:02





  Synthroid -  PO   50 mcg





  DAILY@0700 GARY   Administration





     





     





     





     


 


Methylprednisolone Sodium Succinate  40 mg  04/06/19 10:00  04/06/19 10:31





  Solu-Medrol -  IVPUSH   Not Given





  Q8H-IV GARY   





     





     





     





     


 


Non-Formulary Medication  14 mg  03/31/19 07:00  





  Memantine Hcl [Namenda Xr]  PO   





  AM GARY   





     





     





     





     


 


Omega-3-Acid Ethyl Esters  1 gm  03/31/19 10:00  04/06/19 09:47





  Lovaza -  PO   1 gm





  BID GARY   Administration





     





     





     





     


 


Pantoprazole Sodium  40 mg  04/04/19 10:00  04/06/19 09:47





  Protonix -  PO   40 mg





  DAILY GARY   Administration





     





     





     





     


 


Polyethylene Glycol  17 gm  04/06/19 13:15  04/06/19 13:16





  Miralax (For Daily Use) -  PO   17 grams





  DAILY GARY   Administration





     





     





     





     


 


Senna  2 tab  04/01/19 19:55  04/05/19 21:54





  Senna -  PO   2 tab





  HS PRN   Administration





  CONSTIPATION   





     





     





     


 


Torsemide  40 mg  04/06/19 14:00  04/06/19 13:16





  Demadex -  PO   40 mg





  BIDLASIX GARY   Administration





     





     





     





     











ASSESSMENT/PLAN:


Patient is a 84 year old male with a significant past medical history of  COPD (

home oxygen dependent @ 2 liters), current daily smoker (quit 1 week ago) 

pulmonary hypertension, HLD, right sided CHF, atrial Flutter (on Eliquis) 

hypothyroidism, dementia, depression, ANNA admitted with resp insufficiency and 

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 

vascular surgery. 





Pulmonary


COPD exacerbation, overall his breathing has improved.  Now back on nasal 

cannula at 2-3 liters (his home baseline) with stable saturations between 88-92%

. On solumedrol 40mg q6 for shortness of breath. 


Duonebs prn


continue with doxycycline and ceftriaxone


Monitor airway


on nocturnal bipap





Vascular:


Aneurysm of right popliteal artery


Vascular surgery following


Planning for stent for right popliteal artery aneurysm once medically cleared.





Card:


CHF


On Lasix 80mg IV BID.  monitor daily weights


Echo done 4/1: mod TR, EF 50-55%, no pericardial effusion





Afib


On digoxin 0.125mg daily, eliquis 5mg bid


 


HLD


On lipitor 10mg qhs, lovaza





Renal:


JEMAL


creat 1.7, has been higher on previous admission


on lasix bid dosing.


monitor renal function.  if up-trending, consider renal consult





Psyche:


Dementia


On aricept, namenda, tegretol





Hypothyroidism


On synthroid 50mcg daily





prophy:


bowel regimen with senna and colace


on eliquis for afib





fen


tolerating po


monitor electrolytes


low salt








Visit type





- Emergency Visit


Emergency Visit: Yes


ED Registration Date: 03/30/19


Care time: The patient presented to the Emergency Department on the above date 

and was hospitalized for further evaluation of their emergent condition.





- New Patient


This patient is new to me today: No





- Critical Care


Critical Care patient: No





- Discharge Referral


Referred to [facility] No

## 2019-04-06 NOTE — PN
Progress Note (short form)





- Note


Progress Note: 





s: no chest pain, palps dizzy. sob less


  


  Current Medications











Generic Name Dose Route Start Last Admin





  Trade Name Freq  PRN Reason Stop Dose Admin


 


Albuterol Sulfate  1 amp  03/31/19 03:41  





  Ventolin 0.083% Nebulizer Soln -  NEB   





  Q6H PRN   





  SHORT OF BREATH/WHEEZING   





     





     





     


 


Apixaban  5 mg  03/31/19 10:00  04/06/19 09:47





  Eliquis -  PO   5 mg





  BID GARY   Administration





     





     





     





     


 


Ascorbic Acid  500 mg  03/31/19 10:00  04/06/19 09:48





  Vitamin C -  PO   500 mg





  DAILY GARY   Administration





     





     





     





     


 


Atorvastatin Calcium  10 mg  03/31/19 22:00  04/05/19 21:54





  Lipitor -  PO   10 mg





  HS GARY   Administration





     





     





     





     


 


Carbamazepine  200 mg  03/31/19 22:00  04/05/19 22:00





  Tegretol -  PO   200 mg





  HS GARY   Administration





     





     





     





     


 


Digoxin  0.125 mg  03/31/19 10:00  04/06/19 09:47





  Lanoxin -  PO   0.125 mg





  DAILY GARY   Administration





     





     





     





     


 


Docusate Sodium  100 mg  04/01/19 19:55  04/04/19 21:46





  Colace -  PO   100 mg





  Q12H PRN   Administration





  CONSTIPATION   





     





     





     


 


Donepezil HCl  10 mg  03/31/19 22:00  04/05/19 21:55





  Aricept -  PO   10 mg





  HS GARY   Administration





     





     





     





     


 


Ceftriaxone Sodium 0.5 gm/  50 mls @ 100 mls/hr  03/31/19 16:00  04/06/19 09:48





  Dextrose  IVPB   100 mls/hr





  DAILY GARY   Administration





     





     





  Protocol   





     


 


Levothyroxine Sodium  50 mcg  03/31/19 07:00  04/06/19 06:02





  Synthroid -  PO   50 mcg





  DAILY@0700 GARY   Administration





     





     





     





     


 


Methylprednisolone Sodium Succinate  40 mg  04/06/19 10:00  04/06/19 10:31





  Solu-Medrol -  IVPUSH   Not Given





  Q8H-IV GARY   





     





     





     





     


 


Non-Formulary Medication  14 mg  03/31/19 07:00  





  Memantine Hcl [Namenda Xr]  PO   





  AM GARY   





     





     





     





     


 


Omega-3-Acid Ethyl Esters  1 gm  03/31/19 10:00  04/06/19 09:47





  Lovaza -  PO   1 gm





  BID GARY   Administration





     





     





     





     


 


Pantoprazole Sodium  40 mg  04/04/19 10:00  04/06/19 09:47





  Protonix -  PO   40 mg





  DAILY GARY   Administration





     





     





     





     


 


Senna  2 tab  04/01/19 19:55  04/05/19 21:54





  Senna -  PO   2 tab





  HS PRN   Administration





  CONSTIPATION   





     





     





     


 


Torsemide  40 mg  04/06/19 14:00  





  Demadex -  PO   





  BIDLASIX GARY   





     





     





     





     








 Vital Signs











 Period  Temp  Pulse  Resp  BP Sys/Gonsales  Pulse Ox


 


 Last 24 Hr  96.8 F-98.0 F  51-74  20-24  121-137/62-89  92-97








NAD, +JVD


Irregular rate and rhythm. Nl S1, S2. 2/6 murmur at LSB


exp wheezes scattered


aaox 3


no le edema


+BS, soft, NT, distended. 


Pos dp/PT


No jaundice, diaphoresis. 





 


  


 CBC, BMP





 04/06/19 05:30 





 04/06/19 05:30 











Echo 2/18/16: Nl LV. RV mild-mod dilation with mild-mod systolic dysfunction.  

Severe pHTN (RVSP 62), mild TR. Mild Ao dilation.  





Pharm nuc stress 2/18/16: Afib with RVR. Asx. No ischemic changes. Nl perf. Nl 

EF. 





CXR: no congestion





CT chest: extensive COPD changes





EKG: sinus, PACs, afib





RLE ultrasound, no DVT, R popliteal artery aneurysm





echo 4/2019 low nl LV function, mildly reduced RV function, mildly dilated RA, 

mild MAC, mod TR, PASP at least 70 mmHg, mild AR, borderline aortic root 

dilation





tele: sinus, afib/flutter rate ok








a/p:


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 

on coronary calcifications on chest CT, stable aortic dilation of the ascending 

and abdominal aorta, HTN/HL, remote history of DVT, afib on eliquis p/w SOB, 

cough.





SOB, cough, COPD


- CT with significant findings for COPD, receiving nebs and steroids per primary

, pulm


- also receiving IV lasix for CHF





acute Right heart failure, pulm HTN


- BNP >11,000 


- echo shows severe pulm HTN, low normal RV function


- received lasix 40 mg IV x 1, now on 80 mg IV BID - has abd distension, which 

he has had in the past with volume overload


4/6: vol stastus improved, cr rising, will change to oral torsemide now. 

monitor Cr, lytes, daily standing weights.





Atrial fibrillation. 


- cont eliquis, digoxin





popliteal artery aneurysm


- vascular consulted, Dr. Taylor


- planned endovascular covered stent placement of R popliteal artery aneurysm, 

will optimize volume status prior





HL: 


- cont statin

## 2019-04-07 LAB
ALBUMIN SERPL-MCNC: 3.2 G/DL (ref 3.4–5)
ALP SERPL-CCNC: 69 U/L (ref 45–117)
ALT SERPL-CCNC: 24 U/L (ref 13–61)
ANION GAP SERPL CALC-SCNC: 9 MMOL/L (ref 8–16)
ANISOCYTOSIS BLD QL: 0
AST SERPL-CCNC: 23 U/L (ref 15–37)
BASOPHILS # BLD: 0.2 % (ref 0–2)
BILIRUB SERPL-MCNC: 1 MG/DL (ref 0.2–1)
BUN SERPL-MCNC: 80 MG/DL (ref 7–18)
CALCIUM SERPL-MCNC: 8.9 MG/DL (ref 8.5–10.1)
CHLORIDE SERPL-SCNC: 89 MMOL/L (ref 98–107)
CO2 SERPL-SCNC: 39 MMOL/L (ref 21–32)
CREAT SERPL-MCNC: 1.8 MG/DL (ref 0.55–1.3)
DEPRECATED RDW RBC AUTO: 15 % (ref 11.9–15.9)
EOSINOPHIL # BLD: 0 % (ref 0–4.5)
GLUCOSE SERPL-MCNC: 221 MG/DL (ref 74–106)
HCT VFR BLD CALC: 50.2 % (ref 35.4–49)
HGB BLD-MCNC: 16.7 GM/DL (ref 11.7–16.9)
LYMPHOCYTES # BLD: 3.3 % (ref 8–40)
MACROCYTES BLD QL: (no result)
MCH RBC QN AUTO: 34.5 PG (ref 25.7–33.7)
MCHC RBC AUTO-ENTMCNC: 33.2 G/DL (ref 32–35.9)
MCV RBC: 103.9 FL (ref 80–96)
MONOCYTES # BLD AUTO: 5.2 % (ref 3.8–10.2)
NEUTROPHILS # BLD: 91.3 % (ref 42.8–82.8)
OVALOCYTES BLD QL SMEAR: (no result)
PLATELET # BLD AUTO: 166 K/MM3 (ref 134–434)
PLATELET BLD QL SMEAR: NORMAL
PMV BLD: 7.7 FL (ref 7.5–11.1)
POTASSIUM SERPLBLD-SCNC: 4 MMOL/L (ref 3.5–5.1)
PROT SERPL-MCNC: 6.9 G/DL (ref 6.4–8.2)
RBC # BLD AUTO: 4.84 M/MM3 (ref 4–5.6)
SODIUM SERPL-SCNC: 137 MMOL/L (ref 136–145)
WBC # BLD AUTO: 7.8 K/MM3 (ref 4–10)

## 2019-04-07 RX ADMIN — DONEPEZIL HYDROCHLORIDE SCH MG: 10 TABLET, FILM COATED ORAL at 23:28

## 2019-04-07 RX ADMIN — DOCUSATE SODIUM SCH MG: 100 CAPSULE, LIQUID FILLED ORAL at 23:27

## 2019-04-07 RX ADMIN — SENNOSIDES PRN TAB: 8.6 TABLET, FILM COATED ORAL at 23:27

## 2019-04-07 RX ADMIN — OMEGA-3-ACID ETHYL ESTERS SCH GM: 1 CAPSULE, LIQUID FILLED ORAL at 11:08

## 2019-04-07 RX ADMIN — ALBUTEROL SULFATE PRN AMP: 2.5 SOLUTION RESPIRATORY (INHALATION) at 19:35

## 2019-04-07 RX ADMIN — DOCUSATE SODIUM SCH MG: 100 CAPSULE, LIQUID FILLED ORAL at 07:08

## 2019-04-07 RX ADMIN — DIGOXIN SCH MG: 125 TABLET ORAL at 11:07

## 2019-04-07 RX ADMIN — TORSEMIDE SCH MG: 20 TABLET ORAL at 14:26

## 2019-04-07 RX ADMIN — ATORVASTATIN CALCIUM SCH MG: 10 TABLET, FILM COATED ORAL at 23:28

## 2019-04-07 RX ADMIN — METHYLPREDNISOLONE SODIUM SUCCINATE SCH MG: 40 INJECTION, POWDER, FOR SOLUTION INTRAMUSCULAR; INTRAVENOUS at 17:49

## 2019-04-07 RX ADMIN — APIXABAN SCH MG: 5 TABLET, FILM COATED ORAL at 11:08

## 2019-04-07 RX ADMIN — OXYCODONE HYDROCHLORIDE AND ACETAMINOPHEN SCH MG: 500 TABLET ORAL at 11:08

## 2019-04-07 RX ADMIN — LEVOTHYROXINE SODIUM SCH MCG: 50 TABLET ORAL at 07:08

## 2019-04-07 RX ADMIN — OMEGA-3-ACID ETHYL ESTERS SCH GM: 1 CAPSULE, LIQUID FILLED ORAL at 23:27

## 2019-04-07 RX ADMIN — TORSEMIDE SCH MG: 20 TABLET ORAL at 07:08

## 2019-04-07 RX ADMIN — APIXABAN SCH MG: 5 TABLET, FILM COATED ORAL at 23:27

## 2019-04-07 RX ADMIN — PANTOPRAZOLE SODIUM SCH MG: 40 TABLET, DELAYED RELEASE ORAL at 11:08

## 2019-04-07 RX ADMIN — METHYLPREDNISOLONE SODIUM SUCCINATE SCH MG: 40 INJECTION, POWDER, FOR SOLUTION INTRAMUSCULAR; INTRAVENOUS at 11:08

## 2019-04-07 RX ADMIN — POLYETHYLENE GLYCOL 3350 SCH GRAMS: 17 POWDER, FOR SOLUTION ORAL at 11:00

## 2019-04-07 RX ADMIN — METHYLPREDNISOLONE SODIUM SUCCINATE SCH MG: 40 INJECTION, POWDER, FOR SOLUTION INTRAMUSCULAR; INTRAVENOUS at 03:11

## 2019-04-07 RX ADMIN — DOCUSATE SODIUM SCH MG: 100 CAPSULE, LIQUID FILLED ORAL at 14:26

## 2019-04-07 RX ADMIN — CEFTRIAXONE SCH MLS/HR: 1 INJECTION, POWDER, FOR SOLUTION INTRAMUSCULAR; INTRAVENOUS at 11:08

## 2019-04-07 NOTE — PN
Progress Note (short form)





- Note


Progress Note: 





s: no chest pain, palps dizzy. sob resolved


  


  


 Current Medications











Generic Name Dose Route Start Last Admin





  Trade Name Freq  PRN Reason Stop Dose Admin


 


Albuterol Sulfate  1 amp  03/31/19 03:41  04/06/19 19:45





  Ventolin 0.083% Nebulizer Soln -  NEB   1 amp





  Q6H PRN   Administration





  SHORT OF BREATH/WHEEZING   





     





     





     


 


Apixaban  5 mg  03/31/19 10:00  04/06/19 22:50





  Eliquis -  PO   5 mg





  BID GARY   Administration





     





     





     





     


 


Ascorbic Acid  500 mg  03/31/19 10:00  04/06/19 09:48





  Vitamin C -  PO   500 mg





  DAILY GARY   Administration





     





     





     





     


 


Atorvastatin Calcium  10 mg  03/31/19 22:00  04/06/19 22:50





  Lipitor -  PO   10 mg





  HS GARY   Administration





     





     





     





     


 


Carbamazepine  200 mg  03/31/19 22:00  04/06/19 22:53





  Tegretol -  PO   200 mg





  HS GARY   Administration





     





     





     





     


 


Digoxin  0.125 mg  03/31/19 10:00  04/06/19 09:47





  Lanoxin -  PO   0.125 mg





  DAILY GARY   Administration





     





     





     





     


 


Docusate Sodium  100 mg  04/06/19 14:00  04/07/19 07:08





  Colace -  PO   100 mg





  TID GARY   Administration





     





     





     





     


 


Donepezil HCl  10 mg  03/31/19 22:00  04/06/19 23:00





  Aricept -  PO   10 mg





  HS GARY   Administration





     





     





     





     


 


Ceftriaxone Sodium 0.5 gm/  50 mls @ 100 mls/hr  03/31/19 16:00  04/06/19 09:48





  Dextrose  IVPB   100 mls/hr





  DAILY GARY   Administration





     





     





  Protocol   





     


 


Levothyroxine Sodium  50 mcg  03/31/19 07:00  04/07/19 07:08





  Synthroid -  PO   50 mcg





  DAILY@0700 GARY   Administration





     





     





     





     


 


Methylprednisolone Sodium Succinate  40 mg  04/06/19 10:00  04/07/19 03:11





  Solu-Medrol -  IVPUSH   40 mg





  Q8H-IV GARY   Administration





     





     





     





     


 


Omega-3-Acid Ethyl Esters  1 gm  03/31/19 10:00  04/06/19 22:49





  Lovaza -  PO   1 gm





  BID GARY   Administration





     





     





     





     


 


Pantoprazole Sodium  40 mg  04/04/19 10:00  04/06/19 09:47





  Protonix -  PO   40 mg





  DAILY GARY   Administration





     





     





     





     


 


Polyethylene Glycol  17 gm  04/06/19 13:15  04/06/19 13:16





  Miralax (For Daily Use) -  PO   17 grams





  DAILY GARY   Administration





     





     





     





     


 


Senna  2 tab  04/01/19 19:55  04/06/19 22:50





  Senna -  PO   2 tab





  HS PRN   Administration





  CONSTIPATION   





     





     





     


 


Torsemide  40 mg  04/06/19 14:00  04/07/19 07:08





  Demadex -  PO   40 mg





  BIDLASIX GARY   Administration





     





     





     





     








 Vital Signs











 Period  Temp  Pulse  Resp  BP Sys/Gonsales  Pulse Ox


 


 Last 24 Hr  96.2 F-98.5 F  55-80  18-22  118-131/65-87  91-97











NAD


Irregular rate and rhythm. Nl S1, S2. 2/6 murmur at LSB


mild exp wheezes scattered


aaox 3


no le edema


+BS, soft, NT, distended. 


Pos dp/PT


No jaundice, diaphoresis. 





 


  CBC, BMP





 04/07/19 09:30 





 04/07/19 09:47 











Echo 2/18/16: Nl LV. RV mild-mod dilation with mild-mod systolic dysfunction.  

Severe pHTN (RVSP 62), mild TR. Mild Ao dilation.  





Pharm nuc stress 2/18/16: Afib with RVR. Asx. No ischemic changes. Nl perf. Nl 

EF. 





CXR: no congestion





CT chest: extensive COPD changes





EKG: sinus, PACs, afib





RLE ultrasound, no DVT, R popliteal artery aneurysm





echo 4/2019 low nl LV function, mildly reduced RV function, mildly dilated RA, 

mild MAC, mod TR, PASP at least 70 mmHg, mild AR, borderline aortic root 

dilation





tele: sinus, afib/flutter rate ok








a/p:


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 

on coronary calcifications on chest CT, stable aortic dilation of the ascending 

and abdominal aorta, HTN/HL, remote history of DVT, afib on eliquis p/w SOB, 

cough.





SOB, cough, COPD


- CT with significant findings for COPD, receiving nebs and steroids per primary

, pulm





acute Right heart failure, pulm HTN


- BNP >11,000 


- echo shows severe pulm HTN, low normal RV function


- received lasix 40 mg IV x 1, now on 80 mg IV BID - has abd distension, which 

he has had in the past with volume overload


4/6: vol status improved, cr rising, will change to oral torsemide now. 


4/7: cont torsemide, monitor Cr, lytes, daily standing weights.





Atrial fibrillation. 


-rate ok


- cont eliquis, digoxin





popliteal artery aneurysm


- vascular consulted, Dr. Taylor


- no cardiac contraindications to planned endovascular covered stent placement 

for R popliteal artery aneurysm





HL: 


- cont statin





dc tele

## 2019-04-07 NOTE — PN
Physical Exam: 


SUBJECTIVE: 





Patient seen and examined at the bedside.  sitting in chair in no acute 

distress.





OBJECTIVE:


 Vital Signs











 Period  Temp  Pulse  Resp  BP Sys/Gonsales  Pulse Ox


 


 Last 24 Hr  96.2 F-98.5 F  55-76  20-22  113-131/65-87  91-97








GENERAL: The patient is awake, alert and in no acute distress. 


HEAD: small open area on anterior head.  pt reports scratching his head.  no 

falls.


EYES: PERRL, sclera anicteric, conjunctiva clear. 


ORAL: moist mucous membranes


ENT: nares patent


NECK: Trachea midline, no JVD


LUNGS: lung sounds diminished at the bases.  no accessory muscle use. 


HEART: Regular rate and rhythm


ABDOMEN: Soft, nontender, nondistended, normoactive bowel sounds, no guarding


EXTREMITIES: 2+ pulses, warm, well-perfused, RLE calf edema, venous stasis 

changes


NEUROLOGICAL: AAOX2 Normal speech


PSYCH: Normal mood, normal affect.


SKIN: Warm, dry, normal turgor


 Laboratory Results - last 24 hr











  04/07/19 04/07/19





  09:30 09:47


 


WBC  7.8 


 


RBC  4.84 


 


Hgb  16.7 


 


Hct  50.2 H 


 


MCV  103.9 H 


 


MCH  34.5 H 


 


MCHC  33.2 


 


RDW  15.0 


 


Plt Count  166 


 


MPV  7.7 


 


Absolute Neuts (auto)  7.2 


 


Neutrophils %  91.3 H 


 


Neutrophils % (Manual)  91.9 H 


 


Band Neutrophils %  1.0 


 


Lymphocytes %  3.3 L 


 


Lymphocytes % (Manual)  3.0 L D 


 


Monocytes %  5.2  D 


 


Monocytes % (Manual)  4 


 


Eosinophils %  0.0 


 


Eosinophils % (Manual)  0.0 


 


Basophils %  0.2 


 


Basophils % (Manual)  0.0 


 


Myelocytes % (Man)  0 


 


Promyelocytes % (Man)  0 


 


Blast Cells % (Manual)  0 


 


Nucleated RBC %  0 


 


Metamyelocytes  0 


 


Hypochromia  0 


 


Platelet Estimate  Normal 


 


Polychromasia  0 


 


Poikilocytosis  1+ 


 


Anisocytosis  0 


 


Microcytosis  0 


 


Macrocytosis  1+ 


 


Ovalocytes  1+ 


 


Sodium   137


 


Potassium   4.0


 


Chloride   89 L


 


Carbon Dioxide   39 H


 


Anion Gap   9


 


BUN   80 H


 


Creatinine   1.8 H


 


Creat Clearance w eGFR   36.21


 


Random Glucose   221 H


 


Calcium   8.9


 


Total Bilirubin   1.0


 


AST   23


 


ALT   24


 


Alkaline Phosphatase   69


 


Total Protein   6.9


 


Albumin   3.2 L








Active Medications











Generic Name Dose Route Start Last Admin





  Trade Name Freq  PRN Reason Stop Dose Admin


 


Albuterol Sulfate  1 amp  03/31/19 03:41  04/06/19 19:45





  Ventolin 0.083% Nebulizer Soln -  NEB   1 amp





  Q6H PRN   Administration





  SHORT OF BREATH/WHEEZING   





     





     





     


 


Apixaban  5 mg  03/31/19 10:00  04/07/19 11:08





  Eliquis -  PO   5 mg





  BID GARY   Administration





     





     





     





     


 


Ascorbic Acid  500 mg  03/31/19 10:00  04/07/19 11:08





  Vitamin C -  PO   500 mg





  DAILY GARY   Administration





     





     





     





     


 


Atorvastatin Calcium  10 mg  03/31/19 22:00  04/06/19 22:50





  Lipitor -  PO   10 mg





  HS GARY   Administration





     





     





     





     


 


Carbamazepine  200 mg  03/31/19 22:00  04/06/19 22:53





  Tegretol -  PO   200 mg





  HS GARY   Administration





     





     





     





     


 


Digoxin  0.125 mg  03/31/19 10:00  04/07/19 11:07





  Lanoxin -  PO   0.125 mg





  DAILY GARY   Administration





     





     





     





     


 


Docusate Sodium  100 mg  04/06/19 14:00  04/07/19 14:26





  Colace -  PO   100 mg





  TID GARY   Administration





     





     





     





     


 


Donepezil HCl  10 mg  03/31/19 22:00  04/06/19 23:00





  Aricept -  PO   10 mg





  HS GARY   Administration





     





     





     





     


 


Levothyroxine Sodium  50 mcg  03/31/19 07:00  04/07/19 07:08





  Synthroid -  PO   50 mcg





  DAILY@0700 GARY   Administration





     





     





     





     


 


Methylprednisolone Sodium Succinate  40 mg  04/06/19 10:00  04/07/19 17:49





  Solu-Medrol -  IVPUSH   40 mg





  Q8H-IV GARY   Administration





     





     





     





     


 


Omega-3-Acid Ethyl Esters  1 gm  03/31/19 10:00  04/07/19 11:08





  Lovaza -  PO   1 gm





  BID GARY   Administration





     





     





     





     


 


Pantoprazole Sodium  40 mg  04/04/19 10:00  04/07/19 11:08





  Protonix -  PO   40 mg





  DAILY GARY   Administration





     





     





     





     


 


Polyethylene Glycol  17 gm  04/06/19 13:15  04/07/19 11:00





  Miralax (For Daily Use) -  PO   17 grams





  DAILY GARY   Administration





     





     





     





     


 


Senna  2 tab  04/01/19 19:55  04/06/19 22:50





  Senna -  PO   2 tab





  HS PRN   Administration





  CONSTIPATION   





     





     





     


 


Torsemide  40 mg  04/06/19 14:00  04/07/19 14:26





  Demadex -  PO   40 mg





  BIDLASIX GARY   Administration





     





     





     





     











ASSESSMENT/PLAN:


Patient is a 84 year old male with a significant past medical history of  COPD (

home oxygen dependent @ 2 liters), current daily smoker (quit 1 week ago) 

pulmonary hypertension, HLD, right sided CHF, atrial Flutter (on eliquis) 

hypothyroidism, dementia, depression, ANNA admitted with resp insufficiency and 

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 

vascular surgery. 





Pulmonary


COPD exacerbation, overall his breathing has improved.  Now back on nasal 

cannula at 2-3 liters (his home baseline) with stable saturations between 88-92%

. On solumedrol 40mg q6 for shortness of breath.  pulmonary following. 


-Duonebs prn


-continue with doxycycline and ceftriaxone


-Monitor airway


-on nocturnal bipap





Vascular:


Aneurysm of right popliteal artery


Vascular surgery following. Planning for stent for right popliteal artery 

aneurysm once medically cleared.





Card:


CHF


On Lasix 80mg IV BID.  monitor daily weights


Echo done 4/1: mod TR, EF 50-55%, no pericardial effusion





Afib


On digoxin 0.125mg daily, eliquis 5mg bid


 


HLD


On lipitor 10mg qhs, lovaza





Renal:


JEMAL


creat 1.7, has been higher on previous admission


on lasix bid dosing.


monitor renal function.   





Psyche:


Dementia


On aricept, namenda, tegretol





Hypothyroidism


On synthroid 50mcg daily





prophy:


bowel regimen with senna and colace


on eliquis for afib





fen


tolerating po


monitor electrolytes


low salt








Visit type





- Emergency Visit


Emergency Visit: Yes


ED Registration Date: 03/30/19


Care time: The patient presented to the Emergency Department on the above date 

and was hospitalized for further evaluation of their emergent condition.





- New Patient


This patient is new to me today: No





- Critical Care


Critical Care patient: No





- Discharge Referral


Referred to [facility] No

## 2019-04-07 NOTE — PN
Progress Note, Physician


History of Present Illness: 





pulmonary





drowsy,on nasal cannula,o2 sat 96%





- Current Medication List


Current Medications: 


Active Medications





Albuterol Sulfate (Ventolin 0.083% Nebulizer Soln -)  1 amp NEB Q6H PRN


   PRN Reason: SHORT OF BREATH/WHEEZING


   Last Admin: 04/06/19 19:45 Dose:  1 amp


Apixaban (Eliquis -)  5 mg PO BID [facility]


   Last Admin: 04/06/19 22:50 Dose:  5 mg


Ascorbic Acid (Vitamin C -)  500 mg PO DAILY [facility]


   Last Admin: 04/06/19 09:48 Dose:  500 mg


Atorvastatin Calcium (Lipitor -)  10 mg PO HS [facility]


   Last Admin: 04/06/19 22:50 Dose:  10 mg


Carbamazepine (Tegretol -)  200 mg PO HS [facility]


   Last Admin: 04/06/19 22:53 Dose:  200 mg


Digoxin (Lanoxin -)  0.125 mg PO DAILY [facility]


   Last Admin: 04/06/19 09:47 Dose:  0.125 mg


Docusate Sodium (Colace -)  100 mg PO TID [facility]


   Last Admin: 04/07/19 07:08 Dose:  100 mg


Donepezil HCl (Aricept -)  10 mg PO HS [facility]


   Last Admin: 04/06/19 23:00 Dose:  10 mg


Ceftriaxone Sodium 0.5 gm/ (Dextrose)  50 mls @ 100 mls/hr IVPB DAILY [facility]; 

Protocol


   Last Admin: 04/06/19 09:48 Dose:  100 mls/hr


Levothyroxine Sodium (Synthroid -)  50 mcg PO DAILY@0700 [facility]


   Last Admin: 04/07/19 07:08 Dose:  50 mcg


Methylprednisolone Sodium Succinate (Solu-Medrol -)  40 mg IVPUSH Q8H-IV GARY


   Last Admin: 04/07/19 03:11 Dose:  40 mg


Omega-3-Acid Ethyl Esters (Lovaza -)  1 gm PO BID [facility]


   Last Admin: 04/06/19 22:49 Dose:  1 gm


Pantoprazole Sodium (Protonix -)  40 mg PO DAILY [facility]


   Last Admin: 04/06/19 09:47 Dose:  40 mg


Polyethylene Glycol (Miralax (For Daily Use) -)  17 gm PO DAILY [facility]


   Last Admin: 04/06/19 13:16 Dose:  17 grams


Senna (Senna -)  2 tab PO HS PRN


   PRN Reason: CONSTIPATION


   Last Admin: 04/06/19 22:50 Dose:  2 tab


Torsemide (Demadex -)  40 mg PO BIDLASIX GARY


   Last Admin: 04/07/19 07:08 Dose:  40 mg











- Objective


Vital Signs: 


 Vital Signs











Temperature  96.2 F L  04/07/19 02:00


 


Pulse Rate  55 L  04/07/19 06:00


 


Respiratory Rate  20   04/07/19 06:00


 


Blood Pressure  131/87   04/07/19 06:00


 


O2 Sat by Pulse Oximetry (%)  97   04/07/19 04:27











Constitutional: Yes: Calm, Thin


Eyes: Yes: WNL


HENT: Yes: WNL


Neck: Yes: WNL


Cardiovascular: Yes: Pulse Irregular, S1, S2


Respiratory: Yes: Diminished, Rhonchi (few scattered rhonchi)


Gastrointestinal: Yes: Normal Bowel Sounds, Soft


Extremities: Yes: WNL


Edema: No


Labs: 


 





Assessment/Plan





Problem List





- Problems


(1) Community acquired pneumonia


Code(s): J18.9 - PNEUMONIA, UNSPECIFIED ORGANISM   





(2) Acute respiratory acidosis


Code(s): E87.2 - ACIDOSIS   





(3) Acute respiratory failure with hypoxia and hypercarbia


Code(s): J96.01 - ACUTE RESPIRATORY FAILURE WITH HYPOXIA; J96.02 - ACUTE 

RESPIRATORY FAILURE WITH HYPERCAPNIA   





(4) Aneurysm of right popliteal artery


Code(s): I72.4 - ANEURYSM OF ARTERY OF LOWER EXTREMITY   





(5) COPD with acute exacerbation


Code(s): J44.1 - CHRONIC OBSTRUCTIVE PULMONARY DISEASE W (ACUTE) EXACERBATION   





(6) Chronic respiratory acidosis


Code(s): E87.2 - ACIDOSIS   





(7) Leg edema, right


Code(s): R60.0 - LOCALIZED EDEMA   





(8) Right ventricular systolic dysfunction


Code(s): I51.9 - HEART DISEASE, UNSPECIFIED   





(9) Atrial flutter


Code(s): I48.92 - UNSPECIFIED ATRIAL FLUTTER   


Qualifiers: 


   Atrial flutter type: unspecified   Qualified Code(s): I48.92 - Unspecified 

atrial flutter   





(10) COPD (chronic obstructive pulmonary disease)


Code(s): J44.9 - CHRONIC OBSTRUCTIVE PULMONARY DISEASE, UNSPECIFIED   


Qualifiers: 


   COPD type: COPD with acute exacerbation   Qualified Code(s): J44.1 - Chronic 

obstructive pulmonary disease with (acute) exacerbation   





(11) Chronic cor pulmonale


Code(s): I27.81 - COR PULMONALE (CHRONIC)   





(12) Dementia


Code(s): F03.90 - UNSPECIFIED DEMENTIA WITHOUT BEHAVIORAL DISTURBANCE   


Qualifiers: 


   Dementia type: unspecified type   Dementia behavioral disturbance: without 

behavioral disturbance   Qualified Code(s): F03.90 - Unspecified dementia 

without behavioral disturbance   





(13) Hyperlipidemia


Code(s): E78.5 - HYPERLIPIDEMIA, UNSPECIFIED   


Qualifiers: 


   Hyperlipidemia type: unspecified   Qualified Code(s): E78.5 - Hyperlipidemia

, unspecified   





(14) Hypertension


Code(s): I10 - ESSENTIAL (PRIMARY) HYPERTENSION   


Qualifiers: 


   Hypertension type: essential hypertension   Qualified Code(s): I10 - 

Essential (primary) hypertension   





(15) Hypothyroidism


Code(s): E03.9 - HYPOTHYROIDISM, UNSPECIFIED   


Qualifiers: 


   Hypothyroidism type: unspecified   Qualified Code(s): E03.9 - Hypothyroidism

, unspecified   





(16) Obstructive sleep apnea


Code(s): G47.33 - OBSTRUCTIVE SLEEP APNEA (ADULT) (PEDIATRIC)   





(17) Pulmonary hypertension


Code(s): I27.2 - OTHER SECONDARY PULMONARY HYPERTENSION * DO NOT USE *   





Assessment/Plan


RLL PNA 


SEVERE PULMONARY HTN


AFIB


COPD


CHRONIC HYPOXEMIC RESPIRATORY FAILURE


OSAS





 ABX 


Steroids


NC O2 as tolerated 


lasix


NIPPV QHS and PRN 


No smoking discussed


AC 


BD TX standing and PRN 


monitor pulse ox


abg








DR EDWARDS











Problem List





- Problems


(1) Community acquired pneumonia


Code(s): J18.9 - PNEUMONIA, UNSPECIFIED ORGANISM   





(2) Acute respiratory acidosis


Code(s): E87.2 - ACIDOSIS   





(3) Acute respiratory failure with hypoxia and hypercarbia


Code(s): J96.01 - ACUTE RESPIRATORY FAILURE WITH HYPOXIA; J96.02 - ACUTE 

RESPIRATORY FAILURE WITH HYPERCAPNIA   





(4) Aneurysm of right popliteal artery


Code(s): I72.4 - ANEURYSM OF ARTERY OF LOWER EXTREMITY   





(5) COPD with acute exacerbation


Code(s): J44.1 - CHRONIC OBSTRUCTIVE PULMONARY DISEASE W (ACUTE) EXACERBATION   





(6) Chronic respiratory acidosis


Code(s): E87.2 - ACIDOSIS   





(7) Leg edema, right


Code(s): R60.0 - LOCALIZED EDEMA   





(8) Right ventricular systolic dysfunction


Code(s): I51.9 - HEART DISEASE, UNSPECIFIED   





(9) Atrial flutter


Code(s): I48.92 - UNSPECIFIED ATRIAL FLUTTER   


Qualifiers: 


   Atrial flutter type: unspecified   Qualified Code(s): I48.92 - Unspecified 

atrial flutter   





(10) COPD (chronic obstructive pulmonary disease)


Code(s): J44.9 - CHRONIC OBSTRUCTIVE PULMONARY DISEASE, UNSPECIFIED   


Qualifiers: 


   COPD type: COPD with acute exacerbation   Qualified Code(s): J44.1 - Chronic 

obstructive pulmonary disease with (acute) exacerbation   





(11) Chronic cor pulmonale


Code(s): I27.81 - COR PULMONALE (CHRONIC)   





(12) Dementia


Code(s): F03.90 - UNSPECIFIED DEMENTIA WITHOUT BEHAVIORAL DISTURBANCE   


Qualifiers: 


   Dementia type: unspecified type   Dementia behavioral disturbance: without 

behavioral disturbance   Qualified Code(s): F03.90 - Unspecified dementia 

without behavioral disturbance   





(13) Hyperlipidemia


Code(s): E78.5 - HYPERLIPIDEMIA, UNSPECIFIED   


Qualifiers: 


   Hyperlipidemia type: unspecified   Qualified Code(s): E78.5 - Hyperlipidemia

, unspecified   





(14) Hypertension


Code(s): I10 - ESSENTIAL (PRIMARY) HYPERTENSION   


Qualifiers: 


   Hypertension type: essential hypertension   Qualified Code(s): I10 - 

Essential (primary) hypertension   





(15) Hypothyroidism


Code(s): E03.9 - HYPOTHYROIDISM, UNSPECIFIED   


Qualifiers: 


   Hypothyroidism type: unspecified   Qualified Code(s): E03.9 - Hypothyroidism

, unspecified   





(16) Obstructive sleep apnea


Code(s): G47.33 - OBSTRUCTIVE SLEEP APNEA (ADULT) (PEDIATRIC)   





(17) Pulmonary hypertension


Code(s): I27.2 - OTHER SECONDARY PULMONARY HYPERTENSION * DO NOT USE *

## 2019-04-08 LAB
ALBUMIN SERPL-MCNC: 3.2 G/DL (ref 3.4–5)
ALP SERPL-CCNC: 68 U/L (ref 45–117)
ALT SERPL-CCNC: 24 U/L (ref 13–61)
ANION GAP SERPL CALC-SCNC: 7 MMOL/L (ref 8–16)
AST SERPL-CCNC: 21 U/L (ref 15–37)
BASOPHILS # BLD: 0.1 % (ref 0–2)
BILIRUB SERPL-MCNC: 1 MG/DL (ref 0.2–1)
BUN SERPL-MCNC: 81 MG/DL (ref 7–18)
CALCIUM SERPL-MCNC: 8.8 MG/DL (ref 8.5–10.1)
CHLORIDE SERPL-SCNC: 88 MMOL/L (ref 98–107)
CO2 SERPL-SCNC: 43 MMOL/L (ref 21–32)
CREAT SERPL-MCNC: 1.7 MG/DL (ref 0.55–1.3)
DEPRECATED RDW RBC AUTO: 15.3 % (ref 11.9–15.9)
EOSINOPHIL # BLD: 0.1 % (ref 0–4.5)
GLUCOSE SERPL-MCNC: 152 MG/DL (ref 74–106)
HCT VFR BLD CALC: 51.6 % (ref 35.4–49)
HGB BLD-MCNC: 17.1 GM/DL (ref 11.7–16.9)
LYMPHOCYTES # BLD: 5.8 % (ref 8–40)
MCH RBC QN AUTO: 34.2 PG (ref 25.7–33.7)
MCHC RBC AUTO-ENTMCNC: 33 G/DL (ref 32–35.9)
MCV RBC: 103.5 FL (ref 80–96)
MONOCYTES # BLD AUTO: 8.6 % (ref 3.8–10.2)
NEUTROPHILS # BLD: 85.4 % (ref 42.8–82.8)
PLATELET # BLD AUTO: 165 K/MM3 (ref 134–434)
PMV BLD: 7.6 FL (ref 7.5–11.1)
POTASSIUM SERPLBLD-SCNC: 3.6 MMOL/L (ref 3.5–5.1)
PROT SERPL-MCNC: 6.8 G/DL (ref 6.4–8.2)
RBC # BLD AUTO: 4.99 M/MM3 (ref 4–5.6)
SODIUM SERPL-SCNC: 137 MMOL/L (ref 136–145)
WBC # BLD AUTO: 7.9 K/MM3 (ref 4–10)

## 2019-04-08 RX ADMIN — DONEPEZIL HYDROCHLORIDE SCH MG: 10 TABLET, FILM COATED ORAL at 21:59

## 2019-04-08 RX ADMIN — DOCUSATE SODIUM SCH MG: 100 CAPSULE, LIQUID FILLED ORAL at 06:29

## 2019-04-08 RX ADMIN — OMEGA-3-ACID ETHYL ESTERS SCH GM: 1 CAPSULE, LIQUID FILLED ORAL at 21:59

## 2019-04-08 RX ADMIN — APIXABAN SCH: 5 TABLET, FILM COATED ORAL at 11:35

## 2019-04-08 RX ADMIN — POLYETHYLENE GLYCOL 3350 SCH GRAMS: 17 POWDER, FOR SOLUTION ORAL at 15:23

## 2019-04-08 RX ADMIN — METHYLPREDNISOLONE SODIUM SUCCINATE SCH MG: 40 INJECTION, POWDER, FOR SOLUTION INTRAMUSCULAR; INTRAVENOUS at 17:46

## 2019-04-08 RX ADMIN — OMEGA-3-ACID ETHYL ESTERS SCH GM: 1 CAPSULE, LIQUID FILLED ORAL at 11:32

## 2019-04-08 RX ADMIN — LEVOTHYROXINE SODIUM SCH MCG: 50 TABLET ORAL at 06:28

## 2019-04-08 RX ADMIN — DOCUSATE SODIUM SCH MG: 100 CAPSULE, LIQUID FILLED ORAL at 15:23

## 2019-04-08 RX ADMIN — METHYLPREDNISOLONE SODIUM SUCCINATE SCH MG: 40 INJECTION, POWDER, FOR SOLUTION INTRAMUSCULAR; INTRAVENOUS at 11:31

## 2019-04-08 RX ADMIN — METHYLPREDNISOLONE SODIUM SUCCINATE SCH MG: 40 INJECTION, POWDER, FOR SOLUTION INTRAMUSCULAR; INTRAVENOUS at 01:46

## 2019-04-08 RX ADMIN — OXYCODONE HYDROCHLORIDE AND ACETAMINOPHEN SCH MG: 500 TABLET ORAL at 11:31

## 2019-04-08 RX ADMIN — DIGOXIN SCH MG: 125 TABLET ORAL at 11:32

## 2019-04-08 RX ADMIN — PANTOPRAZOLE SODIUM SCH MG: 40 TABLET, DELAYED RELEASE ORAL at 11:34

## 2019-04-08 RX ADMIN — TORSEMIDE SCH MG: 20 TABLET ORAL at 15:23

## 2019-04-08 RX ADMIN — DOCUSATE SODIUM SCH MG: 100 CAPSULE, LIQUID FILLED ORAL at 21:59

## 2019-04-08 RX ADMIN — TORSEMIDE SCH MG: 20 TABLET ORAL at 06:28

## 2019-04-08 RX ADMIN — ATORVASTATIN CALCIUM SCH MG: 10 TABLET, FILM COATED ORAL at 21:59

## 2019-04-08 NOTE — PN
Physical Exam: 


SUBJECTIVE: Patient seen and examined at the bedside.  ate breakfast.  





OBJECTIVE:


 Vital Signs











 Period  Temp  Pulse  Resp  BP Sys/Gonsales  Pulse Ox


 


 Last 24 Hr  97.8 F-98.6 F  72-81  20-20  113-136/73-81  93-97








GENERAL: The patient is awake, alert and in no acute distress. 


HEAD: small open area on anterior head.  pt reports scratching his head.  no 

falls.


EYES: PERRL, sclera anicteric, conjunctiva clear. 


ORAL: moist mucous membranes


ENT: nares patent


NECK: Trachea midline, no JVD


LUNGS: lung sounds diminished at the bases. no wheezing or rhonchi. no 

accessory muscle use. 


HEART: Regular rate and rhythm


ABDOMEN: Soft, nontender, nondistended, normoactive bowel sounds, no guarding


EXTREMITIES: 2+ pulses, warm, well-perfused, RLE calf edema, venous stasis 

changes


NEUROLOGICAL: AAOX2 Normal speech


PSYCH: Normal mood, normal affect.


SKIN: Warm, dry, normal turgor


 Laboratory Results - last 24 hr











  04/07/19 04/08/19 04/08/19





  09:30 11:40 11:40


 


WBC   7.9 


 


RBC   4.99 


 


Hgb   17.1 H 


 


Hct   51.6 H 


 


MCV   103.5 H 


 


MCH   34.2 H 


 


MCHC   33.0 


 


RDW   15.3 


 


Plt Count   165 


 


MPV   7.6 


 


Absolute Neuts (auto)   6.7 


 


Neutrophils %   85.4 H 


 


Neutrophils % (Manual)  91.9 H  


 


Band Neutrophils %  1.0  


 


Lymphocytes %   5.8 L D 


 


Lymphocytes % (Manual)  3.0 L D  


 


Monocytes %   8.6 


 


Monocytes % (Manual)  4  


 


Eosinophils %   0.1  D 


 


Eosinophils % (Manual)  0.0  


 


Basophils %   0.1 


 


Basophils % (Manual)  0.0  


 


Myelocytes % (Man)  0  


 


Promyelocytes % (Man)  0  


 


Blast Cells % (Manual)  0  


 


Nucleated RBC %   0 


 


Metamyelocytes  0  


 


Hypochromia  0  


 


Platelet Estimate  Normal  


 


Polychromasia  0  


 


Poikilocytosis  1+  


 


Anisocytosis  0  


 


Microcytosis  0  


 


Macrocytosis  1+  


 


Ovalocytes  1+  


 


Sodium    137


 


Potassium    3.6


 


Chloride    88 L


 


Carbon Dioxide    43 H


 


Anion Gap    7 L


 


BUN    81 H


 


Creatinine    1.7 H


 


Creat Clearance w eGFR    38.68


 


Random Glucose    152 H


 


Calcium    8.8


 


Total Bilirubin    1.0


 


AST    21


 


ALT    24


 


Alkaline Phosphatase    68


 


Total Protein    6.8


 


Albumin    3.2 L








Active Medications











Generic Name Dose Route Start Last Admin





  Trade Name Freq  PRN Reason Stop Dose Admin


 


Albuterol Sulfate  1 amp  03/31/19 03:41  04/07/19 19:35





  Ventolin 0.083% Nebulizer Soln -  NEB   1 amp





  Q6H PRN   Administration





  SHORT OF BREATH/WHEEZING   





     





     





     


 


Ascorbic Acid  500 mg  03/31/19 10:00  04/08/19 11:31





  Vitamin C -  PO   500 mg





  DAILY GARY   Administration





     





     





     





     


 


Atorvastatin Calcium  10 mg  03/31/19 22:00  04/07/19 23:28





  Lipitor -  PO   10 mg





  HS GARY   Administration





     





     





     





     


 


Carbamazepine  200 mg  03/31/19 22:00  04/07/19 23:27





  Tegretol -  PO   200 mg





  HS GARY   Administration





     





     





     





     


 


Digoxin  0.125 mg  03/31/19 10:00  04/08/19 11:32





  Lanoxin -  PO   0.125 mg





  DAILY GARY   Administration





     





     





     





     


 


Docusate Sodium  100 mg  04/06/19 14:00  04/08/19 06:29





  Colace -  PO   100 mg





  TID GARY   Administration





     





     





     





     


 


Donepezil HCl  10 mg  03/31/19 22:00  04/07/19 23:28





  Aricept -  PO   10 mg





  HS GARY   Administration





     





     





     





     


 


Levothyroxine Sodium  50 mcg  03/31/19 07:00  04/08/19 06:28





  Synthroid -  PO   50 mcg





  DAILY@0700 GARY   Administration





     





     





     





     


 


Methylprednisolone Sodium Succinate  40 mg  04/06/19 10:00  04/08/19 11:31





  Solu-Medrol -  IVPUSH   40 mg





  Q8H-IV GARY   Administration





     





     





     





     


 


Omega-3-Acid Ethyl Esters  1 gm  03/31/19 10:00  04/08/19 11:32





  Lovaza -  PO   1 gm





  BID GARY   Administration





     





     





     





     


 


Pantoprazole Sodium  40 mg  04/04/19 10:00  04/08/19 11:34





  Protonix -  PO   40 mg





  DAILY GARY   Administration





     





     





     





     


 


Polyethylene Glycol  17 gm  04/06/19 13:15  04/07/19 11:00





  Miralax (For Daily Use) -  PO   17 grams





  DAILY GARY   Administration





     





     





     





     


 


Senna  2 tab  04/01/19 19:55  04/07/19 23:27





  Senna -  PO   2 tab





  HS PRN   Administration





  CONSTIPATION   





     





     





     


 


Torsemide  20 mg  04/08/19 12:00  





  Demadex -  PO   





  DAILY GARY   





     





     





     





     








ASSESSMENT/PLAN:


Patient is a 84 year old male with a significant past medical history of  COPD (

home oxygen dependent @ 2 liters), current daily smoker (quit 1 week ago) 

pulmonary hypertension, HLD, right sided CHF, atrial Flutter (on eliquis) 

hypothyroidism, dementia, depression, ANNA admitted with resp insufficiency and 

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 

vascular surgery. 





Pulmonary


COPD exacerbation, overall his breathing has improved.  


Now back on nasal cannula at 2-3 liters (his home baseline) with stable 

saturations between 88-92%. On solumedrol taper for shortness of breath.  


pulmonary following. 


-Duonebs prn


-continue with doxycycline and ceftriaxone


-Monitor airway


-on nocturnal bipap





Vascular:


Aneurysm of right popliteal artery


Vascular surgery following. Planning for stent for right popliteal artery 

aneurysm.  per cardiology, hold eliquis in anticipation of vascular surgery.  

start heparin drip in 48 hours if surgery not imminent.





Card:


CHF


on demadex.  monitor daily weights


Echo done 4/1: mod TR, EF 50-55%, no pericardial effusion





Afib


On digoxin 0.125mg daily, eliquis 5mg bid


 


HLD


On lipitor 10mg qhs, lovaza





Renal:


JEMAL


creat 1.7, has been higher on previous admission


on lasix bid dosing.


monitor renal function.   





Psyche:


Dementia


On aricept, namenda, tegretol





Hypothyroidism


On synthroid 50mcg daily





prophy:


bowel regimen with senna and colace


on eliquis for afib





fen


tolerating po


monitor electrolytes


low salt





Visit type





- Emergency Visit


Emergency Visit: Yes


ED Registration Date: 03/30/19


Care time: The patient presented to the Emergency Department on the above date 

and was hospitalized for further evaluation of their emergent condition.





- New Patient


This patient is new to me today: No





- Critical Care


Critical Care patient: No





- Discharge Referral


Referred to [facility] No

## 2019-04-08 NOTE — PN
Progress Note, Physician


Chief Complaint: 





sob


History of Present Illness: 





sob improving


no leg swelling


no cp, palpit


he admits to feeling tired





- Current Medication List


Current Medications: 


Active Medications





Albuterol Sulfate (Ventolin 0.083% Nebulizer Soln -)  1 amp NEB Q6H PRN


   PRN Reason: SHORT OF BREATH/WHEEZING


   Last Admin: 04/07/19 19:35 Dose:  1 amp


Apixaban (Eliquis -)  5 mg PO BID [facility]


   Last Admin: 04/07/19 23:27 Dose:  5 mg


Ascorbic Acid (Vitamin C -)  500 mg PO DAILY [facility]


   Last Admin: 04/07/19 11:08 Dose:  500 mg


Atorvastatin Calcium (Lipitor -)  10 mg PO HS [facility]


   Last Admin: 04/07/19 23:28 Dose:  10 mg


Carbamazepine (Tegretol -)  200 mg PO HS [facility]


   Last Admin: 04/07/19 23:27 Dose:  200 mg


Digoxin (Lanoxin -)  0.125 mg PO DAILY [facility]


   Last Admin: 04/07/19 11:07 Dose:  0.125 mg


Docusate Sodium (Colace -)  100 mg PO TID [facility]


   Last Admin: 04/08/19 06:29 Dose:  100 mg


Donepezil HCl (Aricept -)  10 mg PO HS [facility]


   Last Admin: 04/07/19 23:28 Dose:  10 mg


Levothyroxine Sodium (Synthroid -)  50 mcg PO DAILY@0700 [facility]


   Last Admin: 04/08/19 06:28 Dose:  50 mcg


Methylprednisolone Sodium Succinate (Solu-Medrol -)  40 mg IVPUSH Q8H-IV [facility]


   Last Admin: 04/08/19 01:46 Dose:  40 mg


Omega-3-Acid Ethyl Esters (Lovaza -)  1 gm PO BID [facility]


   Last Admin: 04/07/19 23:27 Dose:  1 gm


Pantoprazole Sodium (Protonix -)  40 mg PO DAILY [facility]


   Last Admin: 04/07/19 11:08 Dose:  40 mg


Polyethylene Glycol (Miralax (For Daily Use) -)  17 gm PO DAILY [facility]


   Last Admin: 04/07/19 11:00 Dose:  17 grams


Senna (Senna -)  2 tab PO HS PRN


   PRN Reason: CONSTIPATION


   Last Admin: 04/07/19 23:27 Dose:  2 tab


Torsemide (Demadex -)  40 mg PO BIDLASIX [facility]


   Last Admin: 04/08/19 06:28 Dose:  40 mg











- Objective


Vital Signs: 


 Vital Signs











Temperature  98 F   04/08/19 10:00


 


Pulse Rate  72   04/08/19 10:00


 


Respiratory Rate  20   04/08/19 10:00


 


Blood Pressure  130/78   04/08/19 10:00


 


O2 Sat by Pulse Oximetry (%)  95   04/08/19 09:00











Constitutional: Yes: No Distress, Calm


Eyes: No: Sclera Icterus


HENT: No: Nasal Congestion


Cardiovascular: Yes: Pulse Irregular, S1, S2, Other (PMI non diplaced).  No: JVD

, Gallop, Murmur


Respiratory: Yes: CTA Bilaterally (very decr diffusely).  No: Accessory Muscle 

Use, Rales, Wheezes


Gastrointestinal: Yes: Normal Bowel Sounds, Soft.  No: Tenderness


Musculoskeletal: Yes: Other (No kyphosis)


Extremities: No: Cold, Cyanosis


Edema: No


Integumentary: No: Jaundice


Neurological: Yes: Alert.  No: Seizure


Psychiatric: No: Agitated


Labs: 


 CBC, BMP





 04/07/19 09:30 





 04/07/19 09:47 





 INR, PTT











INR  1.28  (0.83-1.09)  H  04/03/19  07:15    














Assessment/Plan








Pharm nuc stress 2/18/16: Afib with RVR. Asx. No ischemic changes. Nl perf. Nl 

EF. 





CXR: no congestion





CT chest: extensive COPD changes





EKG: sinus, PACs, afib





RLE ultrasound, no DVT, R popliteal artery aneurysm





Echo 4/2019 low nl LV function, mildly reduced RV function, mildly dilated RA, 

mild MAC, mod TR, PASP at least 70 mmHg, mild AR, borderline aortic root 

dilation





tele: satr flutter, HRs good








a/p:


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 

on coronary calcifications on chest CT, stable aortic dilation of the ascending 

and abdominal aorta, HTN/HL, remote history of DVT, afib on eliquis p/w SOB, 

cough.





SOB, cough, COPD


- CT with significant findings for COPD, receiving nebs and steroids per primary

, pulm





acute Right heart failure, pulm HTN


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


- on home O2, follows with dr navarro for tx of sleep apnea


- BNP >11,000 


- echo shows severe pulm HTN, low normal RV function--stable long time


- received lasix 40 mg IV x 1, now on 80 mg IV BID - has abd distension, which 

he has had in the past with volume overload


4/6: vol status improved, cr rising, will change to oral torsemide now. 


4/7: cont torsemide 40 bid, monitor Cr, lytes, daily standing weights.


-4/8: bun/creat trending up, weights very variable, neck veins flat--pt likely 

overdiuresed/hypovolemic. change torsemide 40 bid to 20 qd, observe for LE 

edema (reliably his HF sx).





Atrial fibrillation. 


-rate ok, cont digoxin as doing (level good)


-on metoprolol at home as well, which he has tolerated well--held here and HR 

good, observe. defer CCB to avoid exacerbating RV contractility issues


-hold eliquis in anticipation of vascular surgery aneurysm endovascular repair. 

would start UFH gtt in 48 hrs if surgery not imminent (not sooner, given pt's 

reduced GFR and usual half-life of eliquis)





popliteal artery aneurysm, preop CV eval:


- vascular consulted, Dr. Taylor


- no cardiac contraindications to planned endovascular covered stent placement 

for R popliteal artery aneurysm. HF is resolved and well-controlled/optimized 

medically





HL: 


- cont statin

## 2019-04-08 NOTE — PN
Progress Note, Physician


History of Present Illness: 





pulmonary





no change,drowsy,-resp distress





- Current Medication List


Current Medications: 


Active Medications





Albuterol Sulfate (Ventolin 0.083% Nebulizer Soln -)  1 amp NEB Q6H PRN


   PRN Reason: SHORT OF BREATH/WHEEZING


   Last Admin: 04/07/19 19:35 Dose:  1 amp


Apixaban (Eliquis -)  5 mg PO BID [facility]


   Last Admin: 04/07/19 23:27 Dose:  5 mg


Ascorbic Acid (Vitamin C -)  500 mg PO DAILY [facility]


   Last Admin: 04/07/19 11:08 Dose:  500 mg


Atorvastatin Calcium (Lipitor -)  10 mg PO HS [facility]


   Last Admin: 04/07/19 23:28 Dose:  10 mg


Carbamazepine (Tegretol -)  200 mg PO HS [facility]


   Last Admin: 04/07/19 23:27 Dose:  200 mg


Digoxin (Lanoxin -)  0.125 mg PO DAILY [facility]


   Last Admin: 04/07/19 11:07 Dose:  0.125 mg


Docusate Sodium (Colace -)  100 mg PO TID [facility]


   Last Admin: 04/08/19 06:29 Dose:  100 mg


Donepezil HCl (Aricept -)  10 mg PO HS [facility]


   Last Admin: 04/07/19 23:28 Dose:  10 mg


Levothyroxine Sodium (Synthroid -)  50 mcg PO DAILY@0700 [facility]


   Last Admin: 04/08/19 06:28 Dose:  50 mcg


Methylprednisolone Sodium Succinate (Solu-Medrol -)  40 mg IVPUSH Q8H-IV [facility]


   Last Admin: 04/08/19 01:46 Dose:  40 mg


Omega-3-Acid Ethyl Esters (Lovaza -)  1 gm PO BID [facility]


   Last Admin: 04/07/19 23:27 Dose:  1 gm


Pantoprazole Sodium (Protonix -)  40 mg PO DAILY [facility]


   Last Admin: 04/07/19 11:08 Dose:  40 mg


Polyethylene Glycol (Miralax (For Daily Use) -)  17 gm PO DAILY [facility]


   Last Admin: 04/07/19 11:00 Dose:  17 grams


Senna (Senna -)  2 tab PO HS PRN


   PRN Reason: CONSTIPATION


   Last Admin: 04/07/19 23:27 Dose:  2 tab


Torsemide (Demadex -)  40 mg PO BIDLASIX [facility]


   Last Admin: 04/08/19 06:28 Dose:  40 mg











- Objective


Vital Signs: 


 Vital Signs











Temperature  98 F   04/08/19 10:00


 


Pulse Rate  72   04/08/19 10:00


 


Respiratory Rate  20   04/08/19 10:00


 


Blood Pressure  130/78   04/08/19 10:00


 


O2 Sat by Pulse Oximetry (%)  95   04/08/19 09:00











Constitutional: Yes: Calm, Thin


Eyes: Yes: WNL


HENT: Yes: WNL


Neck: Yes: WNL


Cardiovascular: Yes: Pulse Irregular, S1, S2


Respiratory: Yes: Rhonchi (few scattered rhonchi)


Gastrointestinal: Yes: Normal Bowel Sounds, Soft


Extremities: Yes: WNL


Edema: Yes


Labs: 


 CBC, BMP








Assessment/Plan





Problem List





- Problems


(1) Community acquired pneumonia


Code(s): J18.9 - PNEUMONIA, UNSPECIFIED ORGANISM   





(2) Acute respiratory acidosis


Code(s): E87.2 - ACIDOSIS   





(3) Acute respiratory failure with hypoxia and hypercarbia


Code(s): J96.01 - ACUTE RESPIRATORY FAILURE WITH HYPOXIA; J96.02 - ACUTE 

RESPIRATORY FAILURE WITH HYPERCAPNIA   





(4) Aneurysm of right popliteal artery


Code(s): I72.4 - ANEURYSM OF ARTERY OF LOWER EXTREMITY   





(5) COPD with acute exacerbation


Code(s): J44.1 - CHRONIC OBSTRUCTIVE PULMONARY DISEASE W (ACUTE) EXACERBATION   





(6) Chronic respiratory acidosis


Code(s): E87.2 - ACIDOSIS   





(7) Leg edema, right


Code(s): R60.0 - LOCALIZED EDEMA   





(8) Right ventricular systolic dysfunction


Code(s): I51.9 - HEART DISEASE, UNSPECIFIED   





(9) Atrial flutter


Code(s): I48.92 - UNSPECIFIED ATRIAL FLUTTER   


Qualifiers: 


   Atrial flutter type: unspecified   Qualified Code(s): I48.92 - Unspecified 

atrial flutter   





(10) COPD (chronic obstructive pulmonary disease)


Code(s): J44.9 - CHRONIC OBSTRUCTIVE PULMONARY DISEASE, UNSPECIFIED   


Qualifiers: 


   COPD type: COPD with acute exacerbation   Qualified Code(s): J44.1 - Chronic 

obstructive pulmonary disease with (acute) exacerbation   





(11) Chronic cor pulmonale


Code(s): I27.81 - COR PULMONALE (CHRONIC)   





(12) Dementia


Code(s): F03.90 - UNSPECIFIED DEMENTIA WITHOUT BEHAVIORAL DISTURBANCE   


Qualifiers: 


   Dementia type: unspecified type   Dementia behavioral disturbance: without 

behavioral disturbance   Qualified Code(s): F03.90 - Unspecified dementia 

without behavioral disturbance   





(13) Hyperlipidemia


Code(s): E78.5 - HYPERLIPIDEMIA, UNSPECIFIED   


Qualifiers: 


   Hyperlipidemia type: unspecified   Qualified Code(s): E78.5 - Hyperlipidemia

, unspecified   





(14) Hypertension


Code(s): I10 - ESSENTIAL (PRIMARY) HYPERTENSION   


Qualifiers: 


   Hypertension type: essential hypertension   Qualified Code(s): I10 - 

Essential (primary) hypertension   





(15) Hypothyroidism


Code(s): E03.9 - HYPOTHYROIDISM, UNSPECIFIED   


Qualifiers: 


   Hypothyroidism type: unspecified   Qualified Code(s): E03.9 - Hypothyroidism

, unspecified   





(16) Obstructive sleep apnea


Code(s): G47.33 - OBSTRUCTIVE SLEEP APNEA (ADULT) (PEDIATRIC)   





(17) Pulmonary hypertension


Code(s): I27.2 - OTHER SECONDARY PULMONARY HYPERTENSION * DO NOT USE *   





Assessment/Plan


RLL PNA 


SEVERE PULMONARY HTN


AFIB


COPD


CHRONIC HYPOXEMIC RESPIRATORY FAILURE


OSAS








Steroids


NC O2 as tolerated 


lasix


NIPPV QHS and PRN 


No smoking discussed


AC 


BD TX standing and PRN 


monitor pulse ox


abg








DR EDWARDS











Problem List





- Problems


(1) Community acquired pneumonia


Code(s): J18.9 - PNEUMONIA, UNSPECIFIED ORGANISM   





(2) Acute respiratory acidosis


Code(s): E87.2 - ACIDOSIS   





(3) Acute respiratory failure with hypoxia and hypercarbia


Code(s): J96.01 - ACUTE RESPIRATORY FAILURE WITH HYPOXIA; J96.02 - ACUTE 

RESPIRATORY FAILURE WITH HYPERCAPNIA   





(4) Aneurysm of right popliteal artery


Code(s): I72.4 - ANEURYSM OF ARTERY OF LOWER EXTREMITY   





(5) COPD with acute exacerbation


Code(s): J44.1 - CHRONIC OBSTRUCTIVE PULMONARY DISEASE W (ACUTE) EXACERBATION   





(6) Chronic respiratory acidosis


Code(s): E87.2 - ACIDOSIS   





(7) Leg edema, right


Code(s): R60.0 - LOCALIZED EDEMA   





(8) Right ventricular systolic dysfunction


Code(s): I51.9 - HEART DISEASE, UNSPECIFIED   





(9) Atrial flutter


Code(s): I48.92 - UNSPECIFIED ATRIAL FLUTTER   


Qualifiers: 


   Atrial flutter type: unspecified   Qualified Code(s): I48.92 - Unspecified 

atrial flutter   





(10) COPD (chronic obstructive pulmonary disease)


Code(s): J44.9 - CHRONIC OBSTRUCTIVE PULMONARY DISEASE, UNSPECIFIED   


Qualifiers: 


   COPD type: COPD with acute exacerbation   Qualified Code(s): J44.1 - Chronic 

obstructive pulmonary disease with (acute) exacerbation   





(11) Chronic cor pulmonale


Code(s): I27.81 - COR PULMONALE (CHRONIC)   





(12) Dementia


Code(s): F03.90 - UNSPECIFIED DEMENTIA WITHOUT BEHAVIORAL DISTURBANCE   


Qualifiers: 


   Dementia type: unspecified type   Dementia behavioral disturbance: without 

behavioral disturbance   Qualified Code(s): F03.90 - Unspecified dementia 

without behavioral disturbance   





(13) Hyperlipidemia


Code(s): E78.5 - HYPERLIPIDEMIA, UNSPECIFIED   


Qualifiers: 


   Hyperlipidemia type: unspecified   Qualified Code(s): E78.5 - Hyperlipidemia

, unspecified   





(14) Hypertension


Code(s): I10 - ESSENTIAL (PRIMARY) HYPERTENSION   


Qualifiers: 


   Hypertension type: essential hypertension   Qualified Code(s): I10 - 

Essential (primary) hypertension   





(15) Hypothyroidism


Code(s): E03.9 - HYPOTHYROIDISM, UNSPECIFIED   


Qualifiers: 


   Hypothyroidism type: unspecified   Qualified Code(s): E03.9 - Hypothyroidism

, unspecified   





(16) Obstructive sleep apnea


Code(s): G47.33 - OBSTRUCTIVE SLEEP APNEA (ADULT) (PEDIATRIC)   





(17) Pulmonary hypertension


Code(s): I27.2 - OTHER SECONDARY PULMONARY HYPERTENSION * DO NOT USE *

## 2019-04-09 LAB
ALBUMIN SERPL-MCNC: 3.2 G/DL (ref 3.4–5)
ALP SERPL-CCNC: 64 U/L (ref 45–117)
ALT SERPL-CCNC: 25 U/L (ref 13–61)
ANION GAP SERPL CALC-SCNC: 6 MMOL/L (ref 8–16)
AST SERPL-CCNC: 21 U/L (ref 15–37)
BASOPHILS # BLD: 0.1 % (ref 0–2)
BILIRUB SERPL-MCNC: 1.1 MG/DL (ref 0.2–1)
BUN SERPL-MCNC: 85 MG/DL (ref 7–18)
CALCIUM SERPL-MCNC: 9.1 MG/DL (ref 8.5–10.1)
CHLORIDE SERPL-SCNC: 86 MMOL/L (ref 98–107)
CO2 SERPL-SCNC: 44 MMOL/L (ref 21–32)
CREAT SERPL-MCNC: 1.6 MG/DL (ref 0.55–1.3)
DEPRECATED RDW RBC AUTO: 14.8 % (ref 11.9–15.9)
EOSINOPHIL # BLD: 0.1 % (ref 0–4.5)
GLUCOSE SERPL-MCNC: 110 MG/DL (ref 74–106)
HCT VFR BLD CALC: 51.5 % (ref 35.4–49)
HGB BLD-MCNC: 17.1 GM/DL (ref 11.7–16.9)
INR BLD: 1.05 (ref 0.83–1.09)
LYMPHOCYTES # BLD: 5.4 % (ref 8–40)
MAGNESIUM SERPL-MCNC: 2.5 MG/DL (ref 1.8–2.4)
MCH RBC QN AUTO: 34.4 PG (ref 25.7–33.7)
MCHC RBC AUTO-ENTMCNC: 33.3 G/DL (ref 32–35.9)
MCV RBC: 103.3 FL (ref 80–96)
MONOCYTES # BLD AUTO: 6 % (ref 3.8–10.2)
NEUTROPHILS # BLD: 88.4 % (ref 42.8–82.8)
PLATELET # BLD AUTO: 168 K/MM3 (ref 134–434)
PMV BLD: 7.9 FL (ref 7.5–11.1)
POTASSIUM SERPLBLD-SCNC: 3.7 MMOL/L (ref 3.5–5.1)
PROT SERPL-MCNC: 7.1 G/DL (ref 6.4–8.2)
PT PNL PPP: 12.4 SEC (ref 9.7–13)
RBC # BLD AUTO: 4.99 M/MM3 (ref 4–5.6)
SODIUM SERPL-SCNC: 136 MMOL/L (ref 136–145)
WBC # BLD AUTO: 8.1 K/MM3 (ref 4–10)

## 2019-04-09 RX ADMIN — POLYETHYLENE GLYCOL 3350 SCH GRAMS: 17 POWDER, FOR SOLUTION ORAL at 13:15

## 2019-04-09 RX ADMIN — OMEGA-3-ACID ETHYL ESTERS SCH: 1 CAPSULE, LIQUID FILLED ORAL at 21:13

## 2019-04-09 RX ADMIN — TORSEMIDE SCH MG: 20 TABLET ORAL at 10:46

## 2019-04-09 RX ADMIN — DOCUSATE SODIUM SCH MG: 100 CAPSULE, LIQUID FILLED ORAL at 13:14

## 2019-04-09 RX ADMIN — DOCUSATE SODIUM SCH MG: 100 CAPSULE, LIQUID FILLED ORAL at 06:26

## 2019-04-09 RX ADMIN — PANTOPRAZOLE SODIUM SCH MG: 40 TABLET, DELAYED RELEASE ORAL at 10:46

## 2019-04-09 RX ADMIN — OMEGA-3-ACID ETHYL ESTERS SCH GM: 1 CAPSULE, LIQUID FILLED ORAL at 10:46

## 2019-04-09 RX ADMIN — ATORVASTATIN CALCIUM SCH MG: 10 TABLET, FILM COATED ORAL at 21:02

## 2019-04-09 RX ADMIN — DOCUSATE SODIUM SCH: 100 CAPSULE, LIQUID FILLED ORAL at 21:13

## 2019-04-09 RX ADMIN — METHYLPREDNISOLONE SODIUM SUCCINATE SCH MG: 40 INJECTION, POWDER, FOR SOLUTION INTRAMUSCULAR; INTRAVENOUS at 01:24

## 2019-04-09 RX ADMIN — IPRATROPIUM BROMIDE AND ALBUTEROL SULFATE SCH AMP: .5; 3 SOLUTION RESPIRATORY (INHALATION) at 19:45

## 2019-04-09 RX ADMIN — IPRATROPIUM BROMIDE AND ALBUTEROL SULFATE SCH AMP: .5; 3 SOLUTION RESPIRATORY (INHALATION) at 16:17

## 2019-04-09 RX ADMIN — DONEPEZIL HYDROCHLORIDE SCH MG: 10 TABLET, FILM COATED ORAL at 21:02

## 2019-04-09 RX ADMIN — OXYCODONE HYDROCHLORIDE AND ACETAMINOPHEN SCH MG: 500 TABLET ORAL at 10:46

## 2019-04-09 RX ADMIN — METHYLPREDNISOLONE SODIUM SUCCINATE SCH MG: 40 INJECTION, POWDER, FOR SOLUTION INTRAMUSCULAR; INTRAVENOUS at 17:15

## 2019-04-09 RX ADMIN — METHYLPREDNISOLONE SODIUM SUCCINATE SCH MG: 40 INJECTION, POWDER, FOR SOLUTION INTRAMUSCULAR; INTRAVENOUS at 10:46

## 2019-04-09 RX ADMIN — DIGOXIN SCH MG: 125 TABLET ORAL at 10:46

## 2019-04-09 RX ADMIN — LEVOTHYROXINE SODIUM SCH MCG: 50 TABLET ORAL at 06:26

## 2019-04-09 NOTE — PN
Progress Note, Physician


History of Present Illness: 





pulmonary





drowsy on bipap,-resp distress





- Current Medication List


Current Medications: 


Active Medications





Ascorbic Acid (Vitamin C -)  500 mg PO DAILY [facility]


   Last Admin: 04/08/19 11:31 Dose:  500 mg


Atorvastatin Calcium (Lipitor -)  10 mg PO HS [facility]


   Last Admin: 04/08/19 21:59 Dose:  10 mg


Carbamazepine (Tegretol -)  200 mg PO HS [facility]


   Last Admin: 04/08/19 21:59 Dose:  200 mg


Digoxin (Lanoxin -)  0.125 mg PO DAILY [facility]


   Last Admin: 04/08/19 11:32 Dose:  0.125 mg


Docusate Sodium (Colace -)  100 mg PO TID [facility]


   Last Admin: 04/09/19 06:26 Dose:  100 mg


Donepezil HCl (Aricept -)  10 mg PO [facility]


   Last Admin: 04/08/19 21:59 Dose:  10 mg


Levothyroxine Sodium (Synthroid -)  50 mcg PO DAILY@0700 [facility]


   Last Admin: 04/09/19 06:26 Dose:  50 mcg


Methylprednisolone Sodium Succinate (Solu-Medrol -)  40 mg IVPUSH Q8H-IV [facility]


   Last Admin: 04/09/19 01:24 Dose:  40 mg


Omega-3-Acid Ethyl Esters (Lovaza -)  1 gm PO BID [facility]


   Last Admin: 04/08/19 21:59 Dose:  1 gm


Pantoprazole Sodium (Protonix -)  40 mg PO DAILY [facility]


   Last Admin: 04/08/19 11:34 Dose:  40 mg


Polyethylene Glycol (Miralax (For Daily Use) -)  17 gm PO DAILY [facility]


   Last Admin: 04/08/19 15:23 Dose:  17 grams


Senna (Senna -)  2 tab PO HS PRN


   PRN Reason: CONSTIPATION


   Last Admin: 04/07/19 23:27 Dose:  2 tab


Torsemide (Demadex -)  20 mg PO DAILY [facility]


   Last Admin: 04/08/19 15:23 Dose:  20 mg











- Objective


Vital Signs: 


 Vital Signs











Temperature  98.5 F   04/09/19 09:00


 


Pulse Rate  65   04/09/19 09:00


 


Respiratory Rate  21 H  04/09/19 09:00


 


Blood Pressure  132/78   04/09/19 09:00


 


O2 Sat by Pulse Oximetry (%)  96   04/09/19 09:00











Constitutional: Yes: Calm, Thin


Eyes: Yes: WNL


HENT: Yes: WNL


Neck: Yes: WNL


Cardiovascular: Yes: Pulse Irregular, S1, S2


Respiratory: Yes: Rhonchi (few rhonchi)


Gastrointestinal: Yes: Normal Bowel Sounds, Soft


Extremities: Yes: WNL


Edema: No


Labs: 


 CBC, BMP





 04/09/19 05:15 





 04/09/19 05:15 





 INR, PTT











INR  1.05  (0.83-1.09)   04/09/19  05:15    














Assessment/Plan





Problem List





- Problems


(1) Community acquired pneumonia


Code(s): J18.9 - PNEUMONIA, UNSPECIFIED ORGANISM   





(2) Acute respiratory acidosis


Code(s): E87.2 - ACIDOSIS   





(3) Acute respiratory failure with hypoxia and hypercarbia


Code(s): J96.01 - ACUTE RESPIRATORY FAILURE WITH HYPOXIA; J96.02 - ACUTE 

RESPIRATORY FAILURE WITH HYPERCAPNIA   





(4) Aneurysm of right popliteal artery


Code(s): I72.4 - ANEURYSM OF ARTERY OF LOWER EXTREMITY   





(5) COPD with acute exacerbation


Code(s): J44.1 - CHRONIC OBSTRUCTIVE PULMONARY DISEASE W (ACUTE) EXACERBATION   





(6) Chronic respiratory acidosis


Code(s): E87.2 - ACIDOSIS   





(7) Leg edema, right


Code(s): R60.0 - LOCALIZED EDEMA   





(8) Right ventricular systolic dysfunction


Code(s): I51.9 - HEART DISEASE, UNSPECIFIED   





(9) Atrial flutter


Code(s): I48.92 - UNSPECIFIED ATRIAL FLUTTER   


Qualifiers: 


   Atrial flutter type: unspecified   Qualified Code(s): I48.92 - Unspecified 

atrial flutter   





(10) COPD (chronic obstructive pulmonary disease)


Code(s): J44.9 - CHRONIC OBSTRUCTIVE PULMONARY DISEASE, UNSPECIFIED   


Qualifiers: 


   COPD type: COPD with acute exacerbation   Qualified Code(s): J44.1 - Chronic 

obstructive pulmonary disease with (acute) exacerbation   





(11) Chronic cor pulmonale


Code(s): I27.81 - COR PULMONALE (CHRONIC)   





(12) Dementia


Code(s): F03.90 - UNSPECIFIED DEMENTIA WITHOUT BEHAVIORAL DISTURBANCE   


Qualifiers: 


   Dementia type: unspecified type   Dementia behavioral disturbance: without 

behavioral disturbance   Qualified Code(s): F03.90 - Unspecified dementia 

without behavioral disturbance   





(13) Hyperlipidemia


Code(s): E78.5 - HYPERLIPIDEMIA, UNSPECIFIED   


Qualifiers: 


   Hyperlipidemia type: unspecified   Qualified Code(s): E78.5 - Hyperlipidemia

, unspecified   





(14) Hypertension


Code(s): I10 - ESSENTIAL (PRIMARY) HYPERTENSION   


Qualifiers: 


   Hypertension type: essential hypertension   Qualified Code(s): I10 - 

Essential (primary) hypertension   





(15) Hypothyroidism


Code(s): E03.9 - HYPOTHYROIDISM, UNSPECIFIED   


Qualifiers: 


   Hypothyroidism type: unspecified   Qualified Code(s): E03.9 - Hypothyroidism

, unspecified   





(16) Obstructive sleep apnea


Code(s): G47.33 - OBSTRUCTIVE SLEEP APNEA (ADULT) (PEDIATRIC)   





(17) Pulmonary hypertension


Code(s): I27.2 - OTHER SECONDARY PULMONARY HYPERTENSION * DO NOT USE *   





Assessment/Plan


RLL PNA 


SEVERE PULMONARY HTN


AFIB


COPD


CHRONIC HYPOXEMIC RESPIRATORY FAILURE


OSAS


POPLITEAL ANEURYSM








Steroids


NC O2 as tolerated 


lasix


NIPPV QHS and PRN 


No smoking discussed


AC 


BD TX standing and PRN 


monitor pulse ox


endovascular repair as per vascular











DR EDWARDS











Problem List





- Problems


(1) Community acquired pneumonia


Code(s): J18.9 - PNEUMONIA, UNSPECIFIED ORGANISM   





(2) Acute respiratory acidosis


Code(s): E87.2 - ACIDOSIS   





(3) Acute respiratory failure with hypoxia and hypercarbia


Code(s): J96.01 - ACUTE RESPIRATORY FAILURE WITH HYPOXIA; J96.02 - ACUTE 

RESPIRATORY FAILURE WITH HYPERCAPNIA   





(4) Aneurysm of right popliteal artery


Code(s): I72.4 - ANEURYSM OF ARTERY OF LOWER EXTREMITY   





(5) COPD with acute exacerbation


Code(s): J44.1 - CHRONIC OBSTRUCTIVE PULMONARY DISEASE W (ACUTE) EXACERBATION   





(6) Chronic respiratory acidosis


Code(s): E87.2 - ACIDOSIS   





(7) Leg edema, right


Code(s): R60.0 - LOCALIZED EDEMA   





(8) Right ventricular systolic dysfunction


Code(s): I51.9 - HEART DISEASE, UNSPECIFIED   





(9) Atrial flutter


Code(s): I48.92 - UNSPECIFIED ATRIAL FLUTTER   


Qualifiers: 


   Atrial flutter type: unspecified   Qualified Code(s): I48.92 - Unspecified 

atrial flutter   





(10) COPD (chronic obstructive pulmonary disease)


Code(s): J44.9 - CHRONIC OBSTRUCTIVE PULMONARY DISEASE, UNSPECIFIED   


Qualifiers: 


   COPD type: COPD with acute exacerbation   Qualified Code(s): J44.1 - Chronic 

obstructive pulmonary disease with (acute) exacerbation   





(11) Chronic cor pulmonale


Code(s): I27.81 - COR PULMONALE (CHRONIC)   





(12) Dementia


Code(s): F03.90 - UNSPECIFIED DEMENTIA WITHOUT BEHAVIORAL DISTURBANCE   


Qualifiers: 


   Dementia type: unspecified type   Dementia behavioral disturbance: without 

behavioral disturbance   Qualified Code(s): F03.90 - Unspecified dementia 

without behavioral disturbance   





(13) Hyperlipidemia


Code(s): E78.5 - HYPERLIPIDEMIA, UNSPECIFIED   


Qualifiers: 


   Hyperlipidemia type: unspecified   Qualified Code(s): E78.5 - Hyperlipidemia

, unspecified   





(14) Hypertension


Code(s): I10 - ESSENTIAL (PRIMARY) HYPERTENSION   


Qualifiers: 


   Hypertension type: essential hypertension   Qualified Code(s): I10 - 

Essential (primary) hypertension   





(15) Hypothyroidism


Code(s): E03.9 - HYPOTHYROIDISM, UNSPECIFIED   


Qualifiers: 


   Hypothyroidism type: unspecified   Qualified Code(s): E03.9 - Hypothyroidism

, unspecified   





(16) Obstructive sleep apnea


Code(s): G47.33 - OBSTRUCTIVE SLEEP APNEA (ADULT) (PEDIATRIC)   





(17) Pulmonary hypertension


Code(s): I27.2 - OTHER SECONDARY PULMONARY HYPERTENSION * DO NOT USE *

## 2019-04-09 NOTE — PN
Progress Note (short form)





- Note


Progress Note: 





s: sob improving, no cp, palps, dizziness


 Current Medications





Ascorbic Acid (Vitamin C -)  500 mg PO DAILY [facility]


   Last Admin: 04/09/19 10:46 Dose:  500 mg


Atorvastatin Calcium (Lipitor -)  10 mg PO HS [facility]


   Last Admin: 04/08/19 21:59 Dose:  10 mg


Carbamazepine (Tegretol -)  200 mg PO HS [facility]


   Last Admin: 04/08/19 21:59 Dose:  200 mg


Digoxin (Lanoxin -)  0.125 mg PO DAILY [facility]


   Last Admin: 04/09/19 10:46 Dose:  0.125 mg


Docusate Sodium (Colace -)  100 mg PO TID [facility]


   Last Admin: 04/09/19 06:26 Dose:  100 mg


Donepezil HCl (Aricept -)  10 mg PO [facility]


   Last Admin: 04/08/19 21:59 Dose:  10 mg


Levothyroxine Sodium (Synthroid -)  50 mcg PO DAILY@0700 [facility]


   Last Admin: 04/09/19 06:26 Dose:  50 mcg


Methylprednisolone Sodium Succinate (Solu-Medrol -)  40 mg IVPUSH Q8H-IV [facility]


   Last Admin: 04/09/19 10:46 Dose:  40 mg


Omega-3-Acid Ethyl Esters (Lovaza -)  1 gm PO BID [facility]


   Last Admin: 04/09/19 10:46 Dose:  1 gm


Pantoprazole Sodium (Protonix -)  40 mg PO DAILY [facility]


   Last Admin: 04/09/19 10:46 Dose:  40 mg


Polyethylene Glycol (Miralax (For Daily Use) -)  17 gm PO DAILY [facility]


   Last Admin: 04/08/19 15:23 Dose:  17 grams


Senna (Senna -)  2 tab PO HS PRN


   PRN Reason: CONSTIPATION


   Last Admin: 04/07/19 23:27 Dose:  2 tab


Torsemide (Demadex -)  20 mg PO DAILY [facility]


   Last Admin: 04/09/19 10:46 Dose:  20 mg








  Vital Signs











 Period  Temp  Pulse  Resp  BP Sys/Gonsales  Pulse Ox


 


 Last 24 Hr  97.3 F-98.5 F  63-87  20-21  131-137/64-88  92-96














Constitutional: Yes: No Distress, Calm


Eyes: No: Sclera Icterus


HENT: No: Nasal Congestion


Cardiovascular: Yes: Pulse Irregular, S1, S2, Other (PMI non diplaced).  No: JVD

, Gallop, Murmur


Respiratory: Yes: CTA Bilaterally (very decr diffusely).  No: Accessory Muscle 

Use, Rales, Wheezes


Gastrointestinal: Yes: Normal Bowel Sounds, Soft.  No: Tenderness


Musculoskeletal: Yes: Other (No kyphosis)


Extremities: No: Cold, Cyanosis


Edema: No


Integumentary: No: Jaundice


Neurological: Yes: Alert.  No: Seizure


Psychiatric: No: Agitated








Assessment/Plan








Pharm nuc stress 2/18/16: Afib with RVR. Asx. No ischemic changes. Nl perf. Nl 

EF. 





CXR: no congestion





CT chest: extensive COPD changes





EKG: sinus, PACs, afib





RLE ultrasound, no DVT, R popliteal artery aneurysm





Echo 4/2019 low nl LV function, mildly reduced RV function, mildly dilated RA, 

mild MAC, mod TR, PASP at least 70 mmHg, mild AR, borderline aortic root 

dilation





tele: satr flutter, HRs good, richard o/n








a/p:


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 

on coronary calcifications on chest CT, stable aortic dilation of the ascending 

and abdominal aorta, HTN/HL, remote history of DVT, afib on eliquis p/w SOB, 

cough.





SOB, cough, COPD


- CT with significant findings for COPD, receiving nebs and steroids per primary

, pulm





acute Right heart failure, pulm HTN


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


- on home O2, follows with dr navarro for tx of sleep apnea


- BNP >11,000 


- echo shows severe pulm HTN, low normal RV function--stable long time


- received lasix 40 mg IV x 1, now on 80 mg IV BID - has abd distension, which 

he has had in the past with volume overload


4/6: vol status improved, cr rising, will change to oral torsemide now. 


4/7: cont torsemide 40 bid, monitor Cr, lytes, daily standing weights.


-4/8: bun/creat trending up, weights very variable, neck veins flat--pt likely 

overdiuresed/hypovolemic. change torsemide 40 bid to 20 qd, observe for LE 

edema (reliably his HF sx).


-4/9: BUN/Cr improving, continue torsemide 20 mg daliy





Atrial fibrillation. 


-rate ok, cont digoxin as doing (level good)


-on metoprolol at home as well, which he has tolerated well--held here and HR 

good, observe. defer CCB to avoid exacerbating RV contractility issues


-hold eliquis in anticipation of vascular surgery aneurysm endovascular repair. 

would start UFH gtt in 48 hrs if surgery not imminent (not sooner, given pt's 

reduced GFR and usual half-life of eliquis) - last dose 4/7 PM


- dc tele





popliteal artery aneurysm, preop CV eval:


- vascular consulted, Dr. Taylor


- no cardiac contraindications to planned endovascular covered stent placement 

for R popliteal artery aneurysm. HF is resolved and well-controlled/optimized 

medically





HL: 


- cont statin

## 2019-04-09 NOTE — SPA.PREOP
- PRE-OP NOTE





Dx: 3cm Rt popliteal artery aneurysm. 1.5cm Lt poplteal artery aneurysm is 

1.5cm. 





Planned Procedure: Angio, endovascular covered stent(s)





Surgeon: Justus Taylor





 Last Vital Signs











Temp Pulse Resp BP Pulse Ox


 


 97.7 F   70   20   131/64   96 


 


 04/09/19 05:40  04/09/19 05:40  04/09/19 05:40  04/09/19 05:40  04/08/19 20:15








 Lab Results











WBC  8.1 K/mm3 (4.0-10.0)   04/09/19  05:15    


 


RBC  4.99 M/mm3 (4.00-5.60)   04/09/19  05:15    


 


Hgb  17.1 GM/dL (11.7-16.9)  H  04/09/19  05:15    


 


Hct  51.5 % (35.4-49)  H  04/09/19  05:15    


 


MCV  103.3 fl (80-96)  H  04/09/19  05:15    


 


MCHC  33.3 g/dl (32.0-35.9)   04/09/19  05:15    


 


RDW  14.8 % (11.9-15.9)   04/09/19  05:15    


 


Plt Count  168 K/MM3 (134-434)   04/09/19  05:15    


 


Sodium  136 mmol/L (136-145)   04/09/19  05:15    


 


Potassium  3.7 mmol/L (3.5-5.1)   04/09/19  05:15    


 


Chloride  86 mmol/L ()  L  04/09/19  05:15    


 


Carbon Dioxide  44 mmol/L (21-32)  H  04/09/19  05:15    


 


Anion Gap  6 MMOL/L (8-16)  L  04/09/19  05:15    


 


BUN  85 mg/dL (7-18)  H  04/09/19  05:15    


 


Creatinine  1.6 mg/dL (0.55-1.3)  H  04/09/19  05:15    


 


Random Glucose  110 mg/dL ()  H  04/09/19  05:15    


 


Calcium  9.1 mg/dL (8.5-10.1)   04/09/19  05:15    


 


INR  1.05  (0.83-1.09)   04/09/19  05:15    














- IMAGING


Chest X-ray: Report Reviewed





- ASSESSMENT/PLAN





   








Problem List





- Problems


(1) Aneurysm of right popliteal artery


Assessment/Plan: 





1. NPO after midnight except po meds


2. GI/DVT PPX


3. Medical optimization


4. Pulmonary optimization / clearance


5. Cardio cleared --> no cardiac contraindications to planned procedure. HF is 

resolved and well-controlled/optimized medically


5. Consent to be obtained by surgeon after risks, benefits and alternatives 

discussed with patient and or Health Care Proxy.


Code(s): I72.4 - ANEURYSM OF ARTERY OF LOWER EXTREMITY   





(2) COPD (chronic obstructive pulmonary disease)


Code(s): J44.9 - CHRONIC OBSTRUCTIVE PULMONARY DISEASE, UNSPECIFIED   


Qualifiers: 


   COPD type: COPD with acute exacerbation   Qualified Code(s): J44.1 - Chronic 

obstructive pulmonary disease with (acute) exacerbation   





(3) Obstructive sleep apnea


Code(s): G47.33 - OBSTRUCTIVE SLEEP APNEA (ADULT) (PEDIATRIC)   





(4) Pulmonary hypertension


Code(s): I27.2 - OTHER SECONDARY PULMONARY HYPERTENSION * DO NOT USE *   





Visit type





- Case Type


Case Type: ED Admission

## 2019-04-09 NOTE — PN
Physical Exam: 


SUBJECTIVE: Patient seen and examined. He denies SOB. 








OBJECTIVE:





 Vital Signs











 Period  Temp  Pulse  Resp  BP Sys/Gonsales  Pulse Ox


 


 Last 24 Hr  97.3 F-98 F  63-87  20-20  130-137/64-88  92-96











GENERAL: The patient is awake, alert, on BiPAP.


LUNGS: Breath sounds diminished, few rhonchi. 


HEART: Irregularly irregular without murmur, rub or gallop.


ABDOMEN: Soft, nontender, nondistended, normoactive bowel sounds, no guarding, 

no rebound, no hepatosplenomegaly, no masses.


EXTREMITIES: 2+ pulses, warm, well-perfused, no edema. 














 Laboratory Results - last 24 hr











  04/08/19 04/08/19 04/09/19





  11:40 11:40 05:15


 


WBC  7.9   8.1


 


RBC  4.99   4.99


 


Hgb  17.1 H   17.1 H


 


Hct  51.6 H   51.5 H


 


MCV  103.5 H   103.3 H


 


MCH  34.2 H   34.4 H


 


MCHC  33.0   33.3


 


RDW  15.3   14.8


 


Plt Count  165   168


 


MPV  7.6   7.9


 


Absolute Neuts (auto)  6.7   7.2


 


Neutrophils %  85.4 H   88.4 H


 


Lymphocytes %  5.8 L D   5.4 L


 


Monocytes %  8.6   6.0


 


Eosinophils %  0.1  D   0.1


 


Basophils %  0.1   0.1


 


Nucleated RBC %  0   0


 


PT with INR   


 


INR   


 


Sodium   137 


 


Potassium   3.6 


 


Chloride   88 L 


 


Carbon Dioxide   43 H 


 


Anion Gap   7 L 


 


BUN   81 H 


 


Creatinine   1.7 H 


 


Creat Clearance w eGFR   38.68 


 


Random Glucose   152 H 


 


Calcium   8.8 


 


Magnesium   


 


Total Bilirubin   1.0 


 


AST   21 


 


ALT   24 


 


Alkaline Phosphatase   68 


 


Total Protein   6.8 


 


Albumin   3.2 L 














  04/09/19 04/09/19





  05:15 05:15


 


WBC  


 


RBC  


 


Hgb  


 


Hct  


 


MCV  


 


MCH  


 


MCHC  


 


RDW  


 


Plt Count  


 


MPV  


 


Absolute Neuts (auto)  


 


Neutrophils %  


 


Lymphocytes %  


 


Monocytes %  


 


Eosinophils %  


 


Basophils %  


 


Nucleated RBC %  


 


PT with INR   12.40


 


INR   1.05


 


Sodium  136 


 


Potassium  3.7 


 


Chloride  86 L 


 


Carbon Dioxide  44 H 


 


Anion Gap  6 L 


 


BUN  85 H 


 


Creatinine  1.6 H 


 


Creat Clearance w eGFR  41.49 


 


Random Glucose  110 H 


 


Calcium  9.1 


 


Magnesium  2.5 H 


 


Total Bilirubin  1.1 H 


 


AST  21 


 


ALT  25 


 


Alkaline Phosphatase  64 


 


Total Protein  7.1 


 


Albumin  3.2 L 








Active Medications











Generic Name Dose Route Start Last Admin





  Trade Name Freq  PRN Reason Stop Dose Admin


 


Ascorbic Acid  500 mg  03/31/19 10:00  04/08/19 11:31





  Vitamin C -  PO   500 mg





  DAILY GARY   Administration





     





     





     





     


 


Atorvastatin Calcium  10 mg  03/31/19 22:00  04/08/19 21:59





  Lipitor -  PO   10 mg





  HS GARY   Administration





     





     





     





     


 


Carbamazepine  200 mg  03/31/19 22:00  04/08/19 21:59





  Tegretol -  PO   200 mg





  HS GARY   Administration





     





     





     





     


 


Digoxin  0.125 mg  03/31/19 10:00  04/08/19 11:32





  Lanoxin -  PO   0.125 mg





  DAILY GARY   Administration





     





     





     





     


 


Docusate Sodium  100 mg  04/06/19 14:00  04/09/19 06:26





  Colace -  PO   100 mg





  TID GARY   Administration





     





     





     





     


 


Donepezil HCl  10 mg  03/31/19 22:00  04/08/19 21:59





  Aricept -  PO   10 mg





  HS GARY   Administration





     





     





     





     


 


Levothyroxine Sodium  50 mcg  03/31/19 07:00  04/09/19 06:26





  Synthroid -  PO   50 mcg





  DAILY@0700 GARY   Administration





     





     





     





     


 


Methylprednisolone Sodium Succinate  40 mg  04/06/19 10:00  04/09/19 01:24





  Solu-Medrol -  IVPUSH   40 mg





  Q8H-IV GARY   Administration





     





     





     





     


 


Omega-3-Acid Ethyl Esters  1 gm  03/31/19 10:00  04/08/19 21:59





  Lovaza -  PO   1 gm





  BID GARY   Administration





     





     





     





     


 


Pantoprazole Sodium  40 mg  04/04/19 10:00  04/08/19 11:34





  Protonix -  PO   40 mg





  DAILY GARY   Administration





     





     





     





     


 


Polyethylene Glycol  17 gm  04/06/19 13:15  04/08/19 15:23





  Miralax (For Daily Use) -  PO   17 grams





  DAILY GARY   Administration





     





     





     





     


 


Senna  2 tab  04/01/19 19:55  04/07/19 23:27





  Senna -  PO   2 tab





  HS PRN   Administration





  CONSTIPATION   





     





     





     


 


Torsemide  20 mg  04/08/19 12:00  04/08/19 15:23





  Demadex -  PO   20 mg





  DAILY GARY   Administration





     





     





     





     











ASSESSMENT/PLAN:


This is an 84 year old man with a history of COPD, chronic hypoxic respiratory 

failure, pulmonary HTN, hyperlipidemia, right-sided heart failure, atrial fib/

flutter, hypothyroidism, dementia, depression, ANNA who presented to the ED with 

SOB and leg swelling.





1. Acute exacerbation of COPD secondary to pneumonia with chronic hypoxic 

respiratory failure, ANNA, and severe pulm HTN


   - Continue SoluMedrol, DuoNeb, albuterol as needed


   - Continue oxygen to maintain saturation >90%


   - Continue BiPAP overnight and as needed


   - Completed course of ceftriaxone


2. Right popliteal artery aneurysm


   - Plan for endovascular stent placement tomorrow


   - Eliquis held for surgery


3. Acute on chronic right-sided heart failure


   - Improved with Lasix IV


   - Continue Torsemide


4. Permanent atrial fib


   - Continue Digoxin


   - Eliquis on hold for surgery


5. Hyperlipidemia


   - Continue Lipitor, Lovaza


6. Hypothyroidism


   - Continue Synthroid


7. Depression


   - Continue Tegretol


8. Dementia


   - Continue Aricept


9. Stage 3 CKD


   - Stable





Visit type





- Emergency Visit


Emergency Visit: Yes


ED Registration Date: 03/30/19


Care time: The patient presented to the Emergency Department on the above date 

and was hospitalized for further evaluation of their emergent condition.





- New Patient


This patient is new to me today: Yes


Date on this admission: 04/09/19





- Critical Care


Critical Care patient: No





- Discharge Referral


Referred to [facility] No

## 2019-04-10 LAB
ANION GAP SERPL CALC-SCNC: 6 MMOL/L (ref 8–16)
BUN SERPL-MCNC: 82 MG/DL (ref 7–18)
CALCIUM SERPL-MCNC: 8.9 MG/DL (ref 8.5–10.1)
CHLORIDE SERPL-SCNC: 91 MMOL/L (ref 98–107)
CO2 SERPL-SCNC: 44 MMOL/L (ref 21–32)
CREAT SERPL-MCNC: 1.7 MG/DL (ref 0.55–1.3)
DEPRECATED RDW RBC AUTO: 15.1 % (ref 11.9–15.9)
GLUCOSE SERPL-MCNC: 123 MG/DL (ref 74–106)
HCT VFR BLD CALC: 51.7 % (ref 35.4–49)
HGB BLD-MCNC: 17.4 GM/DL (ref 11.7–16.9)
MCH RBC QN AUTO: 34.3 PG (ref 25.7–33.7)
MCHC RBC AUTO-ENTMCNC: 33.6 G/DL (ref 32–35.9)
MCV RBC: 102.2 FL (ref 80–96)
PLATELET # BLD AUTO: 154 K/MM3 (ref 134–434)
PMV BLD: 7.5 FL (ref 7.5–11.1)
POTASSIUM SERPLBLD-SCNC: 3.8 MMOL/L (ref 3.5–5.1)
RBC # BLD AUTO: 5.06 M/MM3 (ref 4–5.6)
SODIUM SERPL-SCNC: 141 MMOL/L (ref 136–145)
WBC # BLD AUTO: 8.6 K/MM3 (ref 4–10)

## 2019-04-10 RX ADMIN — LEVOTHYROXINE SODIUM SCH: 50 TABLET ORAL at 06:29

## 2019-04-10 RX ADMIN — DOCUSATE SODIUM SCH: 100 CAPSULE, LIQUID FILLED ORAL at 15:07

## 2019-04-10 RX ADMIN — IPRATROPIUM BROMIDE AND ALBUTEROL SULFATE SCH AMP: .5; 3 SOLUTION RESPIRATORY (INHALATION) at 08:15

## 2019-04-10 RX ADMIN — IPRATROPIUM BROMIDE AND ALBUTEROL SULFATE SCH AMP: .5; 3 SOLUTION RESPIRATORY (INHALATION) at 12:00

## 2019-04-10 RX ADMIN — METHYLPREDNISOLONE SODIUM SUCCINATE SCH MG: 40 INJECTION, POWDER, FOR SOLUTION INTRAMUSCULAR; INTRAVENOUS at 18:11

## 2019-04-10 RX ADMIN — DOCUSATE SODIUM SCH: 100 CAPSULE, LIQUID FILLED ORAL at 06:29

## 2019-04-10 RX ADMIN — METHYLPREDNISOLONE SODIUM SUCCINATE SCH MG: 40 INJECTION, POWDER, FOR SOLUTION INTRAMUSCULAR; INTRAVENOUS at 11:49

## 2019-04-10 RX ADMIN — ATORVASTATIN CALCIUM SCH MG: 10 TABLET, FILM COATED ORAL at 22:11

## 2019-04-10 RX ADMIN — TORSEMIDE SCH MG: 20 TABLET ORAL at 11:48

## 2019-04-10 RX ADMIN — DIGOXIN SCH MG: 125 TABLET ORAL at 11:48

## 2019-04-10 RX ADMIN — IPRATROPIUM BROMIDE AND ALBUTEROL SULFATE SCH AMP: .5; 3 SOLUTION RESPIRATORY (INHALATION) at 15:44

## 2019-04-10 RX ADMIN — OMEGA-3-ACID ETHYL ESTERS SCH: 1 CAPSULE, LIQUID FILLED ORAL at 22:12

## 2019-04-10 RX ADMIN — OMEGA-3-ACID ETHYL ESTERS SCH GM: 1 CAPSULE, LIQUID FILLED ORAL at 11:48

## 2019-04-10 RX ADMIN — DONEPEZIL HYDROCHLORIDE SCH MG: 10 TABLET, FILM COATED ORAL at 22:11

## 2019-04-10 RX ADMIN — PANTOPRAZOLE SODIUM SCH MG: 40 TABLET, DELAYED RELEASE ORAL at 11:48

## 2019-04-10 RX ADMIN — IPRATROPIUM BROMIDE AND ALBUTEROL SULFATE SCH AMP: .5; 3 SOLUTION RESPIRATORY (INHALATION) at 21:01

## 2019-04-10 RX ADMIN — TORSEMIDE SCH MG: 20 TABLET ORAL at 11:50

## 2019-04-10 RX ADMIN — OXYCODONE HYDROCHLORIDE AND ACETAMINOPHEN SCH MG: 500 TABLET ORAL at 11:48

## 2019-04-10 RX ADMIN — DOCUSATE SODIUM SCH: 100 CAPSULE, LIQUID FILLED ORAL at 22:11

## 2019-04-10 RX ADMIN — POLYETHYLENE GLYCOL 3350 SCH GRAMS: 17 POWDER, FOR SOLUTION ORAL at 11:49

## 2019-04-10 RX ADMIN — METHYLPREDNISOLONE SODIUM SUCCINATE SCH MG: 40 INJECTION, POWDER, FOR SOLUTION INTRAMUSCULAR; INTRAVENOUS at 02:20

## 2019-04-10 NOTE — PN
Physical Exam: 


SUBJECTIVE: Patient seen and examined


24 HR EVENTS:


-Patient scheduled for peripheral Angio with endovascular covered stent(s) 

placement for 3cm Rt popliteal artery aneurysm- now rescheduled for 4/11


-cleared by cards for interventional procedure


-eliquis on hold, heparin started 


-d/lucero from telemetry





OBJECTIVE:





 Vital Signs











 Period  Temp  Pulse  Resp  BP Sys/Gonsales  Pulse Ox


 


 Last 24 Hr  97.5 F-98.5 F  65-77  20-21  112-133/67-85  95-98











GENERAL: asleep (awakened to perform exam), in no acute distress.


HEAD: Normal with no signs of trauma.


EYES: PERRL, sclera anicteric, conjunctiva clear. + ptosis. 


ENT: nares patent, oropharynx clear without exudates, moist mucous membranes.


NECK: Trachea midline, full range of motion, supple. 


LUNGS: Breath sounds equal coarse BS, no wheezes, no crackles, no accessory 

muscle use. 


HEART: Regular rate and rhythm, S1, S2 without murmur, rub or gallop.


ABDOMEN: Soft, nontender, nondistended, normoactive bowel sounds, no guarding, 

no rebound


EXTREMITIES: 2+ pulses, warm, well-perfused, no edema. 


MSK: normal muscle strength, decreased tone


NEUROLOGICAL: forgetful, slow speech, gait not observed.


PSYCH: Normal mood, normal affect.


SKIN: Warm, dry, normal turgor, no rashes or lesions noted








 Laboratory Results - last 24 hr











  04/09/19 04/09/19 04/09/19





  05:15 05:15 05:15


 


WBC  8.1  


 


RBC  4.99  


 


Hgb  17.1 H  


 


Hct  51.5 H  


 


MCV  103.3 H  


 


MCH  34.4 H  


 


MCHC  33.3  


 


RDW  14.8  


 


Plt Count  168  


 


MPV  7.9  


 


Absolute Neuts (auto)  7.2  


 


Neutrophils %  88.4 H  


 


Lymphocytes %  5.4 L  


 


Monocytes %  6.0  


 


Eosinophils %  0.1  


 


Basophils %  0.1  


 


Nucleated RBC %  0  


 


PT with INR    12.40


 


INR    1.05


 


Sodium   136 


 


Potassium   3.7 


 


Chloride   86 L 


 


Carbon Dioxide   44 H 


 


Anion Gap   6 L 


 


BUN   85 H 


 


Creatinine   1.6 H 


 


Creat Clearance w eGFR   41.49 


 


Random Glucose   110 H 


 


Calcium   9.1 


 


Magnesium   2.5 H 


 


Total Bilirubin   1.1 H 


 


AST   21 


 


ALT   25 


 


Alkaline Phosphatase   64 


 


Total Protein   7.1 


 


Albumin   3.2 L 














  04/10/19 04/10/19





  05:30 05:30


 


WBC  8.6 


 


RBC  5.06 


 


Hgb  17.4 H 


 


Hct  51.7 H 


 


MCV  102.2 H 


 


MCH  34.3 H 


 


MCHC  33.6 


 


RDW  15.1 


 


Plt Count  154 


 


MPV  7.5 


 


Absolute Neuts (auto)  


 


Neutrophils %  


 


Lymphocytes %  


 


Monocytes %  


 


Eosinophils %  


 


Basophils %  


 


Nucleated RBC %  


 


PT with INR  


 


INR  


 


Sodium   141


 


Potassium   3.8


 


Chloride   91 L


 


Carbon Dioxide   44 H


 


Anion Gap   6 L


 


BUN   82 H


 


Creatinine   1.7 H


 


Creat Clearance w eGFR   38.68


 


Random Glucose   123 H


 


Calcium   8.9


 


Magnesium  


 


Total Bilirubin  


 


AST  


 


ALT  


 


Alkaline Phosphatase  


 


Total Protein  


 


Albumin  








Active Medications:











Generic Name Dose Route Start Last Admin





  Trade Name Freq  PRN Reason Stop Dose Admin


 


Albuterol Sulfate  1 amp  04/09/19 15:04  





  Ventolin 0.083% Nebulizer Soln -  NEB   





  Q4H PRN   





  SHORT OF BREATH/WHEEZING   





     





     





     


 


Albuterol/Ipratropium  1 amp  04/09/19 16:00  04/09/19 19:45





  Duoneb -  NEB   1 amp





  RQID GARY   Administration





     





     





     





     


 


Ascorbic Acid  500 mg  03/31/19 10:00  04/09/19 10:46





  Vitamin C -  PO   500 mg





  DAILY GARY   Administration





     





     





     





     


 


Atorvastatin Calcium  10 mg  03/31/19 22:00  04/09/19 21:02





  Lipitor -  PO   10 mg





  HS GARY   Administration





     





     





     





     


 


Carbamazepine  200 mg  03/31/19 22:00  04/09/19 21:03





  Tegretol -  PO   200 mg





  HS GARY   Administration





     





     





     





     


 


Digoxin  0.125 mg  03/31/19 10:00  04/09/19 10:46





  Lanoxin -  PO   0.125 mg





  DAILY GARY   Administration





     





     





     





     


 


Docusate Sodium  100 mg  04/06/19 14:00  04/10/19 06:29





  Colace -  PO   Not Given





  TID GARY   





     





     





     





     


 


Donepezil HCl  10 mg  03/31/19 22:00  04/09/19 21:02





  Aricept -  PO   10 mg





  HS GARY   Administration





     





     





     





     


 


Levothyroxine Sodium  50 mcg  03/31/19 07:00  04/10/19 06:29





  Synthroid -  PO   Not Given





  DAILY@0700 GARY   





     





     





     





     


 


Methylprednisolone Sodium Succinate  40 mg  04/06/19 10:00  04/10/19 02:20





  Solu-Medrol -  IVPUSH   40 mg





  Q8H-IV GARY   Administration





     





     





     





     


 


Omega-3-Acid Ethyl Esters  1 gm  03/31/19 10:00  04/09/19 21:13





  Lovaza -  PO   Not Given





  BID GARY   





     





     





     





     


 


Pantoprazole Sodium  40 mg  04/04/19 10:00  04/09/19 10:46





  Protonix -  PO   40 mg





  DAILY GARY   Administration





     





     





     





     


 


Polyethylene Glycol  17 gm  04/06/19 13:15  04/09/19 13:15





  Miralax (For Daily Use) -  PO   17 grams





  DAILY GARY   Administration





     





     





     





     


 


Senna  2 tab  04/01/19 19:55  04/07/19 23:27





  Senna -  PO   2 tab





  HS PRN   Administration





  CONSTIPATION   





     





     





     


 


Torsemide  20 mg  04/08/19 12:00  04/09/19 10:46





  Demadex -  PO   20 mg





  DAILY GARY   Administration





     





     





     





     











ASSESSMENT/PLAN:  84 year old man with a history of COPD, chronic hypoxic 

respiratory failure, pulmonary HTN, hyperlipidemia, right-sided heart failure, 

atrial fib/flutter, hypothyroidism, dementia, depression, ANNA who presented to 

the ED with SOB and leg swelling, and found to have bilateral popliteal artery 

aneurysm and COPD exacerbation. 











Problem List





- Problems


(1) Aneurysm of right popliteal artery


Assessment/Plan: 


pt scheduled for OR today, now rescheduled for 4/11


NOAC on hold


heparin started and titrated as per hospital protocol





Code(s): I72.4 - ANEURYSM OF ARTERY OF LOWER EXTREMITY   





(2) COPD with acute exacerbation


Assessment/Plan: 


-Continue SoluMedrol 40mg Q8hrs, 


- DuoNeb, albuterol as needed


- Continue oxygen to maintain saturation >90%


- Continue BiPAP overnight and as needed


Code(s): J44.1 - CHRONIC OBSTRUCTIVE PULMONARY DISEASE W (ACUTE) EXACERBATION   





(3) Dementia


Assessment/Plan: 


frequent reorientation


 Continue Aricept


Code(s): F03.90 - UNSPECIFIED DEMENTIA WITHOUT BEHAVIORAL DISTURBANCE   


Qualifiers: 


   Dementia type: unspecified type   Dementia behavioral disturbance: without 

behavioral disturbance   Qualified Code(s): F03.90 - Unspecified dementia 

without behavioral disturbance   





(4) Hyperlipidemia


Assessment/Plan: 


- Continue Lipitor, Lovaza


Code(s): E78.5 - HYPERLIPIDEMIA, UNSPECIFIED   


Qualifiers: 


   Hyperlipidemia type: unspecified   Qualified Code(s): E78.5 - Hyperlipidemia

, unspecified   





(5) Hypothyroidism


Assessment/Plan: 


-synthroid 50mcg daily


Code(s): E03.9 - HYPOTHYROIDISM, UNSPECIFIED   


Qualifiers: 


   Hypothyroidism type: unspecified   Qualified Code(s): E03.9 - Hypothyroidism

, unspecified   





(6) Obstructive sleep apnea


Assessment/Plan: 


BIPAP qhs


Code(s): G47.33 - OBSTRUCTIVE SLEEP APNEA (ADULT) (PEDIATRIC)   





(7) Systolic heart failure


Assessment/Plan: 


torsemide 20mg daily


Code(s): I50.20 - UNSPECIFIED SYSTOLIC (CONGESTIVE) HEART FAILURE   





(8) Atrial flutter


Assessment/Plan: 


- Continue Digoxin 0.125mg


 -check dig level on 4/11


- Eliquis on hold for surgery


Code(s): I48.92 - UNSPECIFIED ATRIAL FLUTTER   


Qualifiers: 


   Atrial flutter type: unspecified   Qualified Code(s): I48.92 - Unspecified 

atrial flutter   





(9) Depression


Assessment/Plan: 


- Continue Tegretol


Code(s): F32.9 - MAJOR DEPRESSIVE DISORDER, SINGLE EPISODE, UNSPECIFIED   


Qualifiers: 


   Depression Type: unspecified   Qualified Code(s): F32.9 - Major depressive 

disorder, single episode, unspecified   





Visit type





- Emergency Visit


Emergency Visit: Yes


ED Registration Date: 03/30/19


Care time: The patient presented to the Emergency Department on the above date 

and was hospitalized for further evaluation of their emergent condition.





- New Patient


This patient is new to me today: No





- Critical Care


Critical Care patient: No





- Discharge Referral


Referred to [facility] No

## 2019-04-10 NOTE — PN
Progress Note (short form)





- Note


Progress Note: 





s: sob stable, no cp, palps, dizziness


  Current Medications





Albuterol Sulfate (Ventolin 0.083% Nebulizer Soln -)  1 amp NEB Q4H PRN


   PRN Reason: SHORT OF BREATH/WHEEZING


Albuterol/Ipratropium (Duoneb -)  1 amp NEB RQID [facility]


   Last Admin: 04/10/19 08:15 Dose:  1 amp


Ascorbic Acid (Vitamin C -)  500 mg PO DAILY [facility]


   Last Admin: 04/09/19 10:46 Dose:  500 mg


Atorvastatin Calcium (Lipitor -)  10 mg PO HS [facility]


   Last Admin: 04/09/19 21:02 Dose:  10 mg


Carbamazepine (Tegretol -)  200 mg PO HS [facility]


   Last Admin: 04/09/19 21:03 Dose:  200 mg


Digoxin (Lanoxin -)  0.125 mg PO DAILY [facility]


   Last Admin: 04/09/19 10:46 Dose:  0.125 mg


Docusate Sodium (Colace -)  100 mg PO TID [facility]


   Last Admin: 04/10/19 06:29 Dose:  Not Given


Donepezil HCl (Aricept -)  10 mg PO HS [facility]


   Last Admin: 04/09/19 21:02 Dose:  10 mg


Levothyroxine Sodium (Synthroid -)  50 mcg PO DAILY@0700 [facility]


   Last Admin: 04/10/19 06:29 Dose:  Not Given


Methylprednisolone Sodium Succinate (Solu-Medrol -)  40 mg IVPUSH Q8H-IV [facility]


   Last Admin: 04/10/19 02:20 Dose:  40 mg


Omega-3-Acid Ethyl Esters (Lovaza -)  1 gm PO BID [facility]


   Last Admin: 04/09/19 21:13 Dose:  Not Given


Pantoprazole Sodium (Protonix -)  40 mg PO DAILY [facility]


   Last Admin: 04/09/19 10:46 Dose:  40 mg


Polyethylene Glycol (Miralax (For Daily Use) -)  17 gm PO DAILY [facility]


   Last Admin: 04/09/19 13:15 Dose:  17 grams


Senna (Senna -)  2 tab PO HS PRN


   PRN Reason: CONSTIPATION


   Last Admin: 04/07/19 23:27 Dose:  2 tab


Torsemide (Demadex -)  20 mg PO DAILY [facility]


   Last Admin: 04/09/19 10:46 Dose:  20 mg











  Vital Signs











 Period  Temp  Pulse  Resp  BP Sys/Gonsales  Pulse Ox


 


 Last 24 Hr  97.3 F-98.1 F  69-77  18-20  112-133/67-85  95-98














Constitutional: Yes: No Distress, Calm


Eyes: No: Sclera Icterus


HENT: No: Nasal Congestion


Cardiovascular: Yes: Pulse Irregular, S1, S2, Other (PMI non diplaced).  No: JVD

, Gallop, Murmur


Respiratory: Yes: CTA Bilaterally (very decr diffusely).  No: Accessory Muscle 

Use, Rales, Wheezes


Gastrointestinal: Yes: Normal Bowel Sounds, Soft.  No: Tenderness


Musculoskeletal: Yes: Other (No kyphosis)


Extremities: No: Cold, Cyanosis


Edema: No


Integumentary: No: Jaundice


Neurological: Yes: Alert.  No: Seizure


Psychiatric: No: Agitated








Assessment/Plan








Pharm nuc stress 2/18/16: Afib with RVR. Asx. No ischemic changes. Nl perf. Nl 

EF. 





CXR: no congestion





CT chest: extensive COPD changes





EKG: sinus, PACs, afib





RLE ultrasound, no DVT, R popliteal artery aneurysm





Echo 4/2019 low nl LV function, mildly reduced RV function, mildly dilated RA, 

mild MAC, mod TR, PASP at least 70 mmHg, mild AR, borderline aortic root 

dilation





tele: satr flutter, HRs good, richard o/n








a/p:


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 

on coronary calcifications on chest CT, stable aortic dilation of the ascending 

and abdominal aorta, HTN/HL, remote history of DVT, afib on eliquis p/w SOB, 

cough.





SOB, cough, COPD


- CT with significant findings for COPD, receiving nebs and steroids per primary

, pulm





acute Right heart failure, pulm HTN


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


- on home O2, follows with dr navarro for tx of sleep apnea


- BNP >11,000 


- echo shows severe pulm HTN, low normal RV function--stable long time


- received lasix 40 mg IV x 1, now on 80 mg IV BID - has abd distension, which 

he has had in the past with volume overload


4/6: vol status improved, cr rising, will change to oral torsemide now. 


4/7: cont torsemide 40 bid, monitor Cr, lytes, daily standing weights.


-4/8: bun/creat trending up, weights very variable, neck veins flat--pt likely 

overdiuresed/hypovolemic. change torsemide 40 bid to 20 qd, observe for LE 

edema (reliably his HF sx).


-4/9-10: BUN/Cr stable continue torsemide 20 mg daliy





Atrial fibrillation. 


-rate ok, cont digoxin as doing (level good)


-on metoprolol at home as well, which he has tolerated well--held here and HR 

good, observe. defer CCB to avoid exacerbating RV contractility issues


-hold eliquis in anticipation of vascular surgery aneurysm endovascular repair. 

would start UFH gtt in 48 hrs if surgery not imminent (not sooner, given pt's 

reduced GFR and usual half-life of eliquis) - last dose 4/7 PM 


- OR planned for 4/11 now - will start heparin gtt


- dc tele





popliteal artery aneurysm, preop CV eval:


- vascular consulted, Dr. Taylor


- no cardiac contraindications to planned endovascular covered stent placement 

for R popliteal artery aneurysm. HF is resolved and well-controlled/optimized 

medically





HL: 


- cont statin

## 2019-04-10 NOTE — PN
Progress Note, Physician


History of Present Illness: 





PULMONARY





AWAKE, ON BIPAP,COMFORTABLE





- Current Medication List


Current Medications: 


Active Medications





Albuterol Sulfate (Ventolin 0.083% Nebulizer Soln -)  1 amp NEB Q4H PRN


   PRN Reason: SHORT OF BREATH/WHEEZING


Albuterol/Ipratropium (Duoneb -)  1 amp NEB RQID [facility]


   Last Admin: 04/10/19 08:15 Dose:  1 amp


Ascorbic Acid (Vitamin C -)  500 mg PO DAILY [facility]


   Last Admin: 04/09/19 10:46 Dose:  500 mg


Atorvastatin Calcium (Lipitor -)  10 mg PO HS [facility]


   Last Admin: 04/09/19 21:02 Dose:  10 mg


Carbamazepine (Tegretol -)  200 mg PO HS [facility]


   Last Admin: 04/09/19 21:03 Dose:  200 mg


Digoxin (Lanoxin -)  0.125 mg PO DAILY [facility]


   Last Admin: 04/09/19 10:46 Dose:  0.125 mg


Docusate Sodium (Colace -)  100 mg PO TID [facility]


   Last Admin: 04/10/19 06:29 Dose:  Not Given


Donepezil HCl (Aricept -)  10 mg PO HS [facility]


   Last Admin: 04/09/19 21:02 Dose:  10 mg


Heparin Sodium (Porcine) (Heparin -)  1,000 unit IVPUSH PRN PRN


   PRN Reason: Heparin


Heparin Sodium (Porcine) (Heparin -)  5,000 unit IVPUSH PRN PRN


   PRN Reason: Heparin


Heparin Sodium (Porcine) 25, (000 unit/ Sodium Chloride)  500 mls @ 20 mls/hr 

IV TITR GARY; Protocol


Levothyroxine Sodium (Synthroid -)  50 mcg PO DAILY@0700 [facility]


   Last Admin: 04/10/19 06:29 Dose:  Not Given


Methylprednisolone Sodium Succinate (Solu-Medrol -)  40 mg IVPUSH Q8H-IV [facility]


   Last Admin: 04/10/19 02:20 Dose:  40 mg


Omega-3-Acid Ethyl Esters (Lovaza -)  1 gm PO BID [facility]


   Last Admin: 04/09/19 21:13 Dose:  Not Given


Pantoprazole Sodium (Protonix -)  40 mg PO DAILY [facility]


   Last Admin: 04/09/19 10:46 Dose:  40 mg


Polyethylene Glycol (Miralax (For Daily Use) -)  17 gm PO DAILY [facility]


   Last Admin: 04/09/19 13:15 Dose:  17 grams


Senna (Senna -)  2 tab PO HS PRN


   PRN Reason: CONSTIPATION


   Last Admin: 04/07/19 23:27 Dose:  2 tab


Torsemide (Demadex -)  20 mg PO DAILY [facility]


   Last Admin: 04/09/19 10:46 Dose:  20 mg











- Objective


Vital Signs: 


 Vital Signs











Temperature  97.3 F L  04/10/19 08:24


 


Pulse Rate  72   04/10/19 08:24


 


Respiratory Rate  18   04/10/19 08:24


 


Blood Pressure  113/71   04/10/19 08:24


 


O2 Sat by Pulse Oximetry (%)  96   04/10/19 08:14











Constitutional: Yes: Well Nourished, Calm


Eyes: Yes: WNL


HENT: Yes: WNL


Neck: Yes: WNL


Cardiovascular: Yes: Pulse Irregular, S1, S2


Respiratory: Yes: On BiPap, Rhonchi (few rhonchi)


Gastrointestinal: Yes: Normal Bowel Sounds, Soft


Extremities: Yes: WNL


Edema: No


Labs: 


 CBC, BMP





 04/10/19 05:30 





 04/10/19 05:30 





 INR, PTT











INR  1.05  (0.83-1.09)   04/09/19  05:15    














Assessment/Plan





Problem List





- Problems


(1) Community acquired pneumonia


Code(s): J18.9 - PNEUMONIA, UNSPECIFIED ORGANISM   





(2) Acute respiratory acidosis


Code(s): E87.2 - ACIDOSIS   





(3) Acute respiratory failure with hypoxia and hypercarbia


Code(s): J96.01 - ACUTE RESPIRATORY FAILURE WITH HYPOXIA; J96.02 - ACUTE 

RESPIRATORY FAILURE WITH HYPERCAPNIA   





(4) Aneurysm of right popliteal artery


Code(s): I72.4 - ANEURYSM OF ARTERY OF LOWER EXTREMITY   





(5) COPD with acute exacerbation


Code(s): J44.1 - CHRONIC OBSTRUCTIVE PULMONARY DISEASE W (ACUTE) EXACERBATION   





(6) Chronic respiratory acidosis


Code(s): E87.2 - ACIDOSIS   





(7) Leg edema, right


Code(s): R60.0 - LOCALIZED EDEMA   





(8) Right ventricular systolic dysfunction


Code(s): I51.9 - HEART DISEASE, UNSPECIFIED   





(9) Atrial flutter


Code(s): I48.92 - UNSPECIFIED ATRIAL FLUTTER   


Qualifiers: 


   Atrial flutter type: unspecified   Qualified Code(s): I48.92 - Unspecified 

atrial flutter   





(10) COPD (chronic obstructive pulmonary disease)


Code(s): J44.9 - CHRONIC OBSTRUCTIVE PULMONARY DISEASE, UNSPECIFIED   


Qualifiers: 


   COPD type: COPD with acute exacerbation   Qualified Code(s): J44.1 - Chronic 

obstructive pulmonary disease with (acute) exacerbation   





(11) Chronic cor pulmonale


Code(s): I27.81 - COR PULMONALE (CHRONIC)   





(12) Dementia


Code(s): F03.90 - UNSPECIFIED DEMENTIA WITHOUT BEHAVIORAL DISTURBANCE   


Qualifiers: 


   Dementia type: unspecified type   Dementia behavioral disturbance: without 

behavioral disturbance   Qualified Code(s): F03.90 - Unspecified dementia 

without behavioral disturbance   





(13) Hyperlipidemia


Code(s): E78.5 - HYPERLIPIDEMIA, UNSPECIFIED   


Qualifiers: 


   Hyperlipidemia type: unspecified   Qualified Code(s): E78.5 - Hyperlipidemia

, unspecified   





(14) Hypertension


Code(s): I10 - ESSENTIAL (PRIMARY) HYPERTENSION   


Qualifiers: 


   Hypertension type: essential hypertension   Qualified Code(s): I10 - 

Essential (primary) hypertension   





(15) Hypothyroidism


Code(s): E03.9 - HYPOTHYROIDISM, UNSPECIFIED   


Qualifiers: 


   Hypothyroidism type: unspecified   Qualified Code(s): E03.9 - Hypothyroidism

, unspecified   





(16) Obstructive sleep apnea


Code(s): G47.33 - OBSTRUCTIVE SLEEP APNEA (ADULT) (PEDIATRIC)   





(17) Pulmonary hypertension


Code(s): I27.2 - OTHER SECONDARY PULMONARY HYPERTENSION * DO NOT USE *   





Assessment/Plan


RLL PNA 


SEVERE PULMONARY HTN


AFIB


COPD


CHRONIC HYPOXEMIC RESPIRATORY FAILURE


OSAS


POPLITEAL ANEURYSM








Steroids


NC O2 as tolerated 


lasix


NIPPV QHS and PRN 


No smoking discussed


AC 


BD TX standing and PRN 


monitor pulse ox


no pulmonary contraindications for endovascular repair endovascular repair 











DR EDWARDS











Problem List





- Problems


(1) Community acquired pneumonia


Code(s): J18.9 - PNEUMONIA, UNSPECIFIED ORGANISM   





(2) Acute respiratory acidosis


Code(s): E87.2 - ACIDOSIS   





(3) Acute respiratory failure with hypoxia and hypercarbia


Code(s): J96.01 - ACUTE RESPIRATORY FAILURE WITH HYPOXIA; J96.02 - ACUTE 

RESPIRATORY FAILURE WITH HYPERCAPNIA   





(4) Aneurysm of right popliteal artery


Code(s): I72.4 - ANEURYSM OF ARTERY OF LOWER EXTREMITY   





(5) COPD with acute exacerbation


Code(s): J44.1 - CHRONIC OBSTRUCTIVE PULMONARY DISEASE W (ACUTE) EXACERBATION   





(6) Chronic respiratory acidosis


Code(s): E87.2 - ACIDOSIS   





(7) Leg edema, right


Code(s): R60.0 - LOCALIZED EDEMA   





(8) Right ventricular systolic dysfunction


Code(s): I51.9 - HEART DISEASE, UNSPECIFIED   





(9) Atrial flutter


Code(s): I48.92 - UNSPECIFIED ATRIAL FLUTTER   


Qualifiers: 


   Atrial flutter type: unspecified   Qualified Code(s): I48.92 - Unspecified 

atrial flutter   





(10) COPD (chronic obstructive pulmonary disease)


Code(s): J44.9 - CHRONIC OBSTRUCTIVE PULMONARY DISEASE, UNSPECIFIED   


Qualifiers: 


   COPD type: COPD with acute exacerbation   Qualified Code(s): J44.1 - Chronic 

obstructive pulmonary disease with (acute) exacerbation   





(11) Chronic cor pulmonale


Code(s): I27.81 - COR PULMONALE (CHRONIC)   





(12) Dementia


Code(s): F03.90 - UNSPECIFIED DEMENTIA WITHOUT BEHAVIORAL DISTURBANCE   


Qualifiers: 


   Dementia type: unspecified type   Dementia behavioral disturbance: without 

behavioral disturbance   Qualified Code(s): F03.90 - Unspecified dementia 

without behavioral disturbance   





(13) Hyperlipidemia


Code(s): E78.5 - HYPERLIPIDEMIA, UNSPECIFIED   


Qualifiers: 


   Hyperlipidemia type: unspecified   Qualified Code(s): E78.5 - Hyperlipidemia

, unspecified   





(14) Hypertension


Code(s): I10 - ESSENTIAL (PRIMARY) HYPERTENSION   


Qualifiers: 


   Hypertension type: essential hypertension   Qualified Code(s): I10 - 

Essential (primary) hypertension   





(15) Hypothyroidism


Code(s): E03.9 - HYPOTHYROIDISM, UNSPECIFIED   


Qualifiers: 


   Hypothyroidism type: unspecified   Qualified Code(s): E03.9 - Hypothyroidism

, unspecified   





(16) Obstructive sleep apnea


Code(s): G47.33 - OBSTRUCTIVE SLEEP APNEA (ADULT) (PEDIATRIC)   





(17) Pulmonary hypertension


Code(s): I27.2 - OTHER SECONDARY PULMONARY HYPERTENSION * DO NOT USE *

## 2019-04-11 LAB
ALBUMIN SERPL-MCNC: 3.1 G/DL (ref 3.4–5)
ALP SERPL-CCNC: 66 U/L (ref 45–117)
ALT SERPL-CCNC: 30 U/L (ref 13–61)
ANION GAP SERPL CALC-SCNC: 9 MMOL/L (ref 8–16)
AST SERPL-CCNC: 26 U/L (ref 15–37)
BILIRUB SERPL-MCNC: 1.2 MG/DL (ref 0.2–1)
BUN SERPL-MCNC: 75 MG/DL (ref 7–18)
CALCIUM SERPL-MCNC: 9.1 MG/DL (ref 8.5–10.1)
CHLORIDE SERPL-SCNC: 88 MMOL/L (ref 98–107)
CO2 SERPL-SCNC: 42 MMOL/L (ref 21–32)
CREAT SERPL-MCNC: 1.7 MG/DL (ref 0.55–1.3)
DEPRECATED RDW RBC AUTO: 14.4 % (ref 11.9–15.9)
GLUCOSE SERPL-MCNC: 126 MG/DL (ref 74–106)
HCT VFR BLD CALC: 51 % (ref 35.4–49)
HGB BLD-MCNC: 16.9 GM/DL (ref 11.7–16.9)
MCH RBC QN AUTO: 33.9 PG (ref 25.7–33.7)
MCHC RBC AUTO-ENTMCNC: 33 G/DL (ref 32–35.9)
MCV RBC: 102.8 FL (ref 80–96)
PLATELET # BLD AUTO: 146 K/MM3 (ref 134–434)
PMV BLD: 7.6 FL (ref 7.5–11.1)
POTASSIUM SERPLBLD-SCNC: 3.5 MMOL/L (ref 3.5–5.1)
PROT SERPL-MCNC: 7 G/DL (ref 6.4–8.2)
RBC # BLD AUTO: 4.96 M/MM3 (ref 4–5.6)
SODIUM SERPL-SCNC: 138 MMOL/L (ref 136–145)
WBC # BLD AUTO: 8.4 K/MM3 (ref 4–10)

## 2019-04-11 PROCEDURE — B40DYZZ PLAIN RADIOGRAPHY OF AORTA AND BILATERAL LOWER EXTREMITY ARTERIES USING OTHER CONTRAST: ICD-10-PCS | Performed by: SURGERY

## 2019-04-11 PROCEDURE — 047M34Z DILATION OF RIGHT POPLITEAL ARTERY WITH DRUG-ELUTING INTRALUMINAL DEVICE, PERCUTANEOUS APPROACH: ICD-10-PCS | Performed by: SURGERY

## 2019-04-11 PROCEDURE — B40FYZZ PLAIN RADIOGRAPHY OF RIGHT LOWER EXTREMITY ARTERIES USING OTHER CONTRAST: ICD-10-PCS | Performed by: SURGERY

## 2019-04-11 RX ADMIN — OMEGA-3-ACID ETHYL ESTERS SCH GM: 1 CAPSULE, LIQUID FILLED ORAL at 21:29

## 2019-04-11 RX ADMIN — OMEGA-3-ACID ETHYL ESTERS SCH: 1 CAPSULE, LIQUID FILLED ORAL at 22:13

## 2019-04-11 RX ADMIN — DOCUSATE SODIUM SCH MG: 100 CAPSULE, LIQUID FILLED ORAL at 21:29

## 2019-04-11 RX ADMIN — ATORVASTATIN CALCIUM SCH: 10 TABLET, FILM COATED ORAL at 22:13

## 2019-04-11 RX ADMIN — APIXABAN SCH MG: 2.5 TABLET, FILM COATED ORAL at 21:29

## 2019-04-11 RX ADMIN — IPRATROPIUM BROMIDE AND ALBUTEROL SULFATE SCH AMP: .5; 3 SOLUTION RESPIRATORY (INHALATION) at 21:20

## 2019-04-11 RX ADMIN — DONEPEZIL HYDROCHLORIDE SCH: 10 TABLET, FILM COATED ORAL at 22:13

## 2019-04-11 RX ADMIN — APIXABAN SCH: 2.5 TABLET, FILM COATED ORAL at 22:13

## 2019-04-11 RX ADMIN — DOCUSATE SODIUM SCH: 100 CAPSULE, LIQUID FILLED ORAL at 06:52

## 2019-04-11 RX ADMIN — IPRATROPIUM BROMIDE AND ALBUTEROL SULFATE SCH: .5; 3 SOLUTION RESPIRATORY (INHALATION) at 11:30

## 2019-04-11 RX ADMIN — DOCUSATE SODIUM SCH MG: 100 CAPSULE, LIQUID FILLED ORAL at 14:22

## 2019-04-11 RX ADMIN — POLYETHYLENE GLYCOL 3350 SCH: 17 POWDER, FOR SOLUTION ORAL at 09:55

## 2019-04-11 RX ADMIN — METHYLPREDNISOLONE SODIUM SUCCINATE SCH MG: 40 INJECTION, POWDER, FOR SOLUTION INTRAMUSCULAR; INTRAVENOUS at 21:29

## 2019-04-11 RX ADMIN — SODIUM CHLORIDE, POTASSIUM CHLORIDE, SODIUM LACTATE AND CALCIUM CHLORIDE SCH: 600; 310; 30; 20 INJECTION, SOLUTION INTRAVENOUS at 14:22

## 2019-04-11 RX ADMIN — DOCUSATE SODIUM SCH: 100 CAPSULE, LIQUID FILLED ORAL at 22:13

## 2019-04-11 RX ADMIN — APIXABAN SCH MG: 2.5 TABLET, FILM COATED ORAL at 14:22

## 2019-04-11 RX ADMIN — METHYLPREDNISOLONE SODIUM SUCCINATE SCH MG: 40 INJECTION, POWDER, FOR SOLUTION INTRAMUSCULAR; INTRAVENOUS at 09:55

## 2019-04-11 RX ADMIN — IPRATROPIUM BROMIDE AND ALBUTEROL SULFATE SCH AMP: .5; 3 SOLUTION RESPIRATORY (INHALATION) at 15:58

## 2019-04-11 RX ADMIN — ATORVASTATIN CALCIUM SCH MG: 10 TABLET, FILM COATED ORAL at 21:30

## 2019-04-11 RX ADMIN — IPRATROPIUM BROMIDE AND ALBUTEROL SULFATE SCH AMP: .5; 3 SOLUTION RESPIRATORY (INHALATION) at 07:15

## 2019-04-11 RX ADMIN — LEVOTHYROXINE SODIUM SCH MCG: 50 TABLET ORAL at 06:52

## 2019-04-11 RX ADMIN — DONEPEZIL HYDROCHLORIDE SCH MG: 10 TABLET, FILM COATED ORAL at 21:29

## 2019-04-11 RX ADMIN — DIGOXIN SCH MG: 125 TABLET ORAL at 09:53

## 2019-04-11 RX ADMIN — PANTOPRAZOLE SODIUM SCH: 40 TABLET, DELAYED RELEASE ORAL at 09:55

## 2019-04-11 RX ADMIN — METHYLPREDNISOLONE SODIUM SUCCINATE SCH MG: 40 INJECTION, POWDER, FOR SOLUTION INTRAMUSCULAR; INTRAVENOUS at 02:49

## 2019-04-11 RX ADMIN — TORSEMIDE SCH MG: 20 TABLET ORAL at 09:54

## 2019-04-11 RX ADMIN — OMEGA-3-ACID ETHYL ESTERS SCH: 1 CAPSULE, LIQUID FILLED ORAL at 09:55

## 2019-04-11 NOTE — PN
Physical Exam: 


SUBJECTIVE: Patient seen and examined


24 HR EVENTS


-PT for peripheral angio of right popliteal artery aneurysm today


-pt has no complaints post-op





OBJECTIVE:





 Vital Signs











 Period  Temp  Pulse  Resp  BP Sys/Gonsales  Pulse Ox


 


 Last 24 Hr  97.3 F-97.9 F  57-80  18-20  113-142/71-82  93-98











GENERAL: awake, somnolent, in no acute distress.


HEAD: Normal with no signs of trauma.


EYES: PERRL, sclera anicteric, conjunctiva clear. + ptosis. 


ENT: nares patent, oropharynx clear without exudates, moist mucous membranes.


NECK: Trachea midline, full range of motion, supple. 


LUNGS: Breath sounds equal coarse BS, no wheezes, no crackles, no accessory 

muscle use. 


HEART: Regular rate and rhythm, S1, S2 without murmur, rub or gallop.


ABDOMEN: Soft, nontender, nondistended, normoactive bowel sounds, no guarding, 

no rebound


EXTREMITIES: Left groin with clean dry dressing, +2 femoral b/l, +1 Left DP/PT, 

+1 Right PT/non-palp DP.  warm, well-perfused, no edema. 


MSK: normal muscle strength, decreased tone


NEUROLOGICAL: forgetful, slow speech, 


PSYCH: Normal mood, normal affect.


SKIN: Warm, dry, normal turgor, no rashes or lesions noted








 Laboratory Results - last 24 hr











  04/10/19





  20:10


 


PTT (Actin FS)  58.9 H








Active Medications











Generic Name Dose Route Start Last Admin





  Trade Name Freq  PRN Reason Stop Dose Admin


 


Albuterol Sulfate  1 amp  04/09/19 15:04  





  Ventolin 0.083% Nebulizer Soln -  NEB   





  Q4H PRN   





  SHORT OF BREATH/WHEEZING   





     





     





     


 


Albuterol/Ipratropium  1 amp  04/09/19 16:00  04/10/19 21:01





  Duoneb -  NEB   1 amp





  RQID GARY   Administration





     





     





     





     


 


Ascorbic Acid  500 mg  03/31/19 10:00  04/10/19 11:48





  Vitamin C -  PO   500 mg





  DAILY GARY   Administration





     





     





     





     


 


Atorvastatin Calcium  10 mg  03/31/19 22:00  04/10/19 22:11





  Lipitor -  PO   10 mg





  HS GARY   Administration





     





     





     





     


 


Carbamazepine  200 mg  03/31/19 22:00  04/10/19 22:12





  Tegretol -  PO   200 mg





  HS GARY   Administration





     





     





     





     


 


Digoxin  0.125 mg  03/31/19 10:00  04/10/19 11:48





  Lanoxin -  PO   0.125 mg





  DAILY GARY   Administration





     





     





     





     


 


Docusate Sodium  100 mg  04/06/19 14:00  04/11/19 06:52





  Colace -  PO   Not Given





  TID GARY   





     





     





     





     


 


Donepezil HCl  10 mg  03/31/19 22:00  04/10/19 22:11





  Aricept -  PO   10 mg





  HS GARY   Administration





     





     





     





     


 


Heparin Sodium (Porcine)  1,000 unit  04/10/19 11:02  





  Heparin -  IVPUSH   





  PRN PRN   





  Heparin   





     





     





     


 


Heparin Sodium (Porcine)  5,000 unit  04/10/19 10:57  





  Heparin -  IVPUSH   





  PRN PRN   





  Heparin   





     





     





     


 


Heparin Sodium (Porcine) 25,  500 mls @ 20 mls/hr  04/10/19 11:00  04/10/19 13:

39





  000 unit/ Sodium Chloride  IV   1,000 unit/hr





  TITR GARY   20 mls/hr





     Administration





     





  Protocol   





  1,000 UNIT/HR   


 


Levothyroxine Sodium  50 mcg  03/31/19 07:00  04/11/19 06:52





  Synthroid -  PO   50 mcg





  DAILY@0700 GARY   Administration





     





     





     





     


 


Methylprednisolone Sodium Succinate  40 mg  04/06/19 10:00  04/11/19 02:49





  Solu-Medrol -  IVPUSH   40 mg





  Q8H-IV GARY   Administration





     





     





     





     


 


Omega-3-Acid Ethyl Esters  1 gm  03/31/19 10:00  04/10/19 22:12





  Lovaza -  PO   Not Given





  BID GARY   





     





     





     





     


 


Pantoprazole Sodium  40 mg  04/04/19 10:00  04/10/19 11:48





  Protonix -  PO   40 mg





  DAILY GARY   Administration





     





     





     





     


 


Polyethylene Glycol  17 gm  04/06/19 13:15  04/10/19 11:49





  Miralax (For Daily Use) -  PO   17 grams





  DAILY GARY   Administration





     





     





     





     


 


Senna  2 tab  04/01/19 19:55  04/07/19 23:27





  Senna -  PO   2 tab





  HS PRN   Administration





  CONSTIPATION   





     





     





     


 


Torsemide  20 mg  04/08/19 12:00  04/10/19 11:50





  Demadex -  PO   20 mg





  DAILY GARY   Administration





     





     





     





     











ASSESSMENT/PLAN: 84 year old man with a history of COPD, chronic hypoxic 

respiratory failure, pulmonary HTN, hyperlipidemia, right-sided heart failure, 

atrial fib/flutter, hypothyroidism, dementia, depression, ANNA who presented to 

the ED with SOB and leg swelling, and found to have bilateral popliteal artery 

aneurysm and COPD exacerbation. COPD exacerbation has improved after intensive 

management and pt is now s/p Right popliteal artery stent graft with 

angioplasty. 

















Problem List





- Problems


(1) Aneurysm of right popliteal artery


Assessment/Plan: 


s/p Right popliteal artery stent graft with angioplasty


eliquis restarted post-op








Code(s): I72.4 - ANEURYSM OF ARTERY OF LOWER EXTREMITY   





(2) COPD with acute exacerbation


Assessment/Plan: 


-SoluMedrol 40mg decreased to BID


- DuoNeb, albuterol as needed


- Continue oxygen to maintain saturation >90%


- Continue BiPAP overnight and as needed


Code(s): J44.1 - CHRONIC OBSTRUCTIVE PULMONARY DISEASE W (ACUTE) EXACERBATION   





(3) Dementia


Assessment/Plan: 


frequent reorientation


 Continue Aricept


Code(s): F03.90 - UNSPECIFIED DEMENTIA WITHOUT BEHAVIORAL DISTURBANCE   


Qualifiers: 


   Dementia type: unspecified type   Dementia behavioral disturbance: without 

behavioral disturbance   Qualified Code(s): F03.90 - Unspecified dementia 

without behavioral disturbance   





(4) Hyperlipidemia


Assessment/Plan: 


- Continue Lipitor, Lovaza


Code(s): E78.5 - HYPERLIPIDEMIA, UNSPECIFIED   


Qualifiers: 


   Hyperlipidemia type: unspecified   Qualified Code(s): E78.5 - Hyperlipidemia

, unspecified   





(5) Hypothyroidism


Assessment/Plan: 


-synthroid 50mcg daily


Code(s): E03.9 - HYPOTHYROIDISM, UNSPECIFIED   


Qualifiers: 


   Hypothyroidism type: unspecified   Qualified Code(s): E03.9 - Hypothyroidism

, unspecified   





(6) Obstructive sleep apnea


Assessment/Plan: 


BIPAP qhs


Code(s): G47.33 - OBSTRUCTIVE SLEEP APNEA (ADULT) (PEDIATRIC)   





(7) Systolic heart failure


Assessment/Plan: 


torsemide 20mg daily


Code(s): I50.20 - UNSPECIFIED SYSTOLIC (CONGESTIVE) HEART FAILURE   





(8) Atrial flutter


Assessment/Plan: 


-Continue Digoxin 0.125mg


 -therapeutic dig level on 4/11





Code(s): I48.92 - UNSPECIFIED ATRIAL FLUTTER   


Qualifiers: 


   Atrial flutter type: unspecified   Qualified Code(s): I48.92 - Unspecified 

atrial flutter   





(9) Depression


Assessment/Plan: 


- Continue Tegretol


Code(s): F32.9 - MAJOR DEPRESSIVE DISORDER, SINGLE EPISODE, UNSPECIFIED   


Qualifiers: 


   Depression Type: unspecified   Qualified Code(s): F32.9 - Major depressive 

disorder, single episode, unspecified   





Visit type





- Emergency Visit


Emergency Visit: Yes


ED Registration Date: 03/30/19


Care time: The patient presented to the Emergency Department on the above date 

and was hospitalized for further evaluation of their emergent condition.





- New Patient


This patient is new to me today: No





- Critical Care


Critical Care patient: No





- Discharge Referral


Referred to [facility] No

## 2019-04-11 NOTE — OP
Operative Note





- Note:


Operative Date: 04/11/19


Pre-Operative Diagnosis: right poplteal Artery Aneruysm


Operation: Aortogram, RLE angiogram, Right popliteal artery stent graft with 

angioplasty


Findings: 





3.3 cm popliteal artery aneurysm 


Post-Operative Diagnosis: Same as Pre-op


Anesthesia: Fractional


Estimated Blood Loss (mls): 50


Operative Report Dictated: Yes

## 2019-04-12 LAB
ALBUMIN SERPL-MCNC: 2.9 G/DL (ref 3.4–5)
ALP SERPL-CCNC: 60 U/L (ref 45–117)
ALT SERPL-CCNC: 23 U/L (ref 13–61)
ANION GAP SERPL CALC-SCNC: 4 MMOL/L (ref 8–16)
AST SERPL-CCNC: 28 U/L (ref 15–37)
BILIRUB SERPL-MCNC: 1.3 MG/DL (ref 0.2–1)
BUN SERPL-MCNC: 72 MG/DL (ref 7–18)
CALCIUM SERPL-MCNC: 8.8 MG/DL (ref 8.5–10.1)
CHLORIDE SERPL-SCNC: 93 MMOL/L (ref 98–107)
CO2 SERPL-SCNC: 41 MMOL/L (ref 21–32)
CREAT SERPL-MCNC: 1.6 MG/DL (ref 0.55–1.3)
DEPRECATED RDW RBC AUTO: 14.7 % (ref 11.9–15.9)
GLUCOSE SERPL-MCNC: 107 MG/DL (ref 74–106)
HCT VFR BLD CALC: 48.6 % (ref 35.4–49)
HGB BLD-MCNC: 16 GM/DL (ref 11.7–16.9)
MCH RBC QN AUTO: 34 PG (ref 25.7–33.7)
MCHC RBC AUTO-ENTMCNC: 33 G/DL (ref 32–35.9)
MCV RBC: 103.1 FL (ref 80–96)
PLATELET # BLD AUTO: 143 K/MM3 (ref 134–434)
PMV BLD: 7.9 FL (ref 7.5–11.1)
POTASSIUM SERPLBLD-SCNC: 4.5 MMOL/L (ref 3.5–5.1)
PROT SERPL-MCNC: 6.1 G/DL (ref 6.4–8.2)
RBC # BLD AUTO: 4.71 M/MM3 (ref 4–5.6)
SODIUM SERPL-SCNC: 139 MMOL/L (ref 136–145)
WBC # BLD AUTO: 8.8 K/MM3 (ref 4–10)

## 2019-04-12 RX ADMIN — APIXABAN SCH MG: 2.5 TABLET, FILM COATED ORAL at 22:24

## 2019-04-12 RX ADMIN — METHYLPREDNISOLONE SODIUM SUCCINATE SCH MG: 40 INJECTION, POWDER, FOR SOLUTION INTRAMUSCULAR; INTRAVENOUS at 22:24

## 2019-04-12 RX ADMIN — OXYCODONE HYDROCHLORIDE AND ACETAMINOPHEN SCH MG: 500 TABLET ORAL at 10:30

## 2019-04-12 RX ADMIN — DOCUSATE SODIUM SCH MG: 100 CAPSULE, LIQUID FILLED ORAL at 22:24

## 2019-04-12 RX ADMIN — DIGOXIN SCH MG: 125 TABLET ORAL at 10:30

## 2019-04-12 RX ADMIN — DOCUSATE SODIUM SCH MG: 100 CAPSULE, LIQUID FILLED ORAL at 13:41

## 2019-04-12 RX ADMIN — IPRATROPIUM BROMIDE AND ALBUTEROL SULFATE SCH AMP: .5; 3 SOLUTION RESPIRATORY (INHALATION) at 20:50

## 2019-04-12 RX ADMIN — IPRATROPIUM BROMIDE AND ALBUTEROL SULFATE SCH AMP: .5; 3 SOLUTION RESPIRATORY (INHALATION) at 15:33

## 2019-04-12 RX ADMIN — POLYETHYLENE GLYCOL 3350 SCH GM: 17 POWDER, FOR SOLUTION ORAL at 13:39

## 2019-04-12 RX ADMIN — IPRATROPIUM BROMIDE AND ALBUTEROL SULFATE SCH AMP: .5; 3 SOLUTION RESPIRATORY (INHALATION) at 11:31

## 2019-04-12 RX ADMIN — OMEGA-3-ACID ETHYL ESTERS SCH GM: 1 CAPSULE, LIQUID FILLED ORAL at 10:31

## 2019-04-12 RX ADMIN — IPRATROPIUM BROMIDE AND ALBUTEROL SULFATE SCH AMP: .5; 3 SOLUTION RESPIRATORY (INHALATION) at 08:39

## 2019-04-12 RX ADMIN — LEVOTHYROXINE SODIUM SCH MCG: 50 TABLET ORAL at 06:14

## 2019-04-12 RX ADMIN — OMEGA-3-ACID ETHYL ESTERS SCH GM: 1 CAPSULE, LIQUID FILLED ORAL at 22:24

## 2019-04-12 RX ADMIN — SODIUM CHLORIDE, POTASSIUM CHLORIDE, SODIUM LACTATE AND CALCIUM CHLORIDE SCH: 600; 310; 30; 20 INJECTION, SOLUTION INTRAVENOUS at 15:25

## 2019-04-12 RX ADMIN — METHYLPREDNISOLONE SODIUM SUCCINATE SCH MG: 40 INJECTION, POWDER, FOR SOLUTION INTRAMUSCULAR; INTRAVENOUS at 10:31

## 2019-04-12 RX ADMIN — DOCUSATE SODIUM SCH MG: 100 CAPSULE, LIQUID FILLED ORAL at 06:14

## 2019-04-12 RX ADMIN — ATORVASTATIN CALCIUM SCH MG: 10 TABLET, FILM COATED ORAL at 22:24

## 2019-04-12 RX ADMIN — APIXABAN SCH MG: 2.5 TABLET, FILM COATED ORAL at 10:31

## 2019-04-12 RX ADMIN — DONEPEZIL HYDROCHLORIDE SCH MG: 10 TABLET, FILM COATED ORAL at 22:24

## 2019-04-12 RX ADMIN — PANTOPRAZOLE SODIUM SCH: 40 TABLET, DELAYED RELEASE ORAL at 11:54

## 2019-04-12 RX ADMIN — TORSEMIDE SCH MG: 20 TABLET ORAL at 10:30

## 2019-04-12 NOTE — DS
Physical Exam: 


SUBJECTIVE: Patient seen and examined








OBJECTIVE:





 Vital Signs











 Period  Temp  Pulse  Resp  BP Sys/Gonsales  Pulse Ox


 


 Last 24 Hr  97.5 F-98.3 F  48-69  20-20  129-174/71-95  








PHYSICAL EXAM





GENERAL: The patient is awake, alert, and fully oriented, in no acute distress.


HEAD: Normal with no signs of trauma.


EYES: PERRL, extraocular movements intact, sclera anicteric, conjunctiva clear. 


ENT: Ears normal, nares patent, oropharynx clear without exudates, moist mucous 

membranes.


NECK: Trachea midline, full range of motion, supple. 


LUNGS: Breath sounds equal, clear to auscultation bilaterally, no wheezes, no 

crackles, no accessory muscle use. 


HEART: Regular rate and rhythm, S1, S2 without murmur, rub or gallop.


ABDOMEN: Soft, nontender, nondistended, normoactive bowel sounds, no guarding, 

no rebound, no hepatosplenomegaly, no masses.


EXTREMITIES: 2+ pulses, warm, well-perfused, no edema. 


NEUROLOGICAL: Cranial nerves II through XII grossly intact. Normal speech, gait 

not observed.


PSYCH: Normal mood, normal affect.


SKIN: Warm, dry, normal turgor, no rashes or lesions noted.





LABS


 Laboratory Results - last 24 hr











  04/12/19 04/12/19 04/12/19





  07:00 07:00 07:00


 


WBC  8.8  


 


RBC  4.71  


 


Hgb  16.0  


 


Hct  48.6  


 


MCV  103.1 H  


 


MCH  34.0 H  


 


MCHC  33.0  


 


RDW  14.7  


 


Plt Count  143  


 


MPV  7.9  


 


PTT (Actin FS)   26.8 


 


Sodium    139


 


Potassium    4.5


 


Chloride    93 L


 


Carbon Dioxide    41 H


 


Anion Gap    4 L


 


BUN    72 H


 


Creatinine    1.6 H


 


Creat Clearance w eGFR    41.49


 


Random Glucose    107 H


 


Calcium    8.8


 


Total Bilirubin    1.3 H


 


AST    28


 


ALT    23


 


Alkaline Phosphatase    60


 


Total Protein    6.1 L


 


Albumin    2.9 L











HOSPITAL COURSE:





Date of Admission:03/30/19





Date of Discharge: 04/12/19











Minutes to complete discharge: 40





Discharge Summary


Reason For Visit: COPD EXACERBATION/CHF


Current Active Problems





Acute respiratory acidosis (Acute)


Acute respiratory failure with hypoxia and hypercarbia (Acute)


Aneurysm of right popliteal artery (Acute)


CHF exacerbation (Acute)


COPD with acute exacerbation (Acute)


Chronic respiratory acidosis (Acute)


Community acquired pneumonia (Acute)


Leg edema, right (Acute)


Right ventricular systolic dysfunction (Acute)


Systolic heart failure (Acute)








Condition: Guarded





- Instructions


Diet, Activity, Other Instructions: 


You were admitted due to exacerbation of your COPD and Right sided Pneumonia. 

You were treated with steroids and antibiotics and improved. You should have a 

repeat CXR in 4-6 weeks to ensure your pneumonia has cleared.


You were also found to have a Right popliteal artery aneurysm and a stent was 

placed





Continue to use Bipap at bedtime





Your home medications were changed. refer to medication list





Resume a low salt diet





Follow up with your primary care doctor in 1 week


follow up with pulmonary and vascular surgery in 2 weeks





Return to the hospital if your breathing worsens or develop fever (temp >101). 


Referrals: 


Sebastian Rodriguez MD [Staff Physician] - 


Justus Taylor DO [Staff Physician] - 


Darrion yMrick MD [Staff Physician] - 


Disposition: SKILLED NURSING FACILITY





- Home Medications


Comprehensive Discharge Medication List: 


Ambulatory Orders





Ziprasidone [Geodon -] 80 mg PO HS 08/14/12 


Levalbuterol HCl [Xopenex] 1.25 mg IH Q4H PRN 12/02/15 


Metoprolol Succinate 25 mg PO 1 and 1/2 DAILY 09/16/16 


Torsemide 20 mg PO DAILY  tablet 12/16/16 


Ascorbic Acid [Vitamin C] 500 mg PO DAILY  capsule 03/23/17 


Cholecalciferol (Vitamin D3) [Vitamin D3] 2,000 unit PO DAILY  capsule 03/23/17 


Carbamazepine 200 mg PO HS 03/30/19 


Apixaban [Eliquis -] 2.5 mg PO BID  tablet 04/12/19 


Omega-3 Acid Ethyl Esters [Lovaza -] 1 gm PO BID  cap 04/12/19 


Pantoprazole Sodium [Protonix -] 40 mg PO DAILY  tablet.ec 04/12/19 


Sennosides [Senna -] 2 tab PO HS PRN  tablet 04/12/19 











- Discharge Referral


Referred to [facility] No

## 2019-04-12 NOTE — PN
Progress Note (short form)





- Note


Progress Note: 





asymptomatic. denies CP, SOB, fever, chills, cough, N/V/C/D





 Current Medications











Generic Name Dose Route Start Last Admin





  Trade Name Freq  PRN Reason Stop Dose Admin


 


Albuterol Sulfate  1 amp  04/11/19 13:13  





  Ventolin 0.083% Nebulizer Soln -  NEB   





  Q4H PRN   





  SHORT OF BREATH/WHEEZING   





     





     





     


 


Albuterol/Ipratropium  1 amp  04/11/19 16:00  04/12/19 15:33





  Duoneb -  NEB   1 amp





  RQID GARY   Administration





     





     





     





     


 


Apixaban  2.5 mg  04/11/19 13:00  04/12/19 10:31





  Eliquis -  PO   2.5 mg





  BID GARY   Administration





     





     





     





     


 


Ascorbic Acid  500 mg  04/12/19 10:00  04/12/19 10:30





  Vitamin C -  PO   500 mg





  DAILY GARY   Administration





     





     





     





     


 


Atorvastatin Calcium  10 mg  04/11/19 22:00  04/11/19 22:13





  Lipitor -  PO   Not Given





  HS GARY   





     





     





     





     


 


Carbamazepine  200 mg  04/11/19 22:00  04/11/19 22:13





  Tegretol -  PO   Not Given





  HS GARY   





     





     





     





     


 


Digoxin  0.125 mg  04/12/19 10:00  04/12/19 10:30





  Lanoxin -  PO   0.125 mg





  DAILY GARY   Administration





     





     





     





     


 


Docusate Sodium  100 mg  04/11/19 14:00  04/12/19 13:41





  Colace -  PO   100 mg





  TID GARY   Administration





     





     





     





     


 


Donepezil HCl  10 mg  04/11/19 22:00  04/11/19 22:13





  Aricept -  PO   Not Given





  HS GARY   





     





     





     





     


 


Levothyroxine Sodium  50 mcg  04/12/19 07:00  04/12/19 06:14





  Synthroid -  PO   50 mcg





  DAILY@0700 GARY   Administration





     





     





     





     


 


Methylprednisolone Sodium Succinate  40 mg  04/11/19 22:00  04/12/19 10:31





  Solu-Medrol -  IVPUSH   40 mg





  BID GARY   Administration





     





     





     





     


 


Omega-3-Acid Ethyl Esters  1 gm  04/11/19 22:00  04/12/19 10:31





  Lovaza -  PO   1 gm





  BID GARY   Administration





     





     





     





     


 


Pantoprazole Sodium  40 mg  04/12/19 10:00  





  Protonix -  PO   





  DAILY GARY   





     





     





     





     


 


Polyethylene Glycol  17 gm  04/12/19 10:00  04/12/19 13:39





  Miralax (For Daily Use) -  PO   17 gm





  DAILY GARY   Administration





     





     





     





     


 


Senna  2 tab  04/11/19 13:13  





  Senna -  PO   





  HS PRN   





  CONSTIPATION   





     





     





     


 


Torsemide  20 mg  04/12/19 10:00  04/12/19 10:30





  Demadex -  PO   20 mg





  DAILY GARY   Administration





     





     





     





     








 Last Vital Signs











Temp Pulse Resp BP Pulse Ox


 


 98.2 F   74   20   117/62   100 


 


 04/12/19 14:35  04/12/19 14:35  04/12/19 14:35  04/12/19 14:35  04/12/19 09:00








General NAD


CV S1 s2 +


lungs coarse breath sounds. no wheezing


Abdomen soft NT/nD





 CBCD











WBC  8.8 K/mm3 (4.0-10.0)   04/12/19  07:00    


 


RBC  4.71 M/mm3 (4.00-5.60)   04/12/19  07:00    


 


Hgb  16.0 GM/dL (11.7-16.9)   04/12/19  07:00    


 


Hct  48.6 % (35.4-49)   04/12/19  07:00    


 


MCV  103.1 fl (80-96)  H  04/12/19  07:00    


 


MCHC  33.0 g/dl (32.0-35.9)   04/12/19  07:00    


 


RDW  14.7 % (11.9-15.9)   04/12/19  07:00    


 


Plt Count  143 K/MM3 (134-434)   04/12/19  07:00    


 


MPV  7.9 fl (7.5-11.1)   04/12/19  07:00    








 CMP











Sodium  139 mmol/L (136-145)   04/12/19  07:00    


 


Potassium  4.5 mmol/L (3.5-5.1)   04/12/19  07:00    


 


Chloride  93 mmol/L ()  L  04/12/19  07:00    


 


Carbon Dioxide  41 mmol/L (21-32)  H  04/12/19  07:00    


 


Anion Gap  4 MMOL/L (8-16)  L  04/12/19  07:00    


 


BUN  72 mg/dL (7-18)  H  04/12/19  07:00    


 


Creatinine  1.6 mg/dL (0.55-1.3)  H  04/12/19  07:00    


 


Creat Clearance w eGFR  41.49  (>60)   04/12/19  07:00    


 


Calcium  8.8 mg/dL (8.5-10.1)   04/12/19  07:00    


 


Total Bilirubin  1.3 mg/dL (0.2-1)  H  04/12/19  07:00    


 


AST  28 U/L (15-37)   04/12/19  07:00    


 


ALT  23 U/L (13-61)   04/12/19  07:00    


 


Alkaline Phosphatase  60 U/L ()   04/12/19  07:00    


 


Total Protein  6.1 g/dl (6.4-8.2)  L  04/12/19  07:00    


 


Albumin  2.9 g/dl (3.4-5.0)  L  04/12/19  07:00    








 Microbiology





03/30/19 16:05   Blood - Peripheral Venous   Blood Culture - Final


                            NO GROWTH AFTER 5 DAYS INCUBATION


03/30/19 15:50   Blood - Peripheral Venous   Blood Culture - Final


                            NO GROWTH AFTER 5 DAYS INCUBATION


03/31/19 14:50   Sputum - Expectorated   Gram Stain - Final


03/31/19 14:50   Sputum - Expectorated   Sputum Culture - Final


                            NORMAL RESPIRATORY LISSY


03/31/19 13:30   Urine For Antigen Detection   Legionella Antigen - Final


03/31/19 13:30   Urine For Antigen Detection   Streptococcus pneumoniae Antigen 

(M - Final





A/P


84 year old man with a history of COPD, chronic hypoxic respiratory failure, 

pulmonary HTN, hyperlipidemia, right-sided heart failure, atrial fib/flutter, 

hypothyroidism, dementia, depression, ANNA who presented to the ED with SOB and 

leg swelling.


1. Acute exacerbation of COPD secondary to pneumonia with chronic hypoxic 

respiratory failure, ANNA, and severe pulm HTN- clinically improved. on medrol 

BID. completed abx course. on bipap overnight. Continue DuoNeb, albuterol as 

needed. Continue oxygen to maintain saturation >90%. pulm on board


2. 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 eliquis re-started. 

will need vas outpatient follow up


3. Acute on chronic right-sided heart failure. now euovolemic. on home dose of 

toresemine. cardio on board


4. Permanent atrial fib- Continue Digoxin and eliquis


5. Hyperlipidemia- Continue Lipitor, Lovaza


6. Hypothyroidism- Continue Synthroid


7. Depression- Continue Tegretol


8. Dementia- Continue Aricept


9. Stage 3 CKD- Stable


10. medically optimized at this time. awaiting bed at SNF





Visit type





- Emergency Visit


Emergency Visit: Yes


ED Registration Date: 03/30/19


Care time: The patient presented to the Emergency Department on the above date 

and was hospitalized for further evaluation of their emergent condition.





- New Patient


This patient is new to me today: Yes


Date on this admission: 04/12/19





- Critical Care


Critical Care patient: No





- Discharge Referral


Referred to [facility] No

## 2019-04-12 NOTE — PN
Progress Note (short form)





- Note


Progress Note: 











PULMONARY





EATING LUNCH


APPEARS STABLE








VSS


Constitutional: Yes: Well Nourished, Calm


Eyes: Yes: WNL


HENT: Yes: WNL


Neck: Yes: WNL


Cardiovascular: Yes: Pulse Irregular, S1, S2


Respiratory: Yes: On BiPap, Rhonchi (few rhonchi)


Gastrointestinal: Yes: Normal Bowel Sounds, Soft


Extremities: Yes: WNL


Edema: No


Labs: reviewed








Problem List





- Problems


(1) Community acquired pneumonia


Code(s): J18.9 - PNEUMONIA, UNSPECIFIED ORGANISM   





(2) Acute respiratory acidosis


Code(s): E87.2 - ACIDOSIS   





(3) Acute respiratory failure with hypoxia and hypercarbia


Code(s): J96.01 - ACUTE RESPIRATORY FAILURE WITH HYPOXIA; J96.02 - ACUTE 

RESPIRATORY FAILURE WITH HYPERCAPNIA   





(4) Aneurysm of right popliteal artery


Code(s): I72.4 - ANEURYSM OF ARTERY OF LOWER EXTREMITY   





(5) COPD with acute exacerbation


Code(s): J44.1 - CHRONIC OBSTRUCTIVE PULMONARY DISEASE W (ACUTE) EXACERBATION   





(6) Chronic respiratory acidosis


Code(s): E87.2 - ACIDOSIS   





(7) Leg edema, right


Code(s): R60.0 - LOCALIZED EDEMA   





(8) Right ventricular systolic dysfunction


Code(s): I51.9 - HEART DISEASE, UNSPECIFIED   





(9) Atrial flutter


Code(s): I48.92 - UNSPECIFIED ATRIAL FLUTTER   


Qualifiers: 


   Atrial flutter type: unspecified   Qualified Code(s): I48.92 - Unspecified 

atrial flutter   





(10) COPD (chronic obstructive pulmonary disease)


Code(s): J44.9 - CHRONIC OBSTRUCTIVE PULMONARY DISEASE, UNSPECIFIED   


Qualifiers: 


   COPD type: COPD with acute exacerbation   Qualified Code(s): J44.1 - Chronic 

obstructive pulmonary disease with (acute) exacerbation   





(11) Chronic cor pulmonale


Code(s): I27.81 - COR PULMONALE (CHRONIC)   





(12) Dementia


Code(s): F03.90 - UNSPECIFIED DEMENTIA WITHOUT BEHAVIORAL DISTURBANCE   


Qualifiers: 


   Dementia type: unspecified type   Dementia behavioral disturbance: without 

behavioral disturbance   Qualified Code(s): F03.90 - Unspecified dementia 

without behavioral disturbance   





(13) Hyperlipidemia


Code(s): E78.5 - HYPERLIPIDEMIA, UNSPECIFIED   


Qualifiers: 


   Hyperlipidemia type: unspecified   Qualified Code(s): E78.5 - Hyperlipidemia

, unspecified   





(14) Hypertension


Code(s): I10 - ESSENTIAL (PRIMARY) HYPERTENSION   


Qualifiers: 


   Hypertension type: essential hypertension   Qualified Code(s): I10 - 

Essential (primary) hypertension   





(15) Hypothyroidism


Code(s): E03.9 - HYPOTHYROIDISM, UNSPECIFIED   


Qualifiers: 


   Hypothyroidism type: unspecified   Qualified Code(s): E03.9 - Hypothyroidism

, unspecified   





(16) Obstructive sleep apnea


Code(s): G47.33 - OBSTRUCTIVE SLEEP APNEA (ADULT) (PEDIATRIC)   





(17) Pulmonary hypertension


Code(s): I27.2 - OTHER SECONDARY PULMONARY HYPERTENSION * DO NOT USE *   








RLL PNA 


SEVERE PULMONARY HTN


AFIB


COPD


CHRONIC HYPOXEMIC RESPIRATORY FAILURE


OSAS


POPLITEAL ANEURYSM








Steroids


NC O2 as tolerated 


lasix


NIPPV QHS and PRN 


No smoking discussed


AC 


BD TX standing and PRN 


monitor pulse ox





EARNESTINE NEIL MD

## 2019-04-12 NOTE — PN
Progress Note (short form)





- Note


Progress Note: 





POD1 s/p angiogram and stent under MAC.  Doing well, no anesthetic issues/

complications

## 2019-04-12 NOTE — OP
DATE OF OPERATION:  04/11/2019

 

PREOPERATIVE DIAGNOSIS:  Right popliteal artery aneurysm.  

 

POSTOPERATIVE DIAGNOSIS:  Right popliteal artery aneurysm.  

 

PROCEDURE:  Aortogram, right lower extremity angiogram, right popliteal artery stent

graft with angioplasty. 

 

SURGEON:  Justus Lovett MD

 

ANESTHESIA:  Fractional.

 

BLOOD LOSS:  50 mL.

 

FINDINGS:  Right popliteal artery 3.3 cm aneurysm.

 

INDICATIONS:  The patient is an 84-year-old male who has had a CTA done showing that

he has a right popliteal artery aneurysm that is 3.3 cm with thrombus.  It was

decided that he would need to have it repaired.  Patients family was consented for

the procedure understanding all risks, benefits, and alternatives.  .  

 

PROCEDURE IN DETAIL:  Patient was then taken to the operating room, laid out on the

operating table in the supine manner.  The areas of the left and right groin were

prepped and draped in the sterile surgical manner.  We then went injected 10 mL of

lidocaine 1% over the left common femoral artery.  We then took our micropuncture

needle and punctured the left common femoral artery.  A micropuncture wire inserted. 

Micropuncture sheath was inserted, and a traditional 5-Pashto sheath was inserted. 

We then placed a 0.035 floppy guidewire up into the aorta followed by an Omni Flush

catheter.  We then shot an aortogram via hand injection showing that the aorta and

the iliac arteries were without any disease.  We then went up and over using a 0.035

floppy guidewire and placed our Omni Flush catheter followed to the right common

femoral artery.  We then shot an angiogram of the right lower extremity showing that

the common femoral artery, the profunda, and the SFA were patent, but the popliteal

artery above the knee had a 3.3-cm popliteal artery aneurysm.  Patient had 1-vessel

run-off in the form of PT. At this point, we placed a 7 x 45 crossover sheath, 5000

units of IV heparin were administered to the patient.  We then placed a 0.035 floppy

guidewire down to the aneurysm site and exchanged a wire for a 0.014 wire.  We then

used a 7 x 75 Viabahn Stent Graft, and we were able to cover our aneurysmal site in

the popliteal artery.  The stent was deployed.  We then went ahead and used a 6 x 4

Ultraverse Balloon and ballooned the stent in place.  The completion angiogram now

showed that the stent graft was in place.  There was no migration of the stent, and

aneurysm site was excluded.  At this point, we brought our sheath up and over,

StarClose device was successfully deployed in the left common femoral artery. 

Pressure was held for 5 minutes.  After there was no more bleeding, area was wet and

dried, and Dermabond was placed.  The patient tolerated the procedure with no

complications.  Patient transferred to PACU in stable condition.  During the case,

the contrast was diluted to a third due to patient having renal insufficiency. 

Patient did well.  Patient has a palpable PT pulse in PACU, and patient will now be

anticoagulated on Eliquis due to his atrial fibrillation.  

 

 

JUSTUS LOVETT DO [id number]

DD: 04/12/2019 09:13

DT: 04/12/2019 10:05

Job #:  [id number]

## 2019-04-12 NOTE — PN
Progress Note (short form)





- Note


Progress Note: 














POD 1, s/p Aortogram, RLE angiogram, Right popliteal artery stent graft with 

angioplasty











Pt seen and examined. States he is feeling "okay" this morning. Reports some 

pain in RLE and groin. Tolerating PO. No issues overnight. Denies cp/sob.








 Vital Signs











Temp  97.5 F L  04/12/19 08:14


 


Pulse  48 L  04/12/19 08:14


 


Resp  20   04/12/19 08:14


 


BP  139/83   04/12/19 08:14


 


Pulse Ox  100   04/11/19 23:36








 Intake & Output











 04/11/19 04/11/19 04/12/19





 11:59 23:59 11:59


 


Intake Total 1190 300 100


 


Output Total 30  


 


Balance 1160 300 100


 


Intake:   


 


  IV 1070 200 


 


    Heparin - 25,000 Unit In 240  





    Normal Saline - 495 ml @   





    1,000 UNIT/HR 20 mls/hr   





    IV TITR GARY Rx#:   





    [id number]   


 


    saline 30  


 


  IVPB   100


 


  Oral 120 100 


 


Output:   


 


  Estimated Blood Loss 30  


 


Other:   


 


  Voiding Method Incontinent Diaper 


 


  # Unmeasured Voids   


 


    Void 2  


 


  Bowel Movement No  Yes


 


  # Bowel Movements   1








 CBC, BMP





 04/12/19 07:00 





 04/12/19 07:00 











Gen: awake, alert, nad


Resp: Unlabored on 3.5L NC


Groin: L groin with small hematoma. no ecchymosis. 


Vasc: 2+ fem b/l, 2+ pop R, 3+ pop L, palpable dp on L, 2+ pt b/l. SILT b.l le's

, feet warm, wiggles toes, no visible ischemia to toes. 











A/P:  83 y/o M w/ PMHx COPD 2LNC at home, Pulm HTN, HLD, Right Sided CHF, 

Atrial Flutter (on Eliquis) Hypothyroidism, Dementia, Depression, ANNA admitted 


3/30 with cough/sob, found to have b/l pop aneurysms (L 1.5cm, R 3.3cm) now POD 

1, s/p Aortogram, RLE angiogram, Right popliteal artery stent graft with 

angioplasty.





Stable post op. Creatinine stable. Vasc exam intact





-Care per primary team


-Pt should f/u with Dr Taylor upon d/c





d/w attending Dr Taylor

## 2019-04-12 NOTE — PN
Progress Note (short form)





- Note


Progress Note: 





s: sable dyspnea. no cp, palps, dizziness


  Current Medications





Albuterol Sulfate (Ventolin 0.083% Nebulizer Soln -)  1 amp NEB Q4H PRN


   PRN Reason: SHORT OF BREATH/WHEEZING


Albuterol/Ipratropium (Duoneb -)  1 amp NEB RQID [facility]


   Last Admin: 04/12/19 15:33 Dose:  1 amp


Apixaban (Eliquis -)  2.5 mg PO BID [facility]


   Last Admin: 04/12/19 10:31 Dose:  2.5 mg


Ascorbic Acid (Vitamin C -)  500 mg PO DAILY [facility]


   Last Admin: 04/12/19 10:30 Dose:  500 mg


Atorvastatin Calcium (Lipitor -)  10 mg PO HS [facility]


   Last Admin: 04/11/19 22:13 Dose:  Not Given


Carbamazepine (Tegretol -)  200 mg PO HS [facility]


   Last Admin: 04/11/19 22:13 Dose:  Not Given


Digoxin (Lanoxin -)  0.125 mg PO DAILY [facility]


   Last Admin: 04/12/19 10:30 Dose:  0.125 mg


Docusate Sodium (Colace -)  100 mg PO TID [facility]


   Last Admin: 04/12/19 13:41 Dose:  100 mg


Donepezil HCl (Aricept -)  10 mg PO HS [facility]


   Last Admin: 04/11/19 22:13 Dose:  Not Given


Levothyroxine Sodium (Synthroid -)  50 mcg PO DAILY@0700 [facility]


   Last Admin: 04/12/19 06:14 Dose:  50 mcg


Methylprednisolone Sodium Succinate (Solu-Medrol -)  40 mg IVPUSH BID [facility]


   Last Admin: 04/12/19 10:31 Dose:  40 mg


Omega-3-Acid Ethyl Esters (Lovaza -)  1 gm PO BID [facility]


   Last Admin: 04/12/19 10:31 Dose:  1 gm


Pantoprazole Sodium (Protonix -)  40 mg PO DAILY [facility]


Polyethylene Glycol (Miralax (For Daily Use) -)  17 gm PO DAILY [facility]


   Last Admin: 04/12/19 13:39 Dose:  17 gm


Senna (Senna -)  2 tab PO HS PRN


   PRN Reason: CONSTIPATION


Torsemide (Demadex -)  20 mg PO DAILY [facility]


   Last Admin: 04/12/19 10:30 Dose:  20 mg





 Vital Signs











 Period  Temp  Pulse  Resp  BP Sys/Gonsales  Pulse Ox


 


 Last 24 Hr  97.5 F-98.3 F  48-74  20-20  117-174/62-95  














Constitutional: Yes: No Distress, Calm


Eyes: No: Sclera Icterus


HENT: No: Nasal Congestion


Cardiovascular: Yes: Pulse Irregular, S1, S2, Other (PMI non diplaced).  No: JVD

, Gallop, Murmur


Respiratory: Yes: CTA Bilaterally (very decr diffusely).  No: Accessory Muscle 

Use, Rales, Wheezes


Gastrointestinal: Yes: Normal Bowel Sounds, Soft.  No: Tenderness


Musculoskeletal: Yes: Other (No kyphosis)


Extremities: No: Cold, Cyanosis


Edema: No


Integumentary: No: Jaundice


Neurological: Yes: Alert.  No: Seizure


Psychiatric: No: Agitated








Assessment/Plan








Pharm nuc stress 2/18/16: Afib with RVR. Asx. No ischemic changes. Nl perf. Nl 

EF. 





CXR: no congestion





CT chest: extensive COPD changes





EKG: sinus, PACs, afib





RLE ultrasound, no DVT, R popliteal artery aneurysm





Echo 4/2019 low nl LV function, mildly reduced RV function, mildly dilated RA, 

mild MAC, mod TR, PASP at least 70 mmHg, mild AR, borderline aortic root 

dilation





tele: satr flutter, HRs good, richard o/n








a/p:


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 

on coronary calcifications on chest CT, stable aortic dilation of the ascending 

and abdominal aorta, HTN/HL, remote history of DVT, afib on eliquis p/w SOB, 

cough.





SOB, cough, COPD


- CT with significant findings for COPD, receiving nebs and steroids per primary

, pulm





acute Right heart failure, pulm HTN


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


- on home O2, follows with dr navarro for tx of sleep apnea


- BNP >11,000 


- echo shows severe pulm HTN, low normal RV function--stable long time


- received lasix 40 mg IV x 1, now on 80 mg IV BID - has abd distension, which 

he has had in the past with volume overload


4/6: vol status improved, cr rising, will change to oral torsemide now. 


4/7: cont torsemide 40 bid, monitor Cr, lytes, daily standing weights.


-4/8: bun/creat trending up, weights very variable, neck veins flat--pt likely 

overdiuresed/hypovolemic. change torsemide 40 bid to 20 qd, observe for LE 

edema (reliably his HF sx).


-4/9-10, 4/12: BUN/Cr stable continue torsemide 20 mg daliy





Atrial fibrillation. 


-rate ok, cont digoxin as doing (level good)


-on metoprolol at home as well, which he has tolerated well--held here and HR 

good, observe. defer CCB to avoid exacerbating RV contractility issues


-was on heparin gtt preop, eliquis restarted





popliteal artery aneurysm


- s/p covered stent placement





HL: 


- cont statin

## 2019-04-13 LAB
ANION GAP SERPL CALC-SCNC: 4 MMOL/L (ref 8–16)
ARTERIAL BLOOD GAS PCO2: 50.9 MMHG (ref 35–45)
ARTERIAL PATENCY WRIST A: POSITIVE
BASE EXCESS BLDA CALC-SCNC: 12 MEQ/L (ref -2–2)
BASOPHILS # BLD: 0.1 % (ref 0–2)
BUN SERPL-MCNC: 55 MG/DL (ref 7–18)
CALCIUM SERPL-MCNC: 8.6 MG/DL (ref 8.5–10.1)
CHLORIDE SERPL-SCNC: 95 MMOL/L (ref 98–107)
CO2 SERPL-SCNC: 41 MMOL/L (ref 21–32)
CREAT SERPL-MCNC: 1.4 MG/DL (ref 0.55–1.3)
DEPRECATED RDW RBC AUTO: 14.4 % (ref 11.9–15.9)
DEPRECATED RDW RBC AUTO: 14.5 % (ref 11.9–15.9)
EOSINOPHIL # BLD: 0.3 % (ref 0–4.5)
GLUCOSE SERPL-MCNC: 91 MG/DL (ref 74–106)
HCT VFR BLD CALC: 47.5 % (ref 35.4–49)
HCT VFR BLD CALC: 49.5 % (ref 35.4–49)
HGB BLD-MCNC: 15.8 GM/DL (ref 11.7–16.9)
HGB BLD-MCNC: 16.2 GM/DL (ref 11.7–16.9)
LYMPHOCYTES # BLD: 9.2 % (ref 8–40)
MCH RBC QN AUTO: 34.1 PG (ref 25.7–33.7)
MCH RBC QN AUTO: 34.2 PG (ref 25.7–33.7)
MCHC RBC AUTO-ENTMCNC: 32.8 G/DL (ref 32–35.9)
MCHC RBC AUTO-ENTMCNC: 33.1 G/DL (ref 32–35.9)
MCV RBC: 102.9 FL (ref 80–96)
MCV RBC: 104.3 FL (ref 80–96)
MONOCYTES # BLD AUTO: 6.2 % (ref 3.8–10.2)
NEUTROPHILS # BLD: 84.2 % (ref 42.8–82.8)
PLATELET # BLD AUTO: 136 K/MM3 (ref 134–434)
PLATELET # BLD AUTO: 138 K/MM3 (ref 134–434)
PMV BLD: 7.8 FL (ref 7.5–11.1)
PMV BLD: 8.1 FL (ref 7.5–11.1)
PO2 BLDA: 49.8 MMHG (ref 80–105)
POTASSIUM SERPLBLD-SCNC: 4.5 MMOL/L (ref 3.5–5.1)
RBC # BLD AUTO: 4.62 M/MM3 (ref 4–5.6)
RBC # BLD AUTO: 4.75 M/MM3 (ref 4–5.6)
SAO2 % BLDA: 85.9 % (ref 95–98)
SODIUM SERPL-SCNC: 139 MMOL/L (ref 136–145)
WBC # BLD AUTO: 7.5 K/MM3 (ref 4–10)
WBC # BLD AUTO: 8.3 K/MM3 (ref 4–10)

## 2019-04-13 RX ADMIN — OMEGA-3-ACID ETHYL ESTERS SCH: 1 CAPSULE, LIQUID FILLED ORAL at 11:38

## 2019-04-13 RX ADMIN — POLYETHYLENE GLYCOL 3350 SCH: 17 POWDER, FOR SOLUTION ORAL at 11:38

## 2019-04-13 RX ADMIN — IPRATROPIUM BROMIDE AND ALBUTEROL SULFATE SCH AMP: .5; 3 SOLUTION RESPIRATORY (INHALATION) at 08:10

## 2019-04-13 RX ADMIN — LEVOTHYROXINE SODIUM SCH MCG: 50 TABLET ORAL at 06:07

## 2019-04-13 RX ADMIN — APIXABAN SCH: 2.5 TABLET, FILM COATED ORAL at 11:37

## 2019-04-13 RX ADMIN — METHYLPREDNISOLONE SODIUM SUCCINATE SCH MG: 40 INJECTION, POWDER, FOR SOLUTION INTRAMUSCULAR; INTRAVENOUS at 22:01

## 2019-04-13 RX ADMIN — OXYCODONE HYDROCHLORIDE AND ACETAMINOPHEN SCH: 500 TABLET ORAL at 11:38

## 2019-04-13 RX ADMIN — ATORVASTATIN CALCIUM SCH MG: 10 TABLET, FILM COATED ORAL at 22:01

## 2019-04-13 RX ADMIN — OMEGA-3-ACID ETHYL ESTERS SCH GM: 1 CAPSULE, LIQUID FILLED ORAL at 22:01

## 2019-04-13 RX ADMIN — APIXABAN SCH MG: 2.5 TABLET, FILM COATED ORAL at 22:01

## 2019-04-13 RX ADMIN — DIGOXIN SCH: 125 TABLET ORAL at 11:37

## 2019-04-13 RX ADMIN — IPRATROPIUM BROMIDE AND ALBUTEROL SULFATE SCH AMP: .5; 3 SOLUTION RESPIRATORY (INHALATION) at 11:31

## 2019-04-13 RX ADMIN — DOCUSATE SODIUM SCH MG: 100 CAPSULE, LIQUID FILLED ORAL at 22:01

## 2019-04-13 RX ADMIN — DOCUSATE SODIUM SCH MG: 100 CAPSULE, LIQUID FILLED ORAL at 06:07

## 2019-04-13 RX ADMIN — TORSEMIDE SCH: 20 TABLET ORAL at 11:37

## 2019-04-13 RX ADMIN — IPRATROPIUM BROMIDE AND ALBUTEROL SULFATE SCH AMP: .5; 3 SOLUTION RESPIRATORY (INHALATION) at 15:15

## 2019-04-13 RX ADMIN — DONEPEZIL HYDROCHLORIDE SCH MG: 10 TABLET, FILM COATED ORAL at 22:01

## 2019-04-13 RX ADMIN — IPRATROPIUM BROMIDE AND ALBUTEROL SULFATE SCH AMP: .5; 3 SOLUTION RESPIRATORY (INHALATION) at 20:22

## 2019-04-13 RX ADMIN — METHYLPREDNISOLONE SODIUM SUCCINATE SCH: 40 INJECTION, POWDER, FOR SOLUTION INTRAMUSCULAR; INTRAVENOUS at 11:38

## 2019-04-13 RX ADMIN — PANTOPRAZOLE SODIUM SCH: 40 TABLET, DELAYED RELEASE ORAL at 11:38

## 2019-04-13 RX ADMIN — DEXTROSE AND SODIUM CHLORIDE SCH MLS/HR: 5; 450 INJECTION, SOLUTION INTRAVENOUS at 12:57

## 2019-04-13 RX ADMIN — DOCUSATE SODIUM SCH: 100 CAPSULE, LIQUID FILLED ORAL at 13:27

## 2019-04-13 NOTE — RAPID
Physical Examination


Vital Signs: 


 Vital Signs











Temperature  97.5 F L  04/13/19 06:00


 


Pulse Rate  85   04/13/19 06:00


 


Respiratory Rate  20   04/13/19 06:00


 


Blood Pressure  128/75   04/13/19 06:00


 


O2 Sat by Pulse Oximetry (%)  97   04/13/19 03:20








Rapid response called for Pt. having increased confusion, inability, refusing 

to eat and continuously closing eyes which was different from the baseline last 

night. Dr. Cardoza notified. Vital signs: 118/71 71 90% on 2L NC [id number]. 

Physical exam CTAB, S1,S2 RRR, BS+ and no abdominal tenderness. When we arrived

, Pt. able to open eyes to command and follow simple commands however with 

repeated insistence. CT Head ordered Stat and ABG as it was unclear to us why 

there was an acute change in mental status at this time.    


Labs: 


 CBC, BMP





 04/13/19 06:30 





 04/12/19 07:00

## 2019-04-13 NOTE — PN
Physical Exam: 


SUBJECTIVE: Patient seen and examined








OBJECTIVE:





 Vital Signs











 Period  Temp  Pulse  Resp  BP Sys/Gonsales  Pulse Ox


 


 Last 24 Hr  97.3 F-98.2 F  69-86  20-20  104-128/61-75  96-97








He is somnolent, dose not respond to verbal commands, moans with painful 

stimulus, does not move the arms, retracts the legs in response to painful 

stimulus. his eyes are open, mid size pupils.


Cardiac: s1s2, irregular


Lungs: CTAB, no wheezing at this time


is on O2 2 l nc.


Abd:BS+ NT/ND


cachectic.


no EVA edema


Has a pressure bag to control the hematoma














 Laboratory Results - last 24 hr











  04/13/19 04/13/19 04/13/19





  06:30 06:30 09:36


 


WBC  8.3  


 


RBC  4.62  


 


Hgb  15.8  


 


Hct  47.5  


 


MCV  102.9 H  


 


MCH  34.1 H  


 


MCHC  33.1  


 


RDW  14.5  


 


Plt Count  138  


 


MPV  7.8  


 


PTT (Actin FS)   28.9 


 


POC Glucometer    98








Active Medications











Generic Name Dose Route Start Last Admin





  Trade Name Freq  PRN Reason Stop Dose Admin


 


Albuterol Sulfate  1 amp  04/11/19 13:13  





  Ventolin 0.083% Nebulizer Soln -  NEB   





  Q4H PRN   





  SHORT OF BREATH/WHEEZING   





     





     





     


 


Albuterol/Ipratropium  1 amp  04/11/19 16:00  04/12/19 20:50





  Duoneb -  NEB   1 amp





  RQID GARY   Administration





     





     





     





     


 


Apixaban  2.5 mg  04/11/19 13:00  04/12/19 22:24





  Eliquis -  PO   2.5 mg





  BID GARY   Administration





     





     





     





     


 


Ascorbic Acid  500 mg  04/12/19 10:00  04/12/19 10:30





  Vitamin C -  PO   500 mg





  DAILY GARY   Administration





     





     





     





     


 


Atorvastatin Calcium  10 mg  04/11/19 22:00  04/12/19 22:24





  Lipitor -  PO   10 mg





  HS GARY   Administration





     





     





     





     


 


Carbamazepine  200 mg  04/11/19 22:00  04/12/19 22:24





  Tegretol -  PO   200 mg





  HS GARY   Administration





     





     





     





     


 


Digoxin  0.125 mg  04/12/19 10:00  04/12/19 10:30





  Lanoxin -  PO   0.125 mg





  DAILY GARY   Administration





     





     





     





     


 


Docusate Sodium  100 mg  04/11/19 14:00  04/13/19 06:07





  Colace -  PO   100 mg





  TID GARY   Administration





     





     





     





     


 


Donepezil HCl  10 mg  04/11/19 22:00  04/12/19 22:24





  Aricept -  PO   10 mg





  HS GARY   Administration





     





     





     





     


 


Levothyroxine Sodium  50 mcg  04/12/19 07:00  04/13/19 06:07





  Synthroid -  PO   50 mcg





  DAILY@0700 GARY   Administration





     





     





     





     


 


Methylprednisolone Sodium Succinate  40 mg  04/11/19 22:00  04/12/19 22:24





  Solu-Medrol -  IVPUSH   40 mg





  BID GARY   Administration





     





     





     





     


 


Omega-3-Acid Ethyl Esters  1 gm  04/11/19 22:00  04/12/19 22:24





  Lovaza -  PO   1 gm





  BID GARY   Administration





     





     





     





     


 


Pantoprazole Sodium  40 mg  04/12/19 10:00  04/12/19 11:54





  Protonix -  PO   Not Given





  DAILY GARY   





     





     





     





     


 


Polyethylene Glycol  17 gm  04/12/19 10:00  04/12/19 13:39





  Miralax (For Daily Use) -  PO   17 gm





  DAILY GARY   Administration





     





     





     





     


 


Senna  2 tab  04/11/19 13:13  





  Senna -  PO   





  HS PRN   





  CONSTIPATION   





     





     





     


 


Torsemide  20 mg  04/12/19 10:00  04/12/19 10:30





  Demadex -  PO   20 mg





  DAILY GARY   Administration





     





     





     





     











ASSESSMENT/PLAN:


85 Y/O M W COPD, chronic hypoxic respiratory failure, pulmonary HTN, 

hyperlipidemia, right-sided heart failure, atrial fib/flutter, hypothyroidism, 

dementia, depression, ANNA who presented to the ED with SOB and leg swelling. 

los 14








1.AMS: this morning was altered and non responsive, RRT called, VS are 

stableand FS 98, last night he has been agitated and had no t tolerated BIPAP. 

Pending HCT, ABG. did not get any sedatives, opioids last night ,


pending HCT( afib on AC), pending ABG, Ammonia, lactate levels








2. Acute exacerbation of COPD secondary to pneumonia with chronic hypoxic 

respiratory failure, ANNA, and severe pulm HTN- clinically improved. on medrol 

BID. completed abx course. on bipap overnight. Continue DuoNeb, albuterol as 

needed. Continue oxygen to maintain saturation >90%. pulm on board, gwendolyn discuss 

need for continuation of IV steroids for him.











3. 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 eliquis re-

started. will need [facility] outpatient follow up





4. Acute on chronic right-sided heart failure. now euovolemic. on home dose of 

toresemine. cardio on board





5. Permanent atrial fib- Continue Digoxin and eliquis





6. Hyperlipidemia- Continue Lipitor, Lovaza


7. Hypothyroidism- Continue Synthroid


8. Depression- Continue Tegretol


9. Dementia- Continue Aricept


10. Stage 3 CKD- Stable


11: Dispo: was supposed to go to [facility],pending improvement of MS.





Visit type





- Emergency Visit


Emergency Visit: Yes


ED Registration Date: 03/30/19


Care time: The patient presented to the Emergency Department on the above date 

and was hospitalized for further evaluation of their emergent condition.





- New Patient


This patient is new to me today: Yes


Date on this admission: 04/13/19





- Critical Care


Critical Care patient: No





- Discharge Referral


Referred to [facility] No

## 2019-04-13 NOTE — PN
Progress Note (short form)





- Note


Progress Note: 





 ABG: Metabolic alkalosis, with  compensated respiratory acidosis.


Hypoxic respiratory failure


pending HCT








Visit type





- Emergency Visit


Emergency Visit: Yes


ED Registration Date: 03/30/19


Care time: The patient presented to the Emergency Department on the above date 

and was hospitalized for further evaluation of their emergent condition.





- New Patient


This patient is new to me today: Yes


Date on this admission: 04/13/19





- Critical Care


Critical Care patient: No





- Discharge Referral


Referred to [facility] No

## 2019-04-13 NOTE — PN
Progress Note (short form)





- Note


Progress Note: 





PULMONARY





Episode of altered mental status this AM, now close to baseline per nursing. 

Studies thus far unremarkable. No current complaints aside from feeling thirsty.





 Vital Signs











 Period  Temp  Pulse  Resp  BP Sys/Gonsales  Pulse Ox


 


 Last 24 Hr  97.3 F-98.2 F  74-86  20-20  104-128/61-75  91-97








Gen:  NAD at rest


Heart:  RRR


Lung: decreased breath sounds at the bases


Abd: soft, nontender


Ext: no edema





 CBC, BMP





 04/13/19 11:30 





 04/13/19 11:30 





Active Medications





Albuterol Sulfate (Ventolin 0.083% Nebulizer Soln -)  1 amp NEB Q4H PRN


   PRN Reason: SHORT OF BREATH/WHEEZING


Albuterol/Ipratropium (Duoneb -)  1 amp NEB RQID [facility]


   Last Admin: 04/13/19 11:31 Dose:  1 amp


Apixaban (Eliquis -)  2.5 mg PO BID [facility]


   Last Admin: 04/13/19 11:37 Dose:  Not Given


Ascorbic Acid (Vitamin C -)  500 mg PO DAILY [facility]


   Last Admin: 04/13/19 11:38 Dose:  Not Given


Atorvastatin Calcium (Lipitor -)  10 mg PO HS [facility]


   Last Admin: 04/12/19 22:24 Dose:  10 mg


Carbamazepine (Tegretol -)  200 mg PO HS [facility]


   Last Admin: 04/12/19 22:24 Dose:  200 mg


Digoxin (Lanoxin -)  0.125 mg PO DAILY [facility]


   Last Admin: 04/13/19 11:37 Dose:  Not Given


Docusate Sodium (Colace -)  100 mg PO TID [facility]


   Last Admin: 04/13/19 06:07 Dose:  100 mg


Donepezil HCl (Aricept -)  10 mg PO HS [facility]


   Last Admin: 04/12/19 22:24 Dose:  10 mg


Dextrose/Sodium Chloride (D5-1/2ns -)  1,000 mls @ 75 mls/hr IV ASDIR [facility]


   Last Admin: 04/13/19 12:57 Dose:  75 mls/hr


Levothyroxine Sodium (Synthroid -)  50 mcg PO DAILY@0700 [facility]


   Last Admin: 04/13/19 06:07 Dose:  50 mcg


Methylprednisolone Sodium Succinate (Solu-Medrol -)  40 mg IVPUSH BID [facility]


   Last Admin: 04/13/19 11:38 Dose:  Not Given


Omega-3-Acid Ethyl Esters (Lovaza -)  1 gm PO BID [facility]


   Last Admin: 04/13/19 11:38 Dose:  Not Given


Pantoprazole Sodium (Protonix -)  40 mg PO DAILY [facility]


   Last Admin: 04/13/19 11:38 Dose:  Not Given


Polyethylene Glycol (Miralax (For Daily Use) -)  17 gm PO DAILY [facility]


   Last Admin: 04/13/19 11:38 Dose:  Not Given


Senna (Senna -)  2 tab PO HS PRN


   PRN Reason: CONSTIPATION


Torsemide (Demadex -)  20 mg PO DAILY [facility]


   Last Admin: 04/13/19 11:37 Dose:  Not Given





A/P


Altered Mental Status resolving


Acute COPD Exacerbation


Pneumonia


Chronic Hypoxic Respiratory Failure


Acute on Chronic Diastolic Heart Failure


Pulmonary HTN


Atrial Fibrillation


ANNA


CKD


Hyperlipidemia


Hypothyroidism


Dementia





-  completed antibiotics


-  can change steroids to PO prednisone


-  O2 to keep SpO2 >90%


-  BiPAP at night and PRN during day


-  inhaled bronchodilators


-  DVT prophylaxis

## 2019-04-14 LAB
DEPRECATED RDW RBC AUTO: 14.6 % (ref 11.9–15.9)
HCT VFR BLD CALC: 47.1 % (ref 35.4–49)
HGB BLD-MCNC: 15.3 GM/DL (ref 11.7–16.9)
MCH RBC QN AUTO: 33.6 PG (ref 25.7–33.7)
MCHC RBC AUTO-ENTMCNC: 32.4 G/DL (ref 32–35.9)
MCV RBC: 103.6 FL (ref 80–96)
PLATELET # BLD AUTO: 138 K/MM3 (ref 134–434)
PMV BLD: 8 FL (ref 7.5–11.1)
RBC # BLD AUTO: 4.55 M/MM3 (ref 4–5.6)
WBC # BLD AUTO: 7.2 K/MM3 (ref 4–10)

## 2019-04-14 RX ADMIN — LEVOTHYROXINE SODIUM SCH MCG: 50 TABLET ORAL at 06:12

## 2019-04-14 RX ADMIN — APIXABAN SCH MG: 2.5 TABLET, FILM COATED ORAL at 10:25

## 2019-04-14 RX ADMIN — DEXTROSE AND SODIUM CHLORIDE SCH MLS/HR: 5; 450 INJECTION, SOLUTION INTRAVENOUS at 16:30

## 2019-04-14 RX ADMIN — OMEGA-3-ACID ETHYL ESTERS SCH GM: 1 CAPSULE, LIQUID FILLED ORAL at 10:26

## 2019-04-14 RX ADMIN — OXYCODONE HYDROCHLORIDE AND ACETAMINOPHEN SCH MG: 500 TABLET ORAL at 10:25

## 2019-04-14 RX ADMIN — DEXTROSE AND SODIUM CHLORIDE SCH MLS/HR: 5; 450 INJECTION, SOLUTION INTRAVENOUS at 02:33

## 2019-04-14 RX ADMIN — POLYETHYLENE GLYCOL 3350 SCH GM: 17 POWDER, FOR SOLUTION ORAL at 15:41

## 2019-04-14 RX ADMIN — DONEPEZIL HYDROCHLORIDE SCH MG: 10 TABLET, FILM COATED ORAL at 22:00

## 2019-04-14 RX ADMIN — ATORVASTATIN CALCIUM SCH MG: 10 TABLET, FILM COATED ORAL at 22:00

## 2019-04-14 RX ADMIN — OMEGA-3-ACID ETHYL ESTERS SCH GM: 1 CAPSULE, LIQUID FILLED ORAL at 21:59

## 2019-04-14 RX ADMIN — DOCUSATE SODIUM SCH MG: 100 CAPSULE, LIQUID FILLED ORAL at 06:12

## 2019-04-14 RX ADMIN — DIGOXIN SCH MG: 125 TABLET ORAL at 10:25

## 2019-04-14 RX ADMIN — IPRATROPIUM BROMIDE AND ALBUTEROL SULFATE SCH AMP: .5; 3 SOLUTION RESPIRATORY (INHALATION) at 21:37

## 2019-04-14 RX ADMIN — IPRATROPIUM BROMIDE AND ALBUTEROL SULFATE SCH AMP: .5; 3 SOLUTION RESPIRATORY (INHALATION) at 07:35

## 2019-04-14 RX ADMIN — IPRATROPIUM BROMIDE AND ALBUTEROL SULFATE SCH AMP: .5; 3 SOLUTION RESPIRATORY (INHALATION) at 11:45

## 2019-04-14 RX ADMIN — METHYLPREDNISOLONE SODIUM SUCCINATE SCH MG: 40 INJECTION, POWDER, FOR SOLUTION INTRAMUSCULAR; INTRAVENOUS at 10:26

## 2019-04-14 RX ADMIN — DOCUSATE SODIUM SCH MG: 100 CAPSULE, LIQUID FILLED ORAL at 15:44

## 2019-04-14 RX ADMIN — PANTOPRAZOLE SODIUM SCH MG: 40 TABLET, DELAYED RELEASE ORAL at 15:42

## 2019-04-14 RX ADMIN — APIXABAN SCH MG: 2.5 TABLET, FILM COATED ORAL at 21:59

## 2019-04-14 RX ADMIN — IPRATROPIUM BROMIDE AND ALBUTEROL SULFATE SCH AMP: .5; 3 SOLUTION RESPIRATORY (INHALATION) at 15:30

## 2019-04-14 RX ADMIN — DOCUSATE SODIUM SCH MG: 100 CAPSULE, LIQUID FILLED ORAL at 21:59

## 2019-04-14 NOTE — CON.NEURO
Consult





- Past Medical History


CNS: Yes: Dementia


Cardio/Vascular: Yes: AFIB, Aneurysm (thoracic), HTN, Hyperlipdemia, Pulmonary 

Hypertension


Pulmonary: Yes: Bronchitis, COPD, O2 Dependent, Pneumonia, Sleep Apnea.  No: 

Previously Intubated, Pulmonary Embolus, Pulmonary Fibrosis


Psych: Yes: Depression


Endocrine: Yes: Hypothyroidism





- Past Surgical History


Past Surgical History: Yes: Cataract Removal (bilateral)





- Alcohol/Substance Use


Hx Alcohol Use: Yes


History of Substance Use: reports: None





- Smoking History


Smoking history: Current every day smoker


Have you smoked in the past 12 months: Yes


Aproximately how many cigarettes per day: 8


If you are a former smoker, when did you quit?: 5 DAYS AGO





- Social History


ADL: Independent


History of Recent Travel: No





Home Medications





- Allergies


Allergies/Adverse Reactions: 


 Allergies











Allergy/AdvReac Type Severity Reaction Status Date / Time


 


No Known Drug Allergies Allergy   Verified 03/30/19 12:19














- Home Medications


Home Medications: 


Ambulatory Orders





Ziprasidone [Geodon -] 80 mg PO HS 08/14/12 


Levalbuterol HCl [Xopenex] 1.25 mg IH Q4H PRN 12/02/15 


Metoprolol Succinate 25 mg PO 1 and 1/2 DAILY 09/16/16 


Torsemide 20 mg PO DAILY  tablet 12/16/16 


Ascorbic Acid [Vitamin C] 500 mg PO DAILY  capsule 03/23/17 


Cholecalciferol (Vitamin D3) [Vitamin D3] 2,000 unit PO DAILY  capsule 03/23/17 


Carbamazepine 200 mg PO HS 03/30/19 


Apixaban [Eliquis -] 2.5 mg PO BID  tablet 04/12/19 


Omega-3 Acid Ethyl Esters [Lovaza -] 1 gm PO BID  cap 04/12/19 


Pantoprazole Sodium [Protonix -] 40 mg PO DAILY  tablet.ec 04/12/19 


Sennosides [Senna -] 2 tab PO HS PRN  tablet 04/12/19 











Physical Exam-Neuro


Vital Signs: 


 Vital Signs











Temperature  97.6 F   04/14/19 14:32


 


Pulse Rate  77   04/14/19 14:32


 


Respiratory Rate  20   04/14/19 14:32


 


Blood Pressure  117/65   04/14/19 14:32


 


O2 Sat by Pulse Oximetry (%)  94 L  04/14/19 09:00











Labs: 


 CBC, BMP





 04/14/19 06:30 





 04/13/19 11:30 





 INR, PTT











INR  1.05  (0.83-1.09)   04/09/19  05:15    














Assessment/Plan


cc Transient confusion and history of dementia


HPI 84 year old male with multiple medical problem, including Pulmonary 

hypertension, copd , atrial flutter ( on eliquis), hypothyroidism, dementia, 

depression ? seizure. Patient has been in hospital for a while, and he was 

about to be discharged. He is alert oriented x 2 as baseline. He did get 

confused and was not talking and not able follow command on april 13 morning. 

It lasted for 4-6 hours, he has ct head done and , blood test including nh3 

level, and other electrolyte. He has work up normal. There was no headache, no 

fever or infection. There was no seizure like activity or motor weakness .


He lives with his son and quit smoking 5 years ago.


PMH as above


SH,FH,ROS reviewed in chart 





NKDA





Ambulatory Orders








Levalbuterol HCl [Xopenex] 1.25 mg IH Q4H PRN 12/02/15 


Potassium Chloride 20 meq PO DAILY  tablet 06/16/16 


Metoprolol Succinate 25 mg PO 1 and 1/2 DAILY 09/16/16 


Torsemide 20 mg PO DAILY  tablet 12/16/16 


Ascorbic Acid [Vitamin C] 500 mg PO DAILY  capsule 03/23/17 


Cholecalciferol (Vitamin D3) [Vitamin D3] 2,000 unit PO DAILY  capsule 03/23/17 


Carbamazepine 200 mg PO HS 03/30/19 


Aricept, Eliquis, Digoxin , levothyroxin and prednisone 





NEUROLOGICAL EXAMINATION


Alert oriented x 2, speech is normal no evidence of neck stiffnes


EOMI, pupil  is reactive no face asymmetry


motor 5/5 all ext








Ct head is normal











Assessment/Plan 84 year old male multiple medical problem including Atrial 

fibrillation, Pulmonary hypertension, dementia and ? seizrue ( on cbz). Patient 

has transient confusion and now resolved back to normal. Clinically unlikley to 

be tia, possibility of seizure cant be rule out , other possibility is 

dehydration , which was corrected after IV fluid. Patient is back to baseline








Plan : continue current level of care


- he is on eliquis and statin, no need for stroke/tia work up as suspician is 

low


- would do eeg as he has ? seizure is on cbz, continue current dose of cbz


- watch clinically, supportive care





Thanking you so much


Samuel Mclean MD

## 2019-04-14 NOTE — PN
Progress Note (short form)





- Note


Progress Note: 





PULMONARY





Mental status back to baseline. States breathing better. Minimal cough. No 

fevers recorded.





 Vital Signs











 Period  Temp  Pulse  Resp  BP Sys/Gonsales  Pulse Ox


 


 Last 24 Hr  97.7 F-98.3 F  58-93  20-22  107-124/54-66  93-94











Gen:  NAD at rest


Heart:  RRR


Lung: decreased breath sounds at the bases


Abd: soft, nontender


Ext: no edema





 CBC, BMP





 04/14/19 06:30 





 04/13/19 11:30 





Active Medications





Albuterol Sulfate (Ventolin 0.083% Nebulizer Soln -)  1 amp NEB Q4H PRN


   PRN Reason: SHORT OF BREATH/WHEEZING


Albuterol/Ipratropium (Duoneb -)  1 amp [facility]


   Last Admin: 04/14/19 07:35 Dose:  1 amp


Apixaban (Eliquis -)  2.5 mg PO BID [facility]


   Last Admin: 04/14/19 10:25 Dose:  2.5 mg


Ascorbic Acid (Vitamin C -)  500 mg PO DAILY [facility]


   Last Admin: 04/14/19 10:25 Dose:  500 mg


Atorvastatin Calcium (Lipitor -)  10 mg PO HS [facility]


   Last Admin: 04/13/19 22:01 Dose:  10 mg


Carbamazepine (Tegretol -)  200 mg PO HS [facility]


   Last Admin: 04/13/19 22:01 Dose:  200 mg


Digoxin (Lanoxin -)  0.125 mg PO DAILY [facility]


   Last Admin: 04/14/19 10:25 Dose:  0.125 mg


Docusate Sodium (Colace -)  100 mg PO TID [facility]


   Last Admin: 04/14/19 06:12 Dose:  100 mg


Donepezil HCl (Aricept -)  10 mg PO HS [facility]


   Last Admin: 04/13/19 22:01 Dose:  10 mg


Dextrose/Sodium Chloride (D5-1/2ns -)  1,000 mls @ 75 mls/hr IV ASDIR [facility]


   Last Admin: 04/14/19 02:33 Dose:  75 mls/hr


Levothyroxine Sodium (Synthroid -)  50 mcg PO DAILY@0700 [facility]


   Last Admin: 04/14/19 06:12 Dose:  50 mcg


Omega-3-Acid Ethyl Esters (Lovaza -)  1 gm PO BID [facility]


   Last Admin: 04/14/19 10:26 Dose:  1 gm


Pantoprazole Sodium (Protonix -)  40 mg PO DAILY [facility]


   Last Admin: 04/13/19 11:38 Dose:  Not Given


Polyethylene Glycol (Miralax (For Daily Use) -)  17 gm PO DAILY [facility]


   Last Admin: 04/13/19 11:38 Dose:  Not Given


Prednisone (Deltasone -)  40 mg PO DAILY [facility]


Senna (Senna -)  2 tab PO HS PRN


   PRN Reason: CONSTIPATION


Torsemide (Demadex -)  20 mg PO DAILY [facility]


   Last Admin: 04/13/19 11:37 Dose:  Not Given








A/P


Altered Mental Status resolved


Acute COPD Exacerbation


Pneumonia


Chronic Hypoxic Respiratory Failure


Acute on Chronic Diastolic Heart Failure


Pulmonary HTN


Atrial Fibrillation


ANNA


CKD


Hyperlipidemia


Hypothyroidism


Dementia





-  completed antibiotics


-  prednisone taper


-  O2 to keep SpO2 >90%


-  BiPAP at night and PRN during day


-  inhaled bronchodilators


-  DVT prophylaxis

## 2019-04-14 NOTE — PN
Physical Exam: 


SUBJECTIVE: Patient seen and examined, he is back to his baseline of following 

commands and eating and moving all limbs. since yesterday evening. Tolerated 

BIPAP last night 








OBJECTIVE:





 Vital Signs











 Period  Temp  Pulse  Resp  BP Sys/Gonsales  Pulse Ox


 


 Last 24 Hr  97.7 F-98.3 F  58-93  20-22  107-124/54-66  93-94








He is awake, alert to person (baseline)


moves all 4 limbs


MMM


No ocular discharge


No nasal discharge


CVS:S1S2


CTAB


Abd: BS+ nt/nd


the groin dressing is clean with no bleeding at the site





 Laboratory Results - last 24 hr











  04/13/19 04/13/19 04/13/19





  11:30 11:30 11:30


 


WBC  7.5  


 


RBC  4.75  


 


Hgb  16.2  


 


Hct  49.5 H  


 


MCV  104.3 H  


 


MCH  34.2 H  


 


MCHC  32.8  


 


RDW  14.4  


 


Plt Count  136  


 


MPV  8.1  


 


Absolute Neuts (auto)  6.3  


 


Neutrophils %  84.2 H  


 


Lymphocytes %  9.2  D  


 


Monocytes %  6.2  


 


Eosinophils %  0.3  D  


 


Basophils %  0.1  


 


Nucleated RBC %  0  


 


PTT (Actin FS)   


 


Sodium   139 


 


Potassium   4.5 


 


Chloride   95 L 


 


Carbon Dioxide   41 H 


 


Anion Gap   4 L 


 


BUN   55 H 


 


Creatinine   1.4 H 


 


Creat Clearance w eGFR   48.40 


 


Random Glucose   91 


 


Lactic Acid   


 


Calcium   8.6 


 


Ammonia    19.70














  04/13/19 04/14/19 04/14/19





  11:30 06:30 06:30


 


WBC   7.2 


 


RBC   4.55 


 


Hgb   15.3 


 


Hct   47.1 


 


MCV   103.6 H 


 


MCH   33.6 


 


MCHC   32.4 


 


RDW   14.6 


 


Plt Count   138 


 


MPV   8.0 


 


Absolute Neuts (auto)   


 


Neutrophils %   


 


Lymphocytes %   


 


Monocytes %   


 


Eosinophils %   


 


Basophils %   


 


Nucleated RBC %   


 


PTT (Actin FS)    27.8


 


Sodium   


 


Potassium   


 


Chloride   


 


Carbon Dioxide   


 


Anion Gap   


 


BUN   


 


Creatinine   


 


Creat Clearance w eGFR   


 


Random Glucose   


 


Lactic Acid  2.1 H  


 


Calcium   


 


Ammonia   








Active Medications











Generic Name Dose Route Start Last Admin





  Trade Name Freq  PRN Reason Stop Dose Admin


 


Albuterol Sulfate  1 amp  04/11/19 13:13  





  Ventolin 0.083% Nebulizer Soln -  NEB   





  Q4H PRN   





  SHORT OF BREATH/WHEEZING   





     





     





     


 


Albuterol/Ipratropium  1 amp  04/11/19 16:00  04/14/19 07:35





  Duoneb -  NEB   1 amp





  RQID GARY   Administration





     





     





     





     


 


Apixaban  2.5 mg  04/11/19 13:00  04/14/19 10:25





  Eliquis -  PO   2.5 mg





  BID GARY   Administration





     





     





     





     


 


Ascorbic Acid  500 mg  04/12/19 10:00  04/14/19 10:25





  Vitamin C -  PO   500 mg





  DAILY GARY   Administration





     





     





     





     


 


Atorvastatin Calcium  10 mg  04/11/19 22:00  04/13/19 22:01





  Lipitor -  PO   10 mg





  HS GARY   Administration





     





     





     





     


 


Carbamazepine  200 mg  04/11/19 22:00  04/13/19 22:01





  Tegretol -  PO   200 mg





  HS GARY   Administration





     





     





     





     


 


Digoxin  0.125 mg  04/12/19 10:00  04/14/19 10:25





  Lanoxin -  PO   0.125 mg





  DAILY GARY   Administration





     





     





     





     


 


Docusate Sodium  100 mg  04/11/19 14:00  04/14/19 06:12





  Colace -  PO   100 mg





  TID GARY   Administration





     





     





     





     


 


Donepezil HCl  10 mg  04/11/19 22:00  04/13/19 22:01





  Aricept -  PO   10 mg





  HS GARY   Administration





     





     





     





     


 


Dextrose/Sodium Chloride  1,000 mls @ 75 mls/hr  04/13/19 12:15  04/14/19 02:33





  D5-1/2ns -  IV   75 mls/hr





  ASDIR GARY   Administration





     





     





     





     


 


Levothyroxine Sodium  50 mcg  04/12/19 07:00  04/14/19 06:12





  Synthroid -  PO   50 mcg





  DAILY@0700 GARY   Administration





     





     





     





     


 


Omega-3-Acid Ethyl Esters  1 gm  04/11/19 22:00  04/14/19 10:26





  Lovaza -  PO   1 gm





  BID GARY   Administration





     





     





     





     


 


Pantoprazole Sodium  40 mg  04/12/19 10:00  04/13/19 11:38





  Protonix -  PO   Not Given





  DAILY [facility]   





     





     





     





     


 


Polyethylene Glycol  17 gm  04/12/19 10:00  04/13/19 11:38





  Miralax (For Daily Use) -  PO   Not Given





  DAILY [facility]   





     





     





     





     


 


Prednisone  40 mg  04/15/19 10:00  





  Deltasone -  PO   





  DAILY GARY   





     





     





     





     


 


Senna  2 tab  04/11/19 13:13  





  Senna -  PO   





  HS PRN   





  CONSTIPATION   





     





     





     


 


Torsemide  20 mg  04/12/19 10:00  04/13/19 11:37





  Demadex -  PO   Not Given





  DAILY [facility]   





     





     





     





     











ASSESSMENT/PLAN:


85 Y/O M W COPD, chronic hypoxic respiratory failure, pulmonary HTN, 

hyperlipidemia, right-sided heart failure, atrial fib/flutter, hypothyroidism, 

dementia, depression, ANNA who presented to the ED with SOB and leg swelling. 

los 14








1.AMS: brief episode yesterday morning which resolved by the evening.  HCT wnl, 

Not significantly abnormal ABG, will ask neurology to evaluate the patient. 

most likely metabolic causes but still cant R/U TIA. on aspirin and statin








2. Acute exacerbation of COPD secondary to pneumonia with chronic hypoxic 

respiratory failure, ANNA, and severe pulm HTN- clinically improved.completed 

abx course. on bipap overnight. Continue DuoNeb, albuterol as needed. Continue 

oxygen to maintain saturation >90%. pulm on board, Iv sterodis were changed to 

po from today.








3. 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 eliquis re-

started. will need [facility] outpatient follow up





4. Acute on chronic right-sided heart failure. now euovolemic. on home dose of 

toresemine. cardio on board





5. Permanent atrial fib- Continue Digoxin and eliquis





6. Hyperlipidemia- Continue Lipitor, Lovaza


7. Hypothyroidism- Continue Synthroid


8. Depression- Continue Tegretol


9. Dementia- Continue Aricept


10. Stage 3 CKD- Stable


11: Dispo: was supposed to go to [facility],.





Visit type





- Emergency Visit


Emergency Visit: Yes


ED Registration Date: 03/30/19


Care time: The patient presented to the Emergency Department on the above date 

and was hospitalized for further evaluation of their emergent condition.





- New Patient


This patient is new to me today: No





- Critical Care


Critical Care patient: No





- Discharge Referral


Referred to [facility] No

## 2019-04-15 LAB
DEPRECATED RDW RBC AUTO: 14.3 % (ref 11.9–15.9)
HCT VFR BLD CALC: 44.5 % (ref 35.4–49)
HGB BLD-MCNC: 14.6 GM/DL (ref 11.7–16.9)
MCH RBC QN AUTO: 33.6 PG (ref 25.7–33.7)
MCHC RBC AUTO-ENTMCNC: 32.9 G/DL (ref 32–35.9)
MCV RBC: 102.3 FL (ref 80–96)
PLATELET # BLD AUTO: 129 K/MM3 (ref 134–434)
PMV BLD: 8 FL (ref 7.5–11.1)
RBC # BLD AUTO: 4.35 M/MM3 (ref 4–5.6)
WBC # BLD AUTO: 7.9 K/MM3 (ref 4–10)

## 2019-04-15 PROCEDURE — 4A00X4Z MEASUREMENT OF CENTRAL NERVOUS ELECTRICAL ACTIVITY, EXTERNAL APPROACH: ICD-10-PCS | Performed by: PSYCHIATRY & NEUROLOGY

## 2019-04-15 RX ADMIN — DONEPEZIL HYDROCHLORIDE SCH MG: 10 TABLET, FILM COATED ORAL at 21:53

## 2019-04-15 RX ADMIN — IPRATROPIUM BROMIDE AND ALBUTEROL SULFATE SCH AMP: .5; 3 SOLUTION RESPIRATORY (INHALATION) at 07:35

## 2019-04-15 RX ADMIN — IPRATROPIUM BROMIDE AND ALBUTEROL SULFATE SCH AMP: .5; 3 SOLUTION RESPIRATORY (INHALATION) at 15:56

## 2019-04-15 RX ADMIN — OXYCODONE HYDROCHLORIDE AND ACETAMINOPHEN SCH MG: 500 TABLET ORAL at 09:31

## 2019-04-15 RX ADMIN — PANTOPRAZOLE SODIUM SCH MG: 40 TABLET, DELAYED RELEASE ORAL at 09:31

## 2019-04-15 RX ADMIN — POLYETHYLENE GLYCOL 3350 SCH GM: 17 POWDER, FOR SOLUTION ORAL at 09:31

## 2019-04-15 RX ADMIN — APIXABAN SCH MG: 2.5 TABLET, FILM COATED ORAL at 09:31

## 2019-04-15 RX ADMIN — DOCUSATE SODIUM SCH MG: 100 CAPSULE, LIQUID FILLED ORAL at 21:53

## 2019-04-15 RX ADMIN — LEVOTHYROXINE SODIUM SCH: 50 TABLET ORAL at 06:23

## 2019-04-15 RX ADMIN — OMEGA-3-ACID ETHYL ESTERS SCH GM: 1 CAPSULE, LIQUID FILLED ORAL at 09:31

## 2019-04-15 RX ADMIN — IPRATROPIUM BROMIDE AND ALBUTEROL SULFATE SCH AMP: .5; 3 SOLUTION RESPIRATORY (INHALATION) at 11:15

## 2019-04-15 RX ADMIN — ATORVASTATIN CALCIUM SCH MG: 10 TABLET, FILM COATED ORAL at 21:52

## 2019-04-15 RX ADMIN — DEXTROSE AND SODIUM CHLORIDE SCH MLS/HR: 5; 450 INJECTION, SOLUTION INTRAVENOUS at 05:38

## 2019-04-15 RX ADMIN — DIGOXIN SCH MG: 125 TABLET ORAL at 09:31

## 2019-04-15 RX ADMIN — DOCUSATE SODIUM SCH MG: 100 CAPSULE, LIQUID FILLED ORAL at 14:10

## 2019-04-15 RX ADMIN — PREDNISONE SCH MG: 20 TABLET ORAL at 09:31

## 2019-04-15 RX ADMIN — DOCUSATE SODIUM SCH MG: 100 CAPSULE, LIQUID FILLED ORAL at 05:45

## 2019-04-15 RX ADMIN — DEXTROSE AND SODIUM CHLORIDE SCH: 5; 450 INJECTION, SOLUTION INTRAVENOUS at 12:35

## 2019-04-15 RX ADMIN — IPRATROPIUM BROMIDE AND ALBUTEROL SULFATE SCH AMP: .5; 3 SOLUTION RESPIRATORY (INHALATION) at 19:45

## 2019-04-15 RX ADMIN — APIXABAN SCH MG: 2.5 TABLET, FILM COATED ORAL at 21:52

## 2019-04-15 RX ADMIN — LEVOTHYROXINE SODIUM SCH MCG: 50 TABLET ORAL at 05:46

## 2019-04-15 RX ADMIN — ALUMINUM HYDROXIDE, MAGNESIUM HYDROXIDE, AND SIMETHICONE PRN ML: 200; 200; 20 SUSPENSION ORAL at 12:05

## 2019-04-15 RX ADMIN — OMEGA-3-ACID ETHYL ESTERS SCH GM: 1 CAPSULE, LIQUID FILLED ORAL at 21:53

## 2019-04-15 NOTE — PN
Progress Note (short form)





- Note


Progress Note: 





s: no cp, palps, dizziness, dyspnea


  Current Medications





Al Hydroxide/Mg Hydroxide (Mylanta Oral Suspension -)  30 ml PO Q6H PRN


   PRN Reason: DYSPEPSIA


   Last Admin: 04/15/19 12:05 Dose:  30 ml


Albuterol Sulfate (Ventolin 0.083% Nebulizer Soln -)  1 amp NEB Q4H PRN


   PRN Reason: SHORT OF BREATH/WHEEZING


Albuterol/Ipratropium (Duoneb -)  1 amp NEB RQID [facility]


   Last Admin: 04/15/19 11:15 Dose:  1 amp


Apixaban (Eliquis -)  2.5 mg PO BID [facility]


   Last Admin: 04/15/19 09:31 Dose:  2.5 mg


Ascorbic Acid (Vitamin C -)  500 mg PO DAILY [facility]


   Last Admin: 04/15/19 09:31 Dose:  500 mg


Atorvastatin Calcium (Lipitor -)  10 mg PO HS [facility]


   Last Admin: 04/14/19 22:00 Dose:  10 mg


Carbamazepine (Tegretol -)  200 mg PO [facility]


   Last Admin: 04/14/19 23:15 Dose:  200 mg


Digoxin (Lanoxin -)  0.125 mg PO DAILY [facility]


   Last Admin: 04/15/19 09:31 Dose:  0.125 mg


Docusate Sodium (Colace -)  100 mg PO TID [facility]


   Last Admin: 04/15/19 14:10 Dose:  100 mg


Donepezil HCl (Aricept -)  10 mg PO [facility]


   Last Admin: 04/14/19 22:00 Dose:  10 mg


Dextrose/Sodium Chloride (D5-1/2ns -)  1,000 mls @ 75 mls/hr IV ASDIR [facility]


   Last Admin: 04/15/19 12:35 Dose:  Not Given


Levothyroxine Sodium (Synthroid -)  50 mcg PO DAILY@0700 [facility]


   Last Admin: 04/15/19 06:23 Dose:  Not Given


Omega-3-Acid Ethyl Esters (Lovaza -)  1 gm PO BID [facility]


   Last Admin: 04/15/19 09:31 Dose:  1 gm


Pantoprazole Sodium (Protonix -)  40 mg PO DAILY [facility]


   Last Admin: 04/15/19 09:31 Dose:  40 mg


Polyethylene Glycol (Miralax (For Daily Use) -)  17 gm PO DAILY [facility]


   Last Admin: 04/15/19 09:31 Dose:  17 gm


Prednisone (Deltasone -)  40 mg PO DAILY [facility]


   Last Admin: 04/15/19 09:31 Dose:  40 mg


Senna (Senna -)  2 tab PO HS PRN


   PRN Reason: CONSTIPATION


Torsemide (Demadex -)  20 mg PO DAILY [facility]


   Last Admin: 04/13/19 11:37 Dose:  Not Given





 Vital Signs











 Period  Temp  Pulse  Resp  BP Sys/Gonsales  Pulse Ox


 


 Last 24 Hr  97.2 F-98.2 F  56-74  20-20  109-138/60-79  











Constitutional: Yes: No Distress, Calm


Eyes: No: Sclera Icterus


HENT: No: Nasal Congestion


Cardiovascular: Yes: Pulse Irregular, S1, S2, Other (PMI non diplaced).  No: JVD

, Gallop, Murmur


Respiratory: Yes: CTA Bilaterally (very decr diffusely).  No: Accessory Muscle 

Use, Rales, Wheezes


Gastrointestinal: Yes: Normal Bowel Sounds, Soft.  No: Tenderness


Musculoskeletal: Yes: Other (No kyphosis)


Extremities: No: Cold, Cyanosis


Edema: No


Integumentary: No: Jaundice


Neurological: Yes: Alert.  No: Seizure


Psychiatric: No: Agitated








Assessment/Plan








Pharm nuc stress 2/18/16: Afib with RVR. Asx. No ischemic changes. Nl perf. Nl 

EF. 





CXR: no congestion





CT chest: extensive COPD changes





EKG: sinus, PACs, afib





RLE ultrasound, no DVT, R popliteal artery aneurysm





Echo 4/2019 low nl LV function, mildly reduced RV function, mildly dilated RA, 

mild MAC, mod TR, PASP at least 70 mmHg, mild AR, borderline aortic root 

dilation





tele: satr flutter, HRs good, richard o/n








a/p:


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 

on coronary calcifications on chest CT, stable aortic dilation of the ascending 

and abdominal aorta, HTN/HL, remote history of DVT, afib on eliquis p/w SOB, 

cough.





SOB, cough, COPD


- CT with significant findings for COPD, receiving nebs and steroids per primary

, pulm





acute Right heart failure, pulm HTN


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


- on home O2, follows with dr navarro for tx of sleep apnea


- BNP >11,000 


- echo shows severe pulm HTN, low normal RV function--stable long time


- received lasix 40 mg IV x 1, now on 80 mg IV BID - has abd distension, which 

he has had in the past with volume overload


4/6: vol status improved, cr rising, will change to oral torsemide now. 


4/7: cont torsemide 40 bid, monitor Cr, lytes, daily standing weights.


-4/8: bun/creat trending up, weights very variable, neck veins flat--pt likely 

overdiuresed/hypovolemic. change torsemide 40 bid to 20 qd, observe for LE 

edema (reliably his HF sx).


-4/9-10, 4/12: BUN/Cr stable continue torsemide 20 mg daliy


-4/15: torsemide held for dehydration, would resume when PO intake improves





Atrial fibrillation. 


-rate ok, cont digoxin as doing (level good)


-on metoprolol at home as well, which he has tolerated well--held here and HR 

good, observe. defer CCB to avoid exacerbating RV contractility issues


-was on heparin gtt preop, eliquis restarted





popliteal artery aneurysm


- s/p covered stent placement





HL: 


- cont statin

## 2019-04-15 NOTE — PN
Progress Note, Physician


Chief Complaint: 





C/O  epigastric burning respiratory stasus is at base line





- Current Medication List


Current Medications: 


Active Medications





Albuterol Sulfate (Ventolin 0.083% Nebulizer Soln -)  1 amp NEB Q4H PRN


   PRN Reason: SHORT OF BREATH/WHEEZING


Albuterol/Ipratropium (Duoneb -)  1 amp NEB RQID [facility]


   Last Admin: 04/15/19 07:35 Dose:  1 amp


Apixaban (Eliquis -)  2.5 mg PO BID [facility]


   Last Admin: 04/15/19 09:31 Dose:  2.5 mg


Ascorbic Acid (Vitamin C -)  500 mg PO DAILY [facility]


   Last Admin: 04/15/19 09:31 Dose:  500 mg


Atorvastatin Calcium (Lipitor -)  10 mg PO HS [facility]


   Last Admin: 04/14/19 22:00 Dose:  10 mg


Carbamazepine (Tegretol -)  200 mg PO HS [facility]


   Last Admin: 04/14/19 23:15 Dose:  200 mg


Digoxin (Lanoxin -)  0.125 mg PO DAILY [facility]


   Last Admin: 04/15/19 09:31 Dose:  0.125 mg


Docusate Sodium (Colace -)  100 mg PO TID [facility]


   Last Admin: 04/15/19 05:45 Dose:  100 mg


Donepezil HCl (Aricept -)  10 mg PO HS [facility]


   Last Admin: 04/14/19 22:00 Dose:  10 mg


Dextrose/Sodium Chloride (D5-1/2ns -)  1,000 mls @ 75 mls/hr IV ASDIR [facility]


   Last Admin: 04/15/19 05:38 Dose:  75 mls/hr


Levothyroxine Sodium (Synthroid -)  50 mcg PO DAILY@0700 [facility]


   Last Admin: 04/15/19 06:23 Dose:  Not Given


Omega-3-Acid Ethyl Esters (Lovaza -)  1 gm PO BID [facility]


   Last Admin: 04/15/19 09:31 Dose:  1 gm


Pantoprazole Sodium (Protonix -)  40 mg PO DAILY [facility]


   Last Admin: 04/15/19 09:31 Dose:  40 mg


Polyethylene Glycol (Miralax (For Daily Use) -)  17 gm PO DAILY [facility]


   Last Admin: 04/15/19 09:31 Dose:  17 gm


Prednisone (Deltasone -)  40 mg PO DAILY [facility]


   Last Admin: 04/15/19 09:31 Dose:  40 mg


Senna (Senna -)  2 tab PO HS PRN


   PRN Reason: CONSTIPATION


Torsemide (Demadex -)  20 mg PO DAILY [facility]


   Last Admin: 04/13/19 11:37 Dose:  Not Given











- Objective


Vital Signs: 


 Vital Signs











Temperature  98.1 F   04/15/19 08:00


 


Pulse Rate  68   04/15/19 09:31


 


Respiratory Rate  20   04/15/19 08:00


 


Blood Pressure  122/71   04/15/19 08:00


 


O2 Sat by Pulse Oximetry (%)  100   04/15/19 09:00











Elderly man not in acute distress





HEENT: Mm moist, no anemia





NECK: No JVD No Bruit





CHEST: CTA B/L





CVS: s1s@ R no m/g/r





ABD: mild epigastric tenderness BS +





EXT: Trace edema  feet, no calf tenderness, Pulses +





CNS: Non focal











Labs: 


 CBC, BMP





 04/15/19 05:54 





 04/13/19 11:30 





 INR, PTT











INR  1.05  (0.83-1.09)   04/09/19  05:15    














Problem List





- Problems


(1) COPD with acute exacerbation


Assessment/Plan: 


on Nebulizers and ICS , respiratory evaluted the patient on Po prednisone PRN 

BIPAP cleared to [facility] Home


Code(s): J44.1 - CHRONIC OBSTRUCTIVE PULMONARY DISEASE W (ACUTE) EXACERBATION   





(2) Acute respiratory failure with hypoxia and hypercarbia


Assessment/Plan: 


Due to COPd exacerbation now improving


Code(s): J96.01 - ACUTE RESPIRATORY FAILURE WITH HYPOXIA; J96.02 - ACUTE 

RESPIRATORY FAILURE WITH HYPERCAPNIA   





(3) Atrial flutter


Assessment/Plan: 


on rate control and AC with eliquis


Code(s): I48.92 - UNSPECIFIED ATRIAL FLUTTER   


Qualifiers: 


   Atrial flutter type: unspecified   Qualified Code(s): I48.92 - Unspecified 

atrial flutter   





(4) Dementia


Assessment/Plan: 


Chronic cont aricept


Code(s): F03.90 - UNSPECIFIED DEMENTIA WITHOUT BEHAVIORAL DISTURBANCE   


Qualifiers: 


   Dementia type: unspecified type   Dementia behavioral disturbance: without 

behavioral disturbance   Qualified Code(s): F03.90 - Unspecified dementia 

without behavioral disturbance   





(5) Hypertension


Assessment/Plan: 


Well controlled


Code(s): I10 - ESSENTIAL (PRIMARY) HYPERTENSION   


Qualifiers: 


   Hypertension type: essential hypertension   Qualified Code(s): I10 - 

Essential (primary) hypertension   





(6) Hypothyroidism


Code(s): E03.9 - HYPOTHYROIDISM, UNSPECIFIED   


Qualifiers: 


   Hypothyroidism type: unspecified   Qualified Code(s): E03.9 - Hypothyroidism

, unspecified   





(7) Hyperlipidemia


Assessment/Plan: 


On Statin


Code(s): E78.5 - HYPERLIPIDEMIA, UNSPECIFIED   


Qualifiers: 


   Hyperlipidemia type: unspecified   Qualified Code(s): E78.5 - Hyperlipidemia

, unspecified   





(8) Transient confusion


Assessment/Plan: 


H/O seizyures evaluted by Pulmonary consult recommonded EEG


Code(s): R41.0 - DISORIENTATION, UNSPECIFIED

## 2019-04-15 NOTE — PN
Progress Note (short form)





- Note


Progress Note: 


Resting in NAD on NC O2. 


Awake and alert, but mildly confused. 


Reports breathing is better. 


Residual dry cough. No fevers recorded.





 Intake & Output











 04/12/19 04/13/19 04/14/19 04/15/19





 23:59 23:59 23:59 23:59


 


Intake Total 7145 446 5807 200


 


Balance 1390 747 0695 200


 


Weight   144 lb 3.2 oz 





 Last Vital Signs











Temp Pulse Resp BP Pulse Ox


 


 98.2 F   68   20   138/79   95 


 


 04/15/19 04:00  04/15/19 09:31  04/15/19 04:00  04/15/19 04:00  04/14/19 21:00








Active Medications





Albuterol Sulfate (Ventolin 0.083% Nebulizer Soln -)  1 amp NEB Q4H PRN


   PRN Reason: SHORT OF BREATH/WHEEZING


Albuterol/Ipratropium (Duoneb -)  1 amp NEB RQID [facility]


   Last Admin: 04/15/19 07:35 Dose:  1 amp


Apixaban (Eliquis -)  2.5 mg PO BID [facility]


   Last Admin: 04/15/19 09:31 Dose:  2.5 mg


Ascorbic Acid (Vitamin C -)  500 mg PO DAILY [facility]


   Last Admin: 04/15/19 09:31 Dose:  500 mg


Atorvastatin Calcium (Lipitor -)  10 mg PO HS [facility]


   Last Admin: 04/14/19 22:00 Dose:  10 mg


Carbamazepine (Tegretol -)  200 mg PO HS [facility]


   Last Admin: 04/14/19 23:15 Dose:  200 mg


Digoxin (Lanoxin -)  0.125 mg PO DAILY [facility]


   Last Admin: 04/15/19 09:31 Dose:  0.125 mg


Docusate Sodium (Colace -)  100 mg PO TID [facility]


   Last Admin: 04/15/19 05:45 Dose:  100 mg


Donepezil HCl (Aricept -)  10 mg PO HS [facility]


   Last Admin: 04/14/19 22:00 Dose:  10 mg


Dextrose/Sodium Chloride (D5-1/2ns -)  1,000 mls @ 75 mls/hr IV ASDIR [facility]


   Last Admin: 04/15/19 05:38 Dose:  75 mls/hr


Levothyroxine Sodium (Synthroid -)  50 mcg PO DAILY@0700 [facility]


   Last Admin: 04/15/19 06:23 Dose:  Not Given


Omega-3-Acid Ethyl Esters (Lovaza -)  1 gm PO BID [facility]


   Last Admin: 04/15/19 09:31 Dose:  1 gm


Pantoprazole Sodium (Protonix -)  40 mg PO DAILY [facility]


   Last Admin: 04/15/19 09:31 Dose:  40 mg


Polyethylene Glycol (Miralax (For Daily Use) -)  17 gm PO DAILY [facility]


   Last Admin: 04/15/19 09:31 Dose:  17 gm


Prednisone (Deltasone -)  40 mg PO DAILY [facility]


   Last Admin: 04/15/19 09:31 Dose:  40 mg


Senna (Senna -)  2 tab PO HS PRN


   PRN Reason: CONSTIPATION


Torsemide (Demadex -)  20 mg PO DAILY [facility]


   Last Admin: 04/13/19 11:37 Dose:  Not Given











Gen:  NAD at rest


Heart:  RRR


Lung: decreased breath sounds at the bases


Abd: soft, nontender


Ext: no edema





 Laboratory Results - last 24 hr











  04/15/19 04/15/19





  05:54 05:54


 


WBC  7.9 


 


RBC  4.35 


 


Hgb  14.6 


 


Hct  44.5 


 


MCV  102.3 H 


 


MCH  33.6 


 


MCHC  32.9 


 


RDW  14.3 


 


Plt Count  129 L 


 


MPV  8.0 


 


PTT (Actin FS)   28.1

















A/P


Altered Mental Status resolved


Acute COPD Exacerbation


Pneumonia


Chronic Hypoxic Respiratory Failure


Acute on Chronic Diastolic Heart Failure


Pulmonary HTN


Atrial Fibrillation


ANNA


CKD


Hyperlipidemia


Hypothyroidism


Dementia





-  completed antibiotics


-  prednisone taper


-  O2 to keep SpO2 >90%


-  NIPPV QHS and PRN during day


-  inhaled bronchodilators


-  DVT prophylaxis


-  D/C planning 





Dr Flor 











Problem List





- Problems


(1) Community acquired pneumonia


Code(s): J18.9 - PNEUMONIA, UNSPECIFIED ORGANISM   





(2) Acute respiratory acidosis


Code(s): E87.2 - ACIDOSIS   





(3) Acute respiratory failure with hypoxia and hypercarbia


Code(s): J96.01 - ACUTE RESPIRATORY FAILURE WITH HYPOXIA; J96.02 - ACUTE 

RESPIRATORY FAILURE WITH HYPERCAPNIA   





(4) Aneurysm of right popliteal artery


Code(s): I72.4 - ANEURYSM OF ARTERY OF LOWER EXTREMITY   





(5) COPD with acute exacerbation


Code(s): J44.1 - CHRONIC OBSTRUCTIVE PULMONARY DISEASE W (ACUTE) EXACERBATION   





(6) Chronic respiratory acidosis


Code(s): E87.2 - ACIDOSIS   





(7) Leg edema, right


Code(s): R60.0 - LOCALIZED EDEMA   





(8) Right ventricular systolic dysfunction


Code(s): I51.9 - HEART DISEASE, UNSPECIFIED   





(9) Atrial flutter


Code(s): I48.92 - UNSPECIFIED ATRIAL FLUTTER   


Qualifiers: 


   Atrial flutter type: unspecified   Qualified Code(s): I48.92 - Unspecified 

atrial flutter   





(10) COPD (chronic obstructive pulmonary disease)


Code(s): J44.9 - CHRONIC OBSTRUCTIVE PULMONARY DISEASE, UNSPECIFIED   


Qualifiers: 


   COPD type: COPD with acute exacerbation   Qualified Code(s): J44.1 - Chronic 

obstructive pulmonary disease with (acute) exacerbation   





(11) Chronic cor pulmonale


Code(s): I27.81 - COR PULMONALE (CHRONIC)   





(12) Dementia


Code(s): F03.90 - UNSPECIFIED DEMENTIA WITHOUT BEHAVIORAL DISTURBANCE   


Qualifiers: 


   Dementia type: unspecified type   Dementia behavioral disturbance: without 

behavioral disturbance   Qualified Code(s): F03.90 - Unspecified dementia 

without behavioral disturbance   





(13) Hyperlipidemia


Code(s): E78.5 - HYPERLIPIDEMIA, UNSPECIFIED   


Qualifiers: 


   Hyperlipidemia type: unspecified   Qualified Code(s): E78.5 - Hyperlipidemia

, unspecified   





(14) Hypertension


Code(s): I10 - ESSENTIAL (PRIMARY) HYPERTENSION   


Qualifiers: 


   Hypertension type: essential hypertension   Qualified Code(s): I10 - 

Essential (primary) hypertension   





(15) Hypothyroidism


Code(s): E03.9 - HYPOTHYROIDISM, UNSPECIFIED   


Qualifiers: 


   Hypothyroidism type: unspecified   Qualified Code(s): E03.9 - Hypothyroidism

, unspecified   





(16) Obstructive sleep apnea


Code(s): G47.33 - OBSTRUCTIVE SLEEP APNEA (ADULT) (PEDIATRIC)   





(17) Pulmonary hypertension


Code(s): I27.2 - OTHER SECONDARY PULMONARY HYPERTENSION * DO NOT USE *

## 2019-04-16 VITALS — DIASTOLIC BLOOD PRESSURE: 72 MMHG | SYSTOLIC BLOOD PRESSURE: 131 MMHG

## 2019-04-16 VITALS — HEART RATE: 68 BPM

## 2019-04-16 VITALS — TEMPERATURE: 97.8 F

## 2019-04-16 LAB
ANION GAP SERPL CALC-SCNC: 4 MMOL/L (ref 8–16)
BASOPHILS # BLD: 0.2 % (ref 0–2)
BUN SERPL-MCNC: 32 MG/DL (ref 7–18)
CALCIUM SERPL-MCNC: 8.3 MG/DL (ref 8.5–10.1)
CHLORIDE SERPL-SCNC: 101 MMOL/L (ref 98–107)
CO2 SERPL-SCNC: 33 MMOL/L (ref 21–32)
CREAT SERPL-MCNC: 1 MG/DL (ref 0.55–1.3)
DEPRECATED RDW RBC AUTO: 13.9 % (ref 11.9–15.9)
EOSINOPHIL # BLD: 1.1 % (ref 0–4.5)
GLUCOSE SERPL-MCNC: 80 MG/DL (ref 74–106)
HCT VFR BLD CALC: 42.6 % (ref 35.4–49)
HGB BLD-MCNC: 14.8 GM/DL (ref 11.7–16.9)
LYMPHOCYTES # BLD: 11.2 % (ref 8–40)
MCH RBC QN AUTO: 35 PG (ref 25.7–33.7)
MCHC RBC AUTO-ENTMCNC: 34.8 G/DL (ref 32–35.9)
MCV RBC: 100.6 FL (ref 80–96)
MONOCYTES # BLD AUTO: 5.1 % (ref 3.8–10.2)
NEUTROPHILS # BLD: 82.4 % (ref 42.8–82.8)
PLATELET # BLD AUTO: 134 K/MM3 (ref 134–434)
PMV BLD: 8 FL (ref 7.5–11.1)
POTASSIUM SERPLBLD-SCNC: 4.2 MMOL/L (ref 3.5–5.1)
RBC # BLD AUTO: 4.24 M/MM3 (ref 4–5.6)
SODIUM SERPL-SCNC: 137 MMOL/L (ref 136–145)
WBC # BLD AUTO: 8.9 K/MM3 (ref 4–10)

## 2019-04-16 RX ADMIN — POLYETHYLENE GLYCOL 3350 SCH GM: 17 POWDER, FOR SOLUTION ORAL at 09:35

## 2019-04-16 RX ADMIN — DOCUSATE SODIUM SCH MG: 100 CAPSULE, LIQUID FILLED ORAL at 05:37

## 2019-04-16 RX ADMIN — TORSEMIDE SCH MG: 20 TABLET ORAL at 09:35

## 2019-04-16 RX ADMIN — ALUMINUM HYDROXIDE, MAGNESIUM HYDROXIDE, AND SIMETHICONE PRN ML: 200; 200; 20 SUSPENSION ORAL at 09:43

## 2019-04-16 RX ADMIN — APIXABAN SCH MG: 2.5 TABLET, FILM COATED ORAL at 09:35

## 2019-04-16 RX ADMIN — LEVOTHYROXINE SODIUM SCH MCG: 50 TABLET ORAL at 06:06

## 2019-04-16 RX ADMIN — PANTOPRAZOLE SODIUM SCH MG: 40 TABLET, DELAYED RELEASE ORAL at 09:35

## 2019-04-16 RX ADMIN — IPRATROPIUM BROMIDE AND ALBUTEROL SULFATE SCH AMP: .5; 3 SOLUTION RESPIRATORY (INHALATION) at 07:40

## 2019-04-16 RX ADMIN — PREDNISONE SCH MG: 20 TABLET ORAL at 09:35

## 2019-04-16 RX ADMIN — OMEGA-3-ACID ETHYL ESTERS SCH GM: 1 CAPSULE, LIQUID FILLED ORAL at 09:35

## 2019-04-16 RX ADMIN — ALUMINUM HYDROXIDE, MAGNESIUM HYDROXIDE, AND SIMETHICONE PRN ML: 200; 200; 20 SUSPENSION ORAL at 01:31

## 2019-04-16 RX ADMIN — DIGOXIN SCH MG: 125 TABLET ORAL at 09:35

## 2019-04-16 RX ADMIN — OXYCODONE HYDROCHLORIDE AND ACETAMINOPHEN SCH MG: 500 TABLET ORAL at 09:35

## 2019-04-16 NOTE — DS
Physical Exam: 


SUBJECTIVE: Patient seen and examined at the bedside.  in no acute distress.  


needs his denture past to eat, but states he is hungry.  no nausea.  abdominal 

pain better after having a bowel movement.


tells me he is having alot of gas.





OBJECTIVE:


eeg resulted as abnormal, neuro aware, OK to discharge to rehab.


 Vital Signs











 Period  Temp  Pulse  Resp  BP Sys/Gonsales  Pulse Ox


 


 Last 24 Hr  97.2 F-98.4 F  49-81  20-20  120-147/67-84  97-99








PHYSICAL EXAM





GENERAL: The patient is awake, alert and in no acute distress. episodes of 

confusion


HEAD: small open area on anterior head. healedl


EYES: PERRL, sclera anicteric, conjunctiva clear. 


ORAL: moist mucous membranes


ENT: nares patent


NECK: Trachea midline, no JVD


LUNGS: lung sounds diminished at the bases. no wheezing or rhonchi. no 

accessory muscle use. 


HEART: Regular rate and rhythm


ABDOMEN: Soft, mild tender on LLQ, but states improved since having a BM, 

nondistended, normoactive bowel sounds, no guarding


EXTREMITIES: 2+ pulses, warm, well-perfused, RLE calf edema, venous stasis 

changes


NEUROLOGICAL: AAOX2 Normal speech


PSYCH: Normal mood, normal affect.


SKIN: Warm, dry, normal turgor





LABS


 Laboratory Results - last 24 hr











  04/16/19 04/16/19 04/16/19





  06:00 06:00 06:00


 


WBC   8.9 


 


RBC   4.24 


 


Hgb   14.8 


 


Hct   42.6 


 


MCV   100.6 H 


 


MCH   35.0 H 


 


MCHC   34.8 


 


RDW   13.9 


 


Plt Count   134 


 


MPV   8.0 


 


Absolute Neuts (auto)   7.3 


 


Neutrophils %   82.4 


 


Lymphocytes %   11.2  D 


 


Monocytes %   5.1 


 


Eosinophils %   1.1  D 


 


Basophils %   0.2 


 


Nucleated RBC %   0 


 


PTT (Actin FS)  27.4  


 


Sodium    137


 


Potassium    4.2


 


Chloride    101


 


Carbon Dioxide    33 H


 


Anion Gap    4 L


 


BUN    32 H


 


Creatinine    1.0


 


Creat Clearance w eGFR    71.36


 


Random Glucose    80


 


Calcium    8.3 L











HOSPITAL COURSE:





Date of Admission:03/30/19





Date of Discharge: 04/16/19





Patient is a 84 year old male with a significant past medical history of  COPD (

home oxygen dependent @ 2 liters), current daily smoker (quit 1 week ago) 

pulmonary hypertension, HLD, right sided CHF, atrial Flutter (on eliquis) 

hypothyroidism, dementia, depression, ANNA admitted with resp insufficiency and 

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 

vascular surgery. 





Pulmonary


COPD exacerbation, resolved.


Now back on nasal cannula at 2-3 liters (his home baseline) with stable 

saturations between 88-92%. S/P solumedrol taper for shortness of breath.  Now 

on Prednisone with outpatient taper.  continue bipap.  confirmed with SW that 

patient has bipap available at the rehab facility.





Vascular:


Aneurysm of right popliteal artery.  Patient is s/p right stenting for right 

popliteal artery aneurysm on 4/11/19.  Vascular follow up as an outpatient. 





Card:


CHF. on demadex.  Echo done 4/1: mod TR, EF 50-55%, no pericardial effusion





Afib. On digoxin 0.125mg daily, eliquis 5mg bid


 


HLD. On lipitor 10mg qhs, lovaza





Renal: JEMAL, resolved.  will need outpatient follow up.  





Psyche:


Dementia


On aricept





Hypothyroidism


On synthroid 50mcg daily





Minutes to complete discharge: 60





Discharge Summary


Reason For Visit: COPD EXACERBATION/CHF


Current Active Problems





Acute respiratory acidosis (Acute)


Acute respiratory failure with hypoxia and hypercarbia (Acute)


Aneurysm of right popliteal artery (Acute)


CHF exacerbation (Acute)


COPD with acute exacerbation (Acute)


Chronic respiratory acidosis (Acute)


Community acquired pneumonia (Acute)


Leg edema, right (Acute)


Right ventricular systolic dysfunction (Acute)


Systolic heart failure (Acute)


Transient confusion (Acute)








Condition: Guarded





- Instructions


Diet, Activity, Other Instructions: 


Mr. Hudson/Facility staff:





You were admitted due to exacerbation of your COPD and Right sided Pneumonia. 

You were treated with steroids and antibiotics and improved. You should have a 

repeat CXR in 4-6 weeks to ensure your pneumonia has cleared.  





Here are our recommendations:





COPD acute exacerbation.  resolved.


Continue the Prednisone 40mg daily and taper down by 10mg every 3 days until 

tapered off.


on bipap overnight. Continue DuoNeb, albuterol as needed. Continue oxygen to 

maintain saturation >90%. 


On baseline 2 liters of nasal cannula (home baseline) and Bipap at night.





Atrial flutter


On rate control and anticoagulate with eliquis.  On digoxin.





Vascular:


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 eliquis re-

started. will need [facility] outpatient follow up (Dr. Taylor)





Dementia/transient confusion


continue Aricept


EEG completed, seen by neurologist and cleared for discharge





Constipation


Keep on daily bowel regimen





Acute on chronic heart failaure 


On home dose of Toresemide, will need cardiology follow up as an outpatient.





Follow up with Dr. Taylor as patient had a recent right stent placement for 

right popliteal artery aneurysm.














Referrals: 


Sebastian Rodriguez MD [Staff Physician] - 


Darrion Myrick MD [Staff Physician] - 


Justus Taylor DO [Staff Physician] - 


Disposition: SKILLED NURSING FACILITY





- Home Medications


Comprehensive Discharge Medication List: 


Ambulatory Orders





Carbamazepine 200 mg PO HS 03/30/19 


Apixaban [Eliquis -] 2.5 mg PO BID  tablet 04/12/19 


Omega-3 Acid Ethyl Esters [Lovaza -] 1 gm PO BID  cap 04/12/19 


Pantoprazole Sodium [Protonix -] 40 mg PO DAILY  tablet.ec 04/12/19 


Sennosides [Senna -] 2 tab PO HS PRN  tablet 04/12/19 


Albuterol 0.083% Nebulizer Sol [Ventolin 0.083% Nebulizer Soln -] 1 amp NEB Q4H 

PRN  amp 04/16/19 


Albuterol 2.5/Ipratropium 0.5 [Duoneb -] 1 amp NEB RQID  amp 04/16/19 


Docusate Sodium [Colace -] 100 mg PO TID  capsule 04/16/19 


Mag Hydrox/Al Hydrox/Simeth [Mylanta Oral Suspension -] 30 ml PO Q6H PRN  cup 04 /16/19 


Polyethylene Glycol 3350 [Miralax 119 gm Btl -] 17 gm PO DAILY  bottle 04/16/19 


Torsemide [Demadex -] 20 mg PO DAILY  tablet 04/16/19 


predniSONE [Deltasone -] 40 mg PO DAILY  tablet 04/16/19 








This patient is new to me today: No


Emergency Visit: Yes


ED Registration Date: 03/30/19


Care time: The patient presented to the Emergency Department on the above date 

and was hospitalized for further evaluation of their emergent condition.


Critical Care patient: No





- Discharge Referral


Referred to [facility] No

## 2019-04-16 NOTE — PN
Progress Note (short form)





- Note


Progress Note: 


Resting in NAD on 4 L NC O2. 


Awake and alert, but mildly confused. 


Reports breathing is better. 


Residual dry cough. No fevers recorded.





 Intake & Output











 04/13/19 04/14/19 04/15/19 04/16/19





 23:59 23:59 23:59 23:59


 


Intake Total 875 2629 1737 925


 


Output Total   350 


 


Balance 875 2629 1387 925


 


Weight  144 lb 3.2 oz  144 lb 14.4 oz








 Last Vital Signs











Temp Pulse Resp BP Pulse Ox


 


 97.8 F   68   20   131/72   99 


 


 04/16/19 09:00  04/16/19 09:35  04/16/19 09:00  04/16/19 09:00  04/16/19 09:00








Active Medications





Al Hydroxide/Mg Hydroxide (Mylanta Oral Suspension -)  30 ml PO Q6H PRN


   PRN Reason: DYSPEPSIA


   Last Admin: 04/16/19 09:43 Dose:  30 ml


Albuterol Sulfate (Ventolin 0.083% Nebulizer Soln -)  1 amp NEB Q4H PRN


   PRN Reason: SHORT OF BREATH/WHEEZING


Albuterol/Ipratropium (Duoneb -)  1 amp NEB RQID [facility]


   Last Admin: 04/16/19 07:40 Dose:  1 amp


Apixaban (Eliquis -)  2.5 mg PO BID [facility]


   Last Admin: 04/16/19 09:35 Dose:  2.5 mg


Ascorbic Acid (Vitamin C -)  500 mg PO DAILY [facility]


   Last Admin: 04/16/19 09:35 Dose:  500 mg


Atorvastatin Calcium (Lipitor -)  10 mg PO [facility]


   Last Admin: 04/15/19 21:52 Dose:  10 mg


Carbamazepine (Tegretol -)  200 mg PO [facility]


   Last Admin: 04/15/19 22:19 Dose:  200 mg


Digoxin (Lanoxin -)  0.125 mg PO DAILY [facility]


   Last Admin: 04/16/19 09:35 Dose:  0.125 mg


Docusate Sodium (Colace -)  100 mg PO TID [facility]


   Last Admin: 04/16/19 05:37 Dose:  100 mg


Donepezil HCl (Aricept -)  10 mg PO [facility]


   Last Admin: 04/15/19 21:53 Dose:  10 mg


Dextrose/Sodium Chloride (D5-1/2ns -)  1,000 mls @ 75 mls/hr IV ASDIR [facility]


   Last Admin: 04/15/19 12:35 Dose:  Not Given


Levothyroxine Sodium (Synthroid -)  50 mcg PO DAILY@0700 [facility]


   Last Admin: 04/16/19 06:06 Dose:  50 mcg


Omega-3-Acid Ethyl Esters (Lovaza -)  1 gm PO BID [facility]


   Last Admin: 04/16/19 09:35 Dose:  1 gm


Pantoprazole Sodium (Protonix -)  40 mg PO DAILY [facility]


   Last Admin: 04/16/19 09:35 Dose:  40 mg


Polyethylene Glycol (Miralax (For Daily Use) -)  17 gm PO DAILY [facility]


   Last Admin: 04/16/19 09:35 Dose:  17 gm


Prednisone (Deltasone -)  40 mg PO DAILY [facility]


   Last Admin: 04/16/19 09:35 Dose:  40 mg


Senna (Senna -)  2 tab PO HS PRN


   PRN Reason: CONSTIPATION


Simethicone (Mylicon -)  80 mg PO Q4H PRN


   PRN Reason: CONSTIPATION


   Last Admin: 04/16/19 10:38 Dose:  80 mg


Torsemide (Demadex -)  20 mg PO DAILY [facility]


   Last Admin: 04/16/19 09:35 Dose:  20 mg











Gen:  NAD at rest


Heart:  RRR


Lung: decreased breath sounds at the bases


Abd: soft, nontender


Ext: no edema





 Laboratory Results - last 24 hr











  04/16/19 04/16/19 04/16/19





  06:00 06:00 06:00


 


WBC   8.9 


 


RBC   4.24 


 


Hgb   14.8 


 


Hct   42.6 


 


MCV   100.6 H 


 


MCH   35.0 H 


 


MCHC   34.8 


 


RDW   13.9 


 


Plt Count   134 


 


MPV   8.0 


 


Absolute Neuts (auto)   7.3 


 


Neutrophils %   82.4 


 


Lymphocytes %   11.2  D 


 


Monocytes %   5.1 


 


Eosinophils %   1.1  D 


 


Basophils %   0.2 


 


Nucleated RBC %   0 


 


PTT (Actin FS)  27.4  


 


Sodium    137


 


Potassium    4.2


 


Chloride    101


 


Carbon Dioxide    33 H


 


Anion Gap    4 L


 


BUN    32 H


 


Creatinine    1.0


 


Creat Clearance w eGFR    71.36


 


Random Glucose    80


 


Calcium    8.3 L




















A/P


Altered Mental Status resolved


Acute COPD Exacerbation


Pneumonia


Chronic Hypoxic Respiratory Failure


Acute on Chronic Diastolic Heart Failure


Pulmonary HTN


Atrial Fibrillation


ANNA


CKD


Hyperlipidemia


Hypothyroidism


Dementia





-  completed antibiotics


-  prednisone taper


-  O2 to keep SpO2 >90%


-  NIPPV QHS and PRN during day


-  inhaled bronchodilators


-  DVT prophylaxis


-  D/C planning 





Dr Flor 











Problem List





- Problems


(1) Community acquired pneumonia


Code(s): J18.9 - PNEUMONIA, UNSPECIFIED ORGANISM   





(2) Acute respiratory acidosis


Code(s): E87.2 - ACIDOSIS   





(3) Acute respiratory failure with hypoxia and hypercarbia


Code(s): J96.01 - ACUTE RESPIRATORY FAILURE WITH HYPOXIA; J96.02 - ACUTE 

RESPIRATORY FAILURE WITH HYPERCAPNIA   





(4) Aneurysm of right popliteal artery


Code(s): I72.4 - ANEURYSM OF ARTERY OF LOWER EXTREMITY   





(5) COPD with acute exacerbation


Code(s): J44.1 - CHRONIC OBSTRUCTIVE PULMONARY DISEASE W (ACUTE) EXACERBATION   





(6) Chronic respiratory acidosis


Code(s): E87.2 - ACIDOSIS   





(7) Leg edema, right


Code(s): R60.0 - LOCALIZED EDEMA   





(8) Right ventricular systolic dysfunction


Code(s): I51.9 - HEART DISEASE, UNSPECIFIED   





(9) Atrial flutter


Code(s): I48.92 - UNSPECIFIED ATRIAL FLUTTER   


Qualifiers: 


   Atrial flutter type: unspecified   Qualified Code(s): I48.92 - Unspecified 

atrial flutter   





(10) COPD (chronic obstructive pulmonary disease)


Code(s): J44.9 - CHRONIC OBSTRUCTIVE PULMONARY DISEASE, UNSPECIFIED   


Qualifiers: 


   COPD type: COPD with acute exacerbation   Qualified Code(s): J44.1 - Chronic 

obstructive pulmonary disease with (acute) exacerbation   





(11) Chronic cor pulmonale


Code(s): I27.81 - COR PULMONALE (CHRONIC)   





(12) Dementia


Code(s): F03.90 - UNSPECIFIED DEMENTIA WITHOUT BEHAVIORAL DISTURBANCE   


Qualifiers: 


   Dementia type: unspecified type   Dementia behavioral disturbance: without 

behavioral disturbance   Qualified Code(s): F03.90 - Unspecified dementia 

without behavioral disturbance   





(13) Hyperlipidemia


Code(s): E78.5 - HYPERLIPIDEMIA, UNSPECIFIED   


Qualifiers: 


   Hyperlipidemia type: unspecified   Qualified Code(s): E78.5 - Hyperlipidemia

, unspecified   





(14) Hypertension


Code(s): I10 - ESSENTIAL (PRIMARY) HYPERTENSION   


Qualifiers: 


   Hypertension type: essential hypertension   Qualified Code(s): I10 - 

Essential (primary) hypertension   





(15) Hypothyroidism


Code(s): E03.9 - HYPOTHYROIDISM, UNSPECIFIED   


Qualifiers: 


   Hypothyroidism type: unspecified   Qualified Code(s): E03.9 - Hypothyroidism

, unspecified   





(16) Obstructive sleep apnea


Code(s): G47.33 - OBSTRUCTIVE SLEEP APNEA (ADULT) (PEDIATRIC)   





(17) Pulmonary hypertension


Code(s): I27.2 - OTHER SECONDARY PULMONARY HYPERTENSION * DO NOT USE *